# Patient Record
Sex: MALE | Race: WHITE | NOT HISPANIC OR LATINO | Employment: OTHER | URBAN - METROPOLITAN AREA
[De-identification: names, ages, dates, MRNs, and addresses within clinical notes are randomized per-mention and may not be internally consistent; named-entity substitution may affect disease eponyms.]

---

## 2019-07-22 ENCOUNTER — OFFICE VISIT (OUTPATIENT)
Dept: NEUROSURGERY | Facility: CLINIC | Age: 84
End: 2019-07-22

## 2019-07-22 ENCOUNTER — TELEPHONE (OUTPATIENT)
Dept: NEUROSURGERY | Facility: CLINIC | Age: 84
End: 2019-07-22

## 2019-07-22 VITALS — TEMPERATURE: 97.4 F | BODY MASS INDEX: 28.31 KG/M2 | HEIGHT: 72 IN | WEIGHT: 209 LBS

## 2019-07-22 DIAGNOSIS — M48.061 DEGENERATIVE LUMBAR SPINAL STENOSIS: ICD-10-CM

## 2019-07-22 DIAGNOSIS — Z98.890 HISTORY OF LUMBAR LAMINECTOMY: ICD-10-CM

## 2019-07-22 DIAGNOSIS — M79.604 RIGHT LEG PAIN: Primary | ICD-10-CM

## 2019-07-22 DIAGNOSIS — G60.9 HEREDITARY AND IDIOPATHIC PERIPHERAL NEUROPATHY: ICD-10-CM

## 2019-07-22 PROCEDURE — 99203 OFFICE O/P NEW LOW 30 MIN: CPT | Performed by: NEUROLOGICAL SURGERY

## 2019-07-22 RX ORDER — ATORVASTATIN CALCIUM 40 MG/1
TABLET, FILM COATED ORAL
COMMUNITY
Start: 2019-06-02 | End: 2022-03-25 | Stop reason: SDUPTHER

## 2019-07-22 RX ORDER — GLIMEPIRIDE 1 MG/1
TABLET ORAL
COMMUNITY
Start: 2019-07-15 | End: 2022-06-06

## 2019-07-22 RX ORDER — DICLOFENAC SODIUM AND MISOPROSTOL 50; 200 MG/1; UG/1
1 TABLET, DELAYED RELEASE ORAL 2 TIMES DAILY
COMMUNITY
End: 2019-07-22 | Stop reason: ALTCHOICE

## 2019-07-22 RX ORDER — AMLODIPINE BESYLATE 2.5 MG/1
2.5 TABLET ORAL DAILY
COMMUNITY
End: 2022-05-20

## 2019-07-22 RX ORDER — PYRAZINAMIDE 500 MG/1
1-2 TABLET ORAL EVERY 4 HOURS PRN
Qty: 60 TABLET | Refills: 0 | Status: SHIPPED | OUTPATIENT
Start: 2019-07-22 | End: 2022-05-20

## 2019-07-22 RX ORDER — OMEPRAZOLE 40 MG/1
40 CAPSULE, DELAYED RELEASE ORAL DAILY
COMMUNITY
End: 2022-06-27

## 2019-07-22 RX ORDER — CLOPIDOGREL BISULFATE 75 MG/1
TABLET ORAL
Refills: 0 | COMMUNITY
Start: 2019-05-13 | End: 2022-08-30 | Stop reason: SDUPTHER

## 2019-07-22 RX ORDER — ASPIRIN 81 MG/1
TABLET ORAL
COMMUNITY
Start: 2019-05-11 | End: 2022-05-20

## 2019-07-22 RX ORDER — METHOCARBAMOL 750 MG/1
750 TABLET, FILM COATED ORAL NIGHTLY
Qty: 30 TABLET | Refills: 0 | Status: SHIPPED | OUTPATIENT
Start: 2019-07-22 | End: 2022-05-20

## 2019-07-22 RX ORDER — TRAMADOL HYDROCHLORIDE 50 MG/1
50 TABLET ORAL EVERY 6 HOURS PRN
COMMUNITY
End: 2022-05-20

## 2019-07-22 RX ORDER — NABUMETONE 750 MG/1
750 TABLET, FILM COATED ORAL 2 TIMES DAILY
Qty: 60 TABLET | Refills: 0 | Status: SHIPPED | OUTPATIENT
Start: 2019-07-22 | End: 2019-08-20 | Stop reason: SDUPTHER

## 2019-07-22 RX ORDER — LOSARTAN POTASSIUM 50 MG/1
TABLET ORAL
COMMUNITY
Start: 2019-06-17 | End: 2022-06-06

## 2019-07-22 RX ORDER — LEVOTHYROXINE SODIUM 137 MCG
TABLET ORAL
COMMUNITY
Start: 2019-05-31 | End: 2022-09-29

## 2019-07-22 NOTE — PROGRESS NOTES
Onel ARIAS [facility]  3/25/1933  [id number]      Chief Complaint   Patient presents with   • Back Pain       HISTORY OF PRESENT ILLNESS: This is an 86-year-old male seen with a chief complaint of pain in his back rating to both lower extremities far greater on the right than the left.  This occurred after sustaining a fall approximately 1 month ago.  His past medical history is relevant in that he has a diagnosis of degenerative osteoarthritis and spinal stenosis for which a lumbar laminectomy was performed actually 15 years ago.  He is done well in the context of that until he had this fall.  He is scheduled to start physical therapy today.  Pain is quite severe clues him from walking without support.  It is in the anterior and lateral aspect of his thigh and groin.    Past Medical History:   Diagnosis Date   • Arthritis    • Diabetes mellitus (CMS/HCC)    • Hypertension    • TIA (transient ischemic attack)        Past Surgical History:   Procedure Laterality Date   • BACK SURGERY     • REPLACEMENT TOTAL KNEE Left    • TOTAL HIP ARTHROPLASTY Bilateral        Family History   Problem Relation Age of Onset   • Stroke Father        Social History     Socioeconomic History   • Marital status:      Spouse name: Not on file   • Number of children: Not on file   • Years of education: Not on file   • Highest education level: Not on file   Tobacco Use   • Smoking status: Former Smoker   • Smokeless tobacco: Never Used   Substance and Sexual Activity   • Alcohol use: Yes   • Drug use: No       No Known Allergies      Current Outpatient Medications:   •  amLODIPine (NORVASC) 2.5 MG tablet, Take 2.5 mg by mouth Daily., Disp: , Rfl:   •  aspirin 81 MG EC tablet, , Disp: , Rfl:   •  atorvastatin (LIPITOR) 40 MG tablet, , Disp: , Rfl:   •  clopidogrel (PLAVIX) 75 MG tablet, , Disp: , Rfl: 0  •  glimepiride (AMARYL) 1 MG tablet, , Disp: , Rfl:   •  losartan (COZAAR) 50 MG tablet, , Disp: , Rfl:   •  metFORMIN (GLUCOPHAGE) 1000  MG tablet, , Disp: , Rfl:   •  omeprazole (priLOSEC) 40 MG capsule, Take 40 mg by mouth Daily., Disp: , Rfl:   •  SYNTHROID 137 MCG tablet, , Disp: , Rfl:     Review of Systems   Constitutional: Positive for activity change and fatigue. Negative for appetite change, chills, diaphoresis, fever and unexpected weight change.   HENT: Positive for hearing loss and sneezing. Negative for congestion, dental problem, drooling, ear discharge, ear pain, facial swelling, mouth sores, nosebleeds, postnasal drip, rhinorrhea, sinus pressure, sinus pain, sore throat, tinnitus, trouble swallowing and voice change.    Eyes: Negative for photophobia, pain, discharge, redness, itching and visual disturbance.   Respiratory: Positive for cough. Negative for apnea, choking, chest tightness, shortness of breath, wheezing and stridor.    Cardiovascular: Negative for chest pain, palpitations and leg swelling.   Gastrointestinal: Negative for abdominal distention, abdominal pain, anal bleeding, blood in stool, constipation, diarrhea, nausea, rectal pain and vomiting.   Endocrine: Negative for cold intolerance, heat intolerance, polydipsia, polyphagia and polyuria.   Genitourinary: Negative for decreased urine volume, difficulty urinating, discharge, dysuria, enuresis, flank pain, frequency, genital sores, hematuria, penile pain, penile swelling, scrotal swelling, testicular pain and urgency.   Musculoskeletal: Positive for arthralgias and back pain. Negative for gait problem, joint swelling, myalgias, neck pain and neck stiffness.   Skin: Negative for color change, pallor, rash and wound.   Allergic/Immunologic: Positive for environmental allergies. Negative for food allergies and immunocompromised state.   Neurological: Positive for numbness. Negative for dizziness, tremors, seizures, syncope, facial asymmetry, speech difficulty, weakness, light-headedness and headaches.   Hematological: Negative for adenopathy. Does not bruise/bleed  "easily.   Psychiatric/Behavioral: Negative for agitation, behavioral problems, confusion, decreased concentration, dysphoric mood, hallucinations, self-injury, sleep disturbance and suicidal ideas. The patient is not nervous/anxious and is not hyperactive.        Vitals:    07/22/19 0901   Weight: 94.8 kg (209 lb)   Height: 182.9 cm (72\")       Neurological Examination:    Mental status/speech: The patient is alert and oriented.  Speech is clear without aphysia or dysarthria.  No overt cognitive deficits.    Cranial nerve examination:    Olfaction: Smell is intact.  Vision: Vision is intact without visual field abnormalities.  Funduscopic examination is normal.  No pupillary irregularity.  Ocular motor examination: The extraocular muscles are intact.  There is no diplopia.  The pupil is round and reactive to both light and accommodation.  There is no nystagmus.  Facial movement/sensation: There is no facial weakness.  Sensation is intact in the first, second, and third divisions of the trigeminal nerve.  The corneal reflex is intact.  Auditory: Hearing is intact to finger rub bilaterally.  Cranial nerves IX, X, XI, XII: Phonation is normal.  No dysphagia.  Tongue is protruded in the midline without atrophy.  The gag reflex is intact.  Shoulder shrug is normal.    Musculoligamentous ligamentous examination: Straight leg raising, Lasègue and flip test are negative.  There is no evidence of weakness.  He is areflexic in his upper and lower extremities.  There is no Babinski or Saleem.  He uses a walker for ambulation.    Medical Decision Making:     Diagnostic Data Set: The lumbar MRI data set show the presence of laminectomies at L2, L3 and L4.  He has significant lateral recess closure each of these levels including L1-L2 and L5-S1.      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  He will engage physical " therapy as prescribed.  He has an appointment to see Dr. Juan for an epidural steroid injection.  I have given him a prescription of Relafen 750 mg twice daily, Robaxin-750 milligrams at night and Tylenol 3 for pain.  He will call me in 2 weeks.  He has a significant anatomical abnormality that I am hopeful we can manage nonoperative.  Surgical intervention would entail facetectomies and possible PLIF at multiple levels.  At his age it may well not be in his best interest.  She will keep you informed.        I greatly appreciate the opportunity to see and evaluate this individual.  If you have questions or concerns regarding issues that I may have overlooked please call me at any time: [phone].  Stevenson Andrews M.D.  [facility]  [address] [place] [address]    Scribed for Alfonso Andrews MD by Suzy Winter CMA. 7/22/2019  9:19 AM    I have read and concur with the information provided by the scribe.  Alfonso Andrews MD

## 2019-07-22 NOTE — TELEPHONE ENCOUNTER
Provider:  Darryl  Caller: pharmacy  Time of call: 1241    Phone #: [phone]   Last visit:  today   Next visit:  tbd    Reason for call:         Pharmacy called to say that per their policy they cannot fill the Tylenol #3 as prescribed, they are requesting approval to add a maximum daily dose addendum to the Rx. The reason is that [facility] has a strict policy regarding morphine equivalency amounts for short-term narcotic pain medications. Ok to alter or have pt change pharmacies?

## 2019-07-22 NOTE — PATIENT INSTRUCTIONS
In 2 weeks after attending physical therapy and trying medications:     Call Dr. Andrwes on a Monday or Tuesday with an update.      Ask for Sharon () and leave a message for  Dr. Andrews.   He will call you back at the end of the day as soon as he can.     [phone]

## 2019-07-22 NOTE — TELEPHONE ENCOUNTER
If the patient is agreeable to what [facility] policy is then let [facility] make their changes.  He can try that first and see if that helps if not then we can go to a different pharmacy.

## 2019-08-20 ENCOUNTER — TELEPHONE (OUTPATIENT)
Dept: NEUROSURGERY | Facility: CLINIC | Age: 84
End: 2019-08-20

## 2019-08-20 RX ORDER — NABUMETONE 750 MG/1
750 TABLET, FILM COATED ORAL 2 TIMES DAILY
Qty: 60 TABLET | Refills: 1 | Status: SHIPPED | OUTPATIENT
Start: 2019-08-20 | End: 2019-10-28 | Stop reason: SDUPTHER

## 2019-08-20 NOTE — TELEPHONE ENCOUNTER
----- Message from Sharon Ricci sent at 8/20/2019  2:41 PM EDT -----  Contact: [phone]  PATIENT CALLING TO SAY HE IS DOING BETTER WITH PT AND RELAFEN.  HE NEEDS A REFILL OF HIS RELAFEN 750 B.I.D.

## 2019-10-28 DIAGNOSIS — M79.604 RIGHT LEG PAIN: Primary | ICD-10-CM

## 2019-10-28 DIAGNOSIS — M48.061 DEGENERATIVE LUMBAR SPINAL STENOSIS: ICD-10-CM

## 2019-10-28 RX ORDER — NABUMETONE 750 MG/1
750 TABLET, FILM COATED ORAL 2 TIMES DAILY
Qty: 60 TABLET | Refills: 1 | Status: SHIPPED | OUTPATIENT
Start: 2019-10-28 | End: 2019-12-20

## 2019-10-28 NOTE — TELEPHONE ENCOUNTER
Provider:  Darryl   Caller: pt   Time of call:   9:31  Phone #:  [phone]  Surgery:  na  Surgery Date:  na  Last visit:   7/22/2019  Next visit: carlos BARROSO:         Reason for call:           ----- Message from Sharon Ricci sent at 10/28/2019  9:31 AM EDT -----  Contact: [phone]  PATIENT REQUESTING A REFILL OF RELAFEN 750 B. I. D.      THANKS.

## 2019-10-29 ENCOUNTER — TELEPHONE (OUTPATIENT)
Dept: NEUROSURGERY | Facility: CLINIC | Age: 84
End: 2019-10-29

## 2019-10-29 NOTE — TELEPHONE ENCOUNTER
----- Message from Sharon Ricci sent at 10/29/2019  2:21 PM EDT -----  Contact: [phone]  PATIENT WOULD LIKE TO KNOW IF IT IS OK TO STOP PLAVIX FOR A COUPLE OF DAYS PRIOR TO HAVING SURGERY ON HIS EAR.

## 2019-10-29 NOTE — TELEPHONE ENCOUNTER
----- Message from Sharon Ricci sent at 10/29/2019  2:21 PM EDT -----  Contact: [phone]  PATIENT WOULD LIKE TO KNOW IF IT IS OK TO STOP PLAVIX FOR A COUPLE OF DAYS PRIOR TO HAVING SURGERY ON HIS EAR.

## 2019-12-20 DIAGNOSIS — M48.061 DEGENERATIVE LUMBAR SPINAL STENOSIS: ICD-10-CM

## 2019-12-20 DIAGNOSIS — M79.604 RIGHT LEG PAIN: ICD-10-CM

## 2019-12-20 RX ORDER — NABUMETONE 750 MG/1
TABLET, FILM COATED ORAL
Qty: 60 TABLET | Refills: 0 | Status: SHIPPED | OUTPATIENT
Start: 2019-12-20 | End: 2020-01-30

## 2020-01-30 DIAGNOSIS — M79.604 RIGHT LEG PAIN: ICD-10-CM

## 2020-01-30 DIAGNOSIS — M48.061 DEGENERATIVE LUMBAR SPINAL STENOSIS: ICD-10-CM

## 2020-01-30 RX ORDER — NABUMETONE 750 MG/1
TABLET, FILM COATED ORAL
Qty: 60 TABLET | Refills: 2 | Status: SHIPPED | OUTPATIENT
Start: 2020-01-30 | End: 2020-05-04 | Stop reason: SDUPTHER

## 2020-01-30 NOTE — TELEPHONE ENCOUNTER
Provider:  Darryl  Caller: Pharmacy  Time of call:   11:52a  Phone #:  [phone]  Surgery:  n/a  Surgery Date: n/a    Last visit:  19   Next visit: PRN       Reason for call:       Requested Prescriptions     Pending Prescriptions Disp Refills   • nabumetone (RELAFEN) 750 MG tablet [Pharmacy Med Name: NABUMETONE 750MG] 60 tablet 0     SiT PO BID

## 2020-05-04 DIAGNOSIS — M79.604 RIGHT LEG PAIN: ICD-10-CM

## 2020-05-04 DIAGNOSIS — M48.061 DEGENERATIVE LUMBAR SPINAL STENOSIS: ICD-10-CM

## 2020-05-04 RX ORDER — NABUMETONE 750 MG/1
750 TABLET, FILM COATED ORAL DAILY
Qty: 180 TABLET | Refills: 2 | Status: SHIPPED | OUTPATIENT
Start: 2020-05-04 | End: 2020-06-23 | Stop reason: SDUPTHER

## 2020-05-04 NOTE — TELEPHONE ENCOUNTER
Provider:  Darryl  Caller:  Automated refill request - Fax  Surgery:  NA  Surgery Date:    Last visit:  07/22/19  Next visit: NA    Reason for call:         Requested Prescriptions     Pending Prescriptions Disp Refills   • nabumetone (RELAFEN) 750 MG tablet 180 tablet 2     Sig: Take 1 tablet by mouth Daily.     *Changed QTY to 90 day

## 2022-03-26 RX ORDER — ATORVASTATIN CALCIUM 40 MG/1
40 TABLET, FILM COATED ORAL NIGHTLY
Qty: 90 TABLET | Refills: 1 | Status: SHIPPED | OUTPATIENT
Start: 2022-03-26 | End: 2022-05-20 | Stop reason: SDUPTHER

## 2022-04-13 ENCOUNTER — TELEPHONE (OUTPATIENT)
Dept: FAMILY MEDICINE CLINIC | Facility: CLINIC | Age: 87
End: 2022-04-13

## 2022-04-13 DIAGNOSIS — E11.9 TYPE 2 DIABETES MELLITUS WITHOUT COMPLICATION, WITHOUT LONG-TERM CURRENT USE OF INSULIN: Primary | ICD-10-CM

## 2022-04-13 PROBLEM — I10 PRIMARY HYPERTENSION: Status: ACTIVE | Noted: 2022-04-13

## 2022-04-16 DIAGNOSIS — I10 ESSENTIAL (PRIMARY) HYPERTENSION: ICD-10-CM

## 2022-04-18 RX ORDER — AMLODIPINE BESYLATE 5 MG/1
TABLET ORAL
Qty: 90 TABLET | Refills: 1 | Status: SHIPPED | OUTPATIENT
Start: 2022-04-18 | End: 2022-08-03 | Stop reason: SDUPTHER

## 2022-05-13 ENCOUNTER — LAB (OUTPATIENT)
Dept: FAMILY MEDICINE CLINIC | Facility: CLINIC | Age: 87
End: 2022-05-13

## 2022-05-13 DIAGNOSIS — E11.9 TYPE 2 DIABETES MELLITUS WITHOUT COMPLICATION, WITHOUT LONG-TERM CURRENT USE OF INSULIN: ICD-10-CM

## 2022-05-13 PROCEDURE — 36415 COLL VENOUS BLD VENIPUNCTURE: CPT | Performed by: FAMILY MEDICINE

## 2022-05-14 LAB
ALBUMIN SERPL-MCNC: 4.2 G/DL (ref 3.6–4.6)
ALBUMIN/GLOB SERPL: 1.7 {RATIO} (ref 1.2–2.2)
ALP SERPL-CCNC: 166 IU/L (ref 44–121)
ALT SERPL-CCNC: 15 IU/L (ref 0–44)
AST SERPL-CCNC: 25 IU/L (ref 0–40)
BASOPHILS # BLD AUTO: 0.1 X10E3/UL (ref 0–0.2)
BASOPHILS NFR BLD AUTO: 1 %
BILIRUB SERPL-MCNC: 0.7 MG/DL (ref 0–1.2)
BUN SERPL-MCNC: 16 MG/DL (ref 8–27)
BUN/CREAT SERPL: 12 (ref 10–24)
CALCIUM SERPL-MCNC: 8.8 MG/DL (ref 8.6–10.2)
CHLORIDE SERPL-SCNC: 94 MMOL/L (ref 96–106)
CHOLEST SERPL-MCNC: 142 MG/DL (ref 100–199)
CO2 SERPL-SCNC: 20 MMOL/L (ref 20–29)
CREAT SERPL-MCNC: 1.31 MG/DL (ref 0.76–1.27)
EGFRCR SERPLBLD CKD-EPI 2021: 52 ML/MIN/1.73
EOSINOPHIL # BLD AUTO: 0.1 X10E3/UL (ref 0–0.4)
EOSINOPHIL NFR BLD AUTO: 1 %
ERYTHROCYTE [DISTWIDTH] IN BLOOD BY AUTOMATED COUNT: 12.8 % (ref 11.6–15.4)
GLOBULIN SER CALC-MCNC: 2.5 G/DL (ref 1.5–4.5)
GLUCOSE SERPL-MCNC: 96 MG/DL (ref 65–99)
HBA1C MFR BLD: 6 % (ref 4.8–5.6)
HCT VFR BLD AUTO: 35.9 % (ref 37.5–51)
HDLC SERPL-MCNC: 47 MG/DL
HGB BLD-MCNC: 12.5 G/DL (ref 13–17.7)
IMM GRANULOCYTES # BLD AUTO: 0 X10E3/UL (ref 0–0.1)
IMM GRANULOCYTES NFR BLD AUTO: 0 %
LDLC SERPL CALC-MCNC: 76 MG/DL (ref 0–99)
LYMPHOCYTES # BLD AUTO: 1.9 X10E3/UL (ref 0.7–3.1)
LYMPHOCYTES NFR BLD AUTO: 22 %
MCH RBC QN AUTO: 31.6 PG (ref 26.6–33)
MCHC RBC AUTO-ENTMCNC: 34.8 G/DL (ref 31.5–35.7)
MCV RBC AUTO: 91 FL (ref 79–97)
MONOCYTES # BLD AUTO: 0.8 X10E3/UL (ref 0.1–0.9)
MONOCYTES NFR BLD AUTO: 10 %
NEUTROPHILS # BLD AUTO: 5.5 X10E3/UL (ref 1.4–7)
NEUTROPHILS NFR BLD AUTO: 66 %
PLATELET # BLD AUTO: 422 X10E3/UL (ref 150–450)
POTASSIUM SERPL-SCNC: 4.8 MMOL/L (ref 3.5–5.2)
PROT SERPL-MCNC: 6.7 G/DL (ref 6–8.5)
RBC # BLD AUTO: 3.95 X10E6/UL (ref 4.14–5.8)
SODIUM SERPL-SCNC: 134 MMOL/L (ref 134–144)
TRIGL SERPL-MCNC: 106 MG/DL (ref 0–149)
TSH SERPL DL<=0.005 MIU/L-ACNC: 1.28 UIU/ML (ref 0.45–4.5)
VLDLC SERPL CALC-MCNC: 19 MG/DL (ref 5–40)
WBC # BLD AUTO: 8.5 X10E3/UL (ref 3.4–10.8)

## 2022-05-20 ENCOUNTER — OFFICE VISIT (OUTPATIENT)
Dept: FAMILY MEDICINE CLINIC | Facility: CLINIC | Age: 87
End: 2022-05-20

## 2022-05-20 VITALS
BODY MASS INDEX: 25.06 KG/M2 | SYSTOLIC BLOOD PRESSURE: 152 MMHG | HEART RATE: 71 BPM | HEIGHT: 72 IN | DIASTOLIC BLOOD PRESSURE: 76 MMHG | OXYGEN SATURATION: 96 % | WEIGHT: 185 LBS

## 2022-05-20 DIAGNOSIS — E11.9 TYPE 2 DIABETES MELLITUS WITHOUT COMPLICATION, WITHOUT LONG-TERM CURRENT USE OF INSULIN: ICD-10-CM

## 2022-05-20 DIAGNOSIS — E78.2 MIXED HYPERLIPIDEMIA: ICD-10-CM

## 2022-05-20 DIAGNOSIS — I10 ESSENTIAL (PRIMARY) HYPERTENSION: ICD-10-CM

## 2022-05-20 DIAGNOSIS — G45.9 TIA (TRANSIENT ISCHEMIC ATTACK): Primary | ICD-10-CM

## 2022-05-20 PROCEDURE — 99214 OFFICE O/P EST MOD 30 MIN: CPT | Performed by: FAMILY MEDICINE

## 2022-05-20 RX ORDER — ATORVASTATIN CALCIUM 40 MG/1
80 TABLET, FILM COATED ORAL NIGHTLY
Qty: 90 TABLET | Refills: 1 | Status: SHIPPED | OUTPATIENT
Start: 2022-05-20 | End: 2022-05-21 | Stop reason: SDUPTHER

## 2022-05-20 RX ORDER — DONEPEZIL HYDROCHLORIDE 5 MG/1
5 TABLET, FILM COATED ORAL NIGHTLY
Qty: 30 TABLET | Refills: 2 | Status: SHIPPED | OUTPATIENT
Start: 2022-05-20 | End: 2022-08-18

## 2022-05-21 RX ORDER — ATORVASTATIN CALCIUM 80 MG/1
80 TABLET, FILM COATED ORAL NIGHTLY
Qty: 90 TABLET | Refills: 1 | Status: SHIPPED | OUTPATIENT
Start: 2022-05-21 | End: 2022-08-30 | Stop reason: SDUPTHER

## 2022-05-21 NOTE — PROGRESS NOTES
Follow Up Office Visit      Date of Visit:  2022   Patient Name: Onel Clark  : 3/25/1933   MRN: [id number]     Chief Complaint:    Chief Complaint   Patient presents with   • episode of poss TIA       History of Present Illness: Onel Clark is a 89 y.o. male who is here today for follow up.  With recent ER visit.  Episode of confusion and amnesia of the whole event of about 12 hours.  Everything was normal at the emergency room.  CT scan of his head showed chronic small vessel disease.  No acute stroke.  Questionable TIA versus dementia.        Subjective      Review of Systems:   Review of Systems   Constitutional: Positive for fatigue. Negative for unexpected weight loss.   Eyes: Negative for blurred vision.   Cardiovascular: Negative for chest pain.   Endocrine: Positive for polyuria. Negative for polydipsia and polyphagia.   Skin: Negative for pallor.   Neurological: Positive for confusion. Negative for dizziness, tremors, seizures, speech difficulty and weakness.   Psychiatric/Behavioral: The patient is nervous/anxious.        Past Medical History:   Past Medical History:   Diagnosis Date   • Acquired hypothyroidism    • Allergies    • Arthritis    • Benign prostatic hyperplasia with nocturia    • Bilateral carotid artery disease (HCC)    • Cancer of the skin, basal cell    • Chronic non-seasonal allergic rhinitis     DUE TO POLLEN   • Chronic renal impairment    • COPD (chronic obstructive pulmonary disease) (HCC)    • Decreased hearing of both ears    • Degenerative lumbar spinal stenosis    • Diabetes mellitus (HCC)    • Elevated PSA    • Frequent falls    • GERD without esophagitis    • High risk medication use    • Hx of bilateral hip replacements    • Hx of decompressive lumbar laminectomy    • Hx of total knee replacement, bilateral    • Hx of transient ischemic attack (TIA)    • Hypertension    • Mixed hyperlipidemia due to type 2 diabetes mellitus (HCC)    • Nicotine dependence     • No natural teeth     FALSE TEETH   • Osteoarthritis    • Primary hypertension    • Primary osteoarthritis involving multiple joints    • 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)    • Thyroid disease    • TIA (transient ischemic attack)    • Type 2 diabetes mellitus with diabetic polyneuropathy (HCC)    • Type 2 diabetes mellitus with stage 2 chronic kidney disease, without long-term current use of insulin (HCC)        Past Surgical History:   Past Surgical History:   Procedure Laterality Date   • BACK SURGERY  2007    Lumbar Laminectomy   • CARPAL TUNNEL RELEASE  2012   • REPLACEMENT TOTAL KNEE Left 2013   • TOTAL HIP ARTHROPLASTY Right 2016       Family History:   Family History   Problem Relation Age of Onset   • Alzheimer's disease Mother    • Stroke Father    • Coronary artery disease Father    • Diabetes Sister    • Hypertension Sister    • Hyperlipidemia Sister        Social History:   Social History     Socioeconomic History   • Marital status:    Tobacco Use   • Smoking status: Former Smoker   • Smokeless tobacco: Never Used   Substance and Sexual Activity   • Alcohol use: Yes   • Drug use: No       Medications:     Current Outpatient Medications:   •  amLODIPine (NORVASC) 5 MG tablet, TAKE 1 TABLET BY MOUTH EVERY DAY, Disp: 90 tablet, Rfl: 1  •  atorvastatin (LIPITOR) 40 MG tablet, Take 2 tablets by mouth Every Night., Disp: 90 tablet, Rfl: 1  •  clopidogrel (PLAVIX) 75 MG tablet, , Disp: , Rfl: 0  •  glimepiride (AMARYL) 1 MG tablet, , Disp: , Rfl:   •  losartan (COZAAR) 50 MG tablet, , Disp: , Rfl:   •  metFORMIN (GLUCOPHAGE) 1000 MG tablet, , Disp: , Rfl:   •  nabumetone (RELAFEN) 750 MG tablet, Take 1 tablet by mouth Daily., Disp: 180 tablet, Rfl: 2  •  omeprazole (priLOSEC) 40 MG capsule, Take 40 mg by mouth Daily., Disp: , Rfl:   •  SYNTHROID 137 MCG tablet, , Disp: , Rfl:   •  donepezil (Aricept) 5 MG tablet, Take 1 tablet by mouth Every Night., Disp: 30 tablet, Rfl:  "2    Allergies:   No Known Allergies    Objective     Physical Exam:  Vital Signs:   Vitals:    05/20/22 1031   BP: 152/76   Pulse: 71   SpO2: 96%   Weight: 83.9 kg (185 lb)   Height: 182.9 cm (72\")     Body mass index is 25.09 kg/m².     Physical Exam  Vitals and nursing note reviewed.   Constitutional:       General: He is not in acute distress.     Appearance: Normal appearance. He is not ill-appearing.   HENT:      Head: Normocephalic and atraumatic.      Right Ear: Tympanic membrane and ear canal normal.      Left Ear: Tympanic membrane and ear canal normal.      Nose: Nose normal.   Cardiovascular:      Rate and Rhythm: Normal rate and regular rhythm.      Heart sounds: Normal heart sounds.   Pulmonary:      Effort: Pulmonary effort is normal.      Breath sounds: Normal breath sounds.   Neurological:      Mental Status: He is alert and oriented to person, place, and time. Mental status is at baseline.   Psychiatric:         Mood and Affect: Mood normal.         Procedures    BMI is >= 25 and < 30. (Overweight) The following options were offered after discussion: none (medical contraindication)       Assessment / Plan      Assessment/Plan:   Diagnoses and all orders for this visit:    1. TIA (transient ischemic attack) (Primary)    2. Type 2 diabetes mellitus without complication, without long-term current use of insulin (HCC)    3. Essential (primary) hypertension    4. Mixed hyperlipidemia    Other orders  -     donepezil (Aricept) 5 MG tablet; Take 1 tablet by mouth Every Night.  Dispense: 30 tablet; Refill: 2  -     atorvastatin (LIPITOR) 40 MG tablet; Take 2 tablets by mouth Every Night.  Dispense: 90 tablet; Refill: 1         Maximize doses of current medication.  Increase Lipitor to 80 mg.  Take baby aspirin along with his current Plavix.  With his elderly age and frail nature, patient really declines any further major medical work-up.    Follow Up:   Return in about 3 months (around 8/20/2022) for " Sacha.    Ilir [facility]   Answers for HPI/ROS submitted by the patient on 5/14/2022  What is the primary reason for your visit?: Diabetes  Diabetes type: type 2  MedicAlert ID: No  Disease duration: 3 years  foot paresthesias: Yes  foot ulcerations: No  visual change: No  Symptom course: worsening  headaches: No  hunger: No  mood changes: Yes  sleepiness: Yes  sweats: No  blackouts: No  hospitalization: No  nocturnal hypoglycemia: No  required assistance: Yes  required glucagon: No  CVA: No  heart disease: No  impotence: No  nephropathy: No  peripheral neuropathy: Yes  PVD: Yes  retinopathy: No  CAD risks: dyslipidemia, hypertension  Current treatments: diet  Treatment compliance: most of the time  Monitoring compliance: fair  Weight trend: stable  Current diet: generally healthy  Meal planning: carbohydrate counting  Exercise: daily  Dietitian visit: No  Eye exam current: Yes  Sees podiatrist: Yes

## 2022-06-06 RX ORDER — LOSARTAN POTASSIUM 50 MG/1
TABLET ORAL
Qty: 90 TABLET | Refills: 1 | Status: SHIPPED | OUTPATIENT
Start: 2022-06-06 | End: 2022-06-22 | Stop reason: SDUPTHER

## 2022-06-06 RX ORDER — GLIMEPIRIDE 1 MG/1
TABLET ORAL
Qty: 90 TABLET | Refills: 1 | Status: SHIPPED | OUTPATIENT
Start: 2022-06-06 | End: 2022-06-22 | Stop reason: SDUPTHER

## 2022-06-08 DIAGNOSIS — M48.061 DEGENERATIVE LUMBAR SPINAL STENOSIS: ICD-10-CM

## 2022-06-08 DIAGNOSIS — M79.604 RIGHT LEG PAIN: ICD-10-CM

## 2022-06-08 RX ORDER — NABUMETONE 750 MG/1
750 TABLET, FILM COATED ORAL DAILY
Qty: 180 TABLET | Refills: 1 | Status: SHIPPED | OUTPATIENT
Start: 2022-06-08 | End: 2022-06-22 | Stop reason: SDUPTHER

## 2022-06-22 ENCOUNTER — PATIENT MESSAGE (OUTPATIENT)
Dept: FAMILY MEDICINE CLINIC | Facility: CLINIC | Age: 87
End: 2022-06-22

## 2022-06-22 DIAGNOSIS — M79.604 RIGHT LEG PAIN: ICD-10-CM

## 2022-06-22 DIAGNOSIS — M48.061 DEGENERATIVE LUMBAR SPINAL STENOSIS: ICD-10-CM

## 2022-06-22 RX ORDER — GLIMEPIRIDE 1 MG/1
1 TABLET ORAL DAILY
Qty: 90 TABLET | Refills: 1 | Status: SHIPPED | OUTPATIENT
Start: 2022-06-22 | End: 2022-09-29

## 2022-06-22 RX ORDER — POTASSIUM CHLORIDE 20 MEQ/1
20 TABLET, EXTENDED RELEASE ORAL 2 TIMES DAILY
COMMUNITY
End: 2022-06-22 | Stop reason: SDUPTHER

## 2022-06-22 RX ORDER — LOSARTAN POTASSIUM 50 MG/1
50 TABLET ORAL DAILY
Qty: 90 TABLET | Refills: 1 | Status: SHIPPED | OUTPATIENT
Start: 2022-06-22 | End: 2023-02-14

## 2022-06-22 RX ORDER — FUROSEMIDE 40 MG/1
40 TABLET ORAL DAILY
Qty: 30 TABLET | Refills: 2 | Status: SHIPPED | OUTPATIENT
Start: 2022-06-22

## 2022-06-22 RX ORDER — FUROSEMIDE 40 MG/1
TABLET ORAL
COMMUNITY
Start: 2022-06-05 | End: 2022-06-22 | Stop reason: SDUPTHER

## 2022-06-22 RX ORDER — NABUMETONE 750 MG/1
750 TABLET, FILM COATED ORAL DAILY
Qty: 180 TABLET | Refills: 1 | Status: SHIPPED | OUTPATIENT
Start: 2022-06-22 | End: 2023-02-14

## 2022-06-22 RX ORDER — POTASSIUM CHLORIDE 20 MEQ/1
20 TABLET, EXTENDED RELEASE ORAL 2 TIMES DAILY
Qty: 60 TABLET | Refills: 2 | Status: SHIPPED | OUTPATIENT
Start: 2022-06-22 | End: 2022-06-24 | Stop reason: SDUPTHER

## 2022-06-22 NOTE — TELEPHONE ENCOUNTER
From: Onel Clark  To: Ilir Fair MD  Sent: 6/22/2022 2:17 PM EDT  Subject: Prescriptions needed and sent to [facility] & MAILED to us.    Please fill the Nabutone, Glimepiride and Losartan prescriptions for Onel and have [facility] mail them to us. They know they should, but sometimes forget.    Also, he needs the potassium pills that he takes with the water pill and we can pick those up at [facility] because we need them now. Thank you again for your help. NB/SFC

## 2022-06-24 RX ORDER — POTASSIUM CHLORIDE 20 MEQ/1
20 TABLET, EXTENDED RELEASE ORAL 2 TIMES DAILY
Qty: 60 TABLET | Refills: 2 | Status: SHIPPED | OUTPATIENT
Start: 2022-06-24 | End: 2022-07-21 | Stop reason: SDUPTHER

## 2022-06-27 RX ORDER — OMEPRAZOLE 40 MG/1
CAPSULE, DELAYED RELEASE ORAL
Qty: 90 CAPSULE | Refills: 1 | Status: SHIPPED | OUTPATIENT
Start: 2022-06-27 | End: 2022-10-10 | Stop reason: SDUPTHER

## 2022-06-28 ENCOUNTER — PATIENT MESSAGE (OUTPATIENT)
Dept: FAMILY MEDICINE CLINIC | Facility: CLINIC | Age: 87
End: 2022-06-28

## 2022-06-28 RX ORDER — MEMANTINE HYDROCHLORIDE 5 MG/1
5 TABLET ORAL 2 TIMES DAILY
Qty: 60 TABLET | Refills: 2 | Status: SHIPPED | OUTPATIENT
Start: 2022-06-28 | End: 2022-09-29

## 2022-06-29 NOTE — TELEPHONE ENCOUNTER
From: Onel Clark  To: Ilir Fair MD  Sent: 6/28/2022 12:09 PM EDT  Subject: Aricepts' Donepezil 5mg.    He has been having diarreaha with this pill and thought with the Atorvastatin 80mg. Tried to make sure which one, and it seems to be the Donepezil. Is there any other med he could try that would help him without the diarreaha? If so, please send it to [facility] and tell them to mail it to us. Thanks again and hope you had a great vacation. We certainly miss the travelling !! NB/SFC

## 2022-07-08 ENCOUNTER — TELEPHONE (OUTPATIENT)
Dept: FAMILY MEDICINE CLINIC | Facility: CLINIC | Age: 87
End: 2022-07-08

## 2022-07-21 RX ORDER — POTASSIUM CHLORIDE 20 MEQ/1
20 TABLET, EXTENDED RELEASE ORAL 2 TIMES DAILY
Qty: 180 TABLET | Refills: 0 | Status: SHIPPED | OUTPATIENT
Start: 2022-07-21

## 2022-08-03 DIAGNOSIS — I10 ESSENTIAL (PRIMARY) HYPERTENSION: ICD-10-CM

## 2022-08-03 RX ORDER — AMLODIPINE BESYLATE 5 MG/1
5 TABLET ORAL DAILY
Qty: 90 TABLET | Refills: 1 | Status: SHIPPED | OUTPATIENT
Start: 2022-08-03 | End: 2022-08-30 | Stop reason: SDUPTHER

## 2022-08-18 RX ORDER — DONEPEZIL HYDROCHLORIDE 5 MG/1
TABLET, FILM COATED ORAL
Qty: 90 TABLET | Refills: 0 | Status: SHIPPED | OUTPATIENT
Start: 2022-08-18 | End: 2022-09-29

## 2022-08-30 DIAGNOSIS — I10 ESSENTIAL (PRIMARY) HYPERTENSION: ICD-10-CM

## 2022-08-30 RX ORDER — AMLODIPINE BESYLATE 5 MG/1
5 TABLET ORAL DAILY
Qty: 90 TABLET | Refills: 1 | Status: SHIPPED | OUTPATIENT
Start: 2022-08-30 | End: 2023-02-14

## 2022-08-30 RX ORDER — CLOPIDOGREL BISULFATE 75 MG/1
75 TABLET ORAL DAILY
Qty: 90 TABLET | Refills: 1 | Status: SHIPPED | OUTPATIENT
Start: 2022-08-30

## 2022-08-30 RX ORDER — ATORVASTATIN CALCIUM 80 MG/1
80 TABLET, FILM COATED ORAL NIGHTLY
Qty: 90 TABLET | Refills: 1 | Status: SHIPPED | OUTPATIENT
Start: 2022-08-30 | End: 2023-03-13

## 2022-08-31 ENCOUNTER — PATIENT MESSAGE (OUTPATIENT)
Dept: FAMILY MEDICINE CLINIC | Facility: CLINIC | Age: 87
End: 2022-08-31

## 2022-09-28 RX ORDER — LEVOTHYROXINE SODIUM 137 UG/1
137 TABLET ORAL DAILY
Qty: 90 TABLET | Refills: 1 | Status: SHIPPED | OUTPATIENT
Start: 2022-09-28 | End: 2023-03-13

## 2022-09-28 RX ORDER — LEVOTHYROXINE SODIUM 137 UG/1
1 TABLET ORAL DAILY
COMMUNITY
Start: 2022-03-18 | End: 2022-09-28 | Stop reason: SDUPTHER

## 2022-09-29 ENCOUNTER — OFFICE VISIT (OUTPATIENT)
Dept: FAMILY MEDICINE CLINIC | Facility: CLINIC | Age: 87
End: 2022-09-29

## 2022-09-29 VITALS
WEIGHT: 180 LBS | DIASTOLIC BLOOD PRESSURE: 64 MMHG | HEIGHT: 72 IN | OXYGEN SATURATION: 100 % | SYSTOLIC BLOOD PRESSURE: 140 MMHG | BODY MASS INDEX: 24.38 KG/M2 | HEART RATE: 76 BPM

## 2022-09-29 DIAGNOSIS — R41.3 MEMORY LOSS: ICD-10-CM

## 2022-09-29 DIAGNOSIS — E78.2 MIXED HYPERLIPIDEMIA: ICD-10-CM

## 2022-09-29 DIAGNOSIS — R19.7 DIARRHEA, UNSPECIFIED TYPE: ICD-10-CM

## 2022-09-29 DIAGNOSIS — G45.9 TIA (TRANSIENT ISCHEMIC ATTACK): Primary | ICD-10-CM

## 2022-09-29 PROCEDURE — 99214 OFFICE O/P EST MOD 30 MIN: CPT | Performed by: FAMILY MEDICINE

## 2022-09-30 NOTE — PROGRESS NOTES
Follow Up Office Visit      Date of Visit:  2022   Patient Name: Onel Clark  : 3/25/1933   MRN: [id number]     Chief Complaint:  No chief complaint on file.      History of Present Illness: Onel Clark is a 89 y.o. male who is here today for follow up.  Patient here for discussion on multiple issues.  There was some questions on his lipid medication.  They did not remember why we had increased his atorvastatin from 40 to 80 mg.  We discussed the prior TIA while he was on 40 mg.  This is what prompted the change to 80 mg.  They do understand now.  Patient also more recently with some diarrhea.  We have more recently been trying to work with medication to help a little bit with memory loss.  He has been on donepezil initially and now memantine.  It is possible that his diarrhea could be due to these medications or changes in these medications.        Subjective      Review of Systems:   Review of Systems   Constitutional: Negative for fatigue and fever.   HENT: Negative for congestion and ear pain.    Respiratory: Negative for apnea, cough, chest tightness and shortness of breath.    Cardiovascular: Negative for chest pain.   Gastrointestinal: Negative for abdominal pain, constipation, diarrhea and nausea.   Musculoskeletal: Positive for arthralgias.   Neurological: Positive for tremors and weakness.   Psychiatric/Behavioral: Negative for depressed mood and stress.       Past Medical History:   Past Medical History:   Diagnosis Date   • Acquired hypothyroidism    • Allergies    • Arthritis    • Benign prostatic hyperplasia with nocturia    • Bilateral carotid artery disease (HCC)    • Cancer of the skin, basal cell    • Chronic non-seasonal allergic rhinitis     DUE TO POLLEN   • Chronic renal impairment    • COPD (chronic obstructive pulmonary disease) ([facility])    • Decreased hearing of both ears    • Degenerative lumbar spinal stenosis    • Diabetes mellitus ([facility])    • Elevated PSA    •  Frequent falls    • GERD without esophagitis    • High risk medication use    • Hx of bilateral hip replacements    • Hx of decompressive lumbar laminectomy    • Hx of total knee replacement, bilateral    • Hx of transient ischemic attack (TIA)    • Hypertension    • Mixed hyperlipidemia due to type 2 diabetes mellitus (HCC)    • Nicotine dependence    • No natural teeth     FALSE TEETH   • Osteoarthritis    • Primary hypertension    • Primary osteoarthritis involving multiple joints    • 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)    • Thyroid disease    • TIA (transient ischemic attack)    • Type 2 diabetes mellitus with diabetic polyneuropathy (HCC)    • Type 2 diabetes mellitus with stage 2 chronic kidney disease, without long-term current use of insulin (HCC)        Past Surgical History:   Past Surgical History:   Procedure Laterality Date   • BACK SURGERY  2007    Lumbar Laminectomy   • CARPAL TUNNEL RELEASE  2012   • REPLACEMENT TOTAL KNEE Left 2013   • TOTAL HIP ARTHROPLASTY Right 2016       Family History:   Family History   Problem Relation Age of Onset   • Alzheimer's disease Mother    • Stroke Father    • Coronary artery disease Father    • Diabetes Sister    • Hypertension Sister    • Hyperlipidemia Sister        Social History:   Social History     Socioeconomic History   • Marital status:    Tobacco Use   • Smoking status: Former Smoker   • Smokeless tobacco: Never Used   Substance and Sexual Activity   • Alcohol use: Yes   • Drug use: No       Medications:     Current Outpatient Medications:   •  amLODIPine (NORVASC) 5 MG tablet, Take 1 tablet by mouth Daily., Disp: 90 tablet, Rfl: 1  •  atorvastatin (LIPITOR) 80 MG tablet, Take 1 tablet by mouth Every Night., Disp: 90 tablet, Rfl: 1  •  clopidogrel (PLAVIX) 75 MG tablet, Take 1 tablet by mouth Daily., Disp: 90 tablet, Rfl: 1  •  furosemide (LASIX) 40 MG tablet, Take 1 tablet by mouth Daily., Disp: 30 tablet, Rfl: 2  •  levothyroxine  "(SYNTHROID, LEVOTHROID) 137 MCG tablet, Take 1 tablet by mouth Daily., Disp: 90 tablet, Rfl: 1  •  losartan (COZAAR) 50 MG tablet, Take 1 tablet by mouth Daily., Disp: 90 tablet, Rfl: 1  •  metFORMIN (GLUCOPHAGE) 1000 MG tablet, , Disp: , Rfl:   •  nabumetone (RELAFEN) 750 MG tablet, Take 1 tablet by mouth Daily., Disp: 180 tablet, Rfl: 1  •  omeprazole (priLOSEC) 40 MG capsule, TAKE 1 CAPSULE ONCE DAILY BEFORE A MEAL, Disp: 90 capsule, Rfl: 1  •  potassium chloride (K-DUR,KLOR-CON) 20 MEQ CR tablet, Take 1 tablet by mouth 2 (Two) Times a Day., Disp: 180 tablet, Rfl: 0    Allergies:   No Known Allergies    Objective     Physical Exam:  Vital Signs:   Vitals:    09/29/22 1548   BP: 140/64   Pulse: 76   SpO2: 100%   Weight: 81.6 kg (180 lb)   Height: 182.9 cm (72\")     Body mass index is 24.41 kg/m².     Physical Exam  Vitals and nursing note reviewed.   Constitutional:       General: He is not in acute distress.     Appearance: Normal appearance. He is not ill-appearing.   HENT:      Head: Normocephalic and atraumatic.      Right Ear: Tympanic membrane and ear canal normal.      Left Ear: Tympanic membrane and ear canal normal.      Nose: Nose normal.   Cardiovascular:      Rate and Rhythm: Normal rate and regular rhythm.      Heart sounds: Normal heart sounds.   Pulmonary:      Effort: Pulmonary effort is normal.      Breath sounds: Normal breath sounds.   Neurological:      Mental Status: He is alert and oriented to person, place, and time. Mental status is at baseline.   Psychiatric:         Mood and Affect: Mood normal.         Procedures      Assessment / Plan      Assessment/Plan:   Diagnoses and all orders for this visit:    1. TIA (transient ischemic attack) (Primary)    2. Mixed hyperlipidemia    3. Diarrhea, unspecified type    4. Memory loss         He is going to continue the higher dose of atorvastatin because of the prior TIA.  Continue his other medications for hypertension and stroke risk.  We are " going to stop the memantine for now.  He is already off of the donepezil.  We will see if this helps with his diarrhea first before doing anything else.  We will then discuss also his memory loss depending on what happens with the diarrhea.    Follow Up:   No follow-ups on file.    Ilir Fair  [facility] [place]

## 2022-10-10 ENCOUNTER — TELEPHONE (OUTPATIENT)
Dept: FAMILY MEDICINE CLINIC | Facility: CLINIC | Age: 87
End: 2022-10-10

## 2022-10-10 RX ORDER — OMEPRAZOLE 40 MG/1
40 CAPSULE, DELAYED RELEASE ORAL DAILY
Qty: 90 CAPSULE | Refills: 1 | Status: SHIPPED | OUTPATIENT
Start: 2022-10-10

## 2022-11-09 RX ORDER — MEMANTINE HYDROCHLORIDE 5 MG/1
TABLET ORAL
Qty: 60 TABLET | Refills: 0 | Status: SHIPPED | OUTPATIENT
Start: 2022-11-09 | End: 2022-12-09 | Stop reason: SDUPTHER

## 2022-12-09 NOTE — TELEPHONE ENCOUNTER
Incoming Refill Request      Medication requested (name and dose): memantine (NAMENDA) 5 MG tablet    Pharmacy where request should be sent: [facility], [place]    Additional details provided by patient: PATIENT HAS TWO DAYS LEFT    Best call back number: [phone]    Does the patient have less than a 3 day supply:  [x] Yes  [] No    Emiliano Cuevas Rep  12/09/22, 08:23 EST

## 2022-12-12 RX ORDER — MEMANTINE HYDROCHLORIDE 5 MG/1
TABLET ORAL
Qty: 60 TABLET | Refills: 0 | OUTPATIENT
Start: 2022-12-12

## 2022-12-12 RX ORDER — MEMANTINE HYDROCHLORIDE 5 MG/1
5 TABLET ORAL 2 TIMES DAILY
Qty: 60 TABLET | Refills: 2 | Status: SHIPPED | OUTPATIENT
Start: 2022-12-12 | End: 2023-02-22

## 2023-01-16 ENCOUNTER — TELEPHONE (OUTPATIENT)
Dept: FAMILY MEDICINE CLINIC | Facility: CLINIC | Age: 88
End: 2023-01-16
Payer: MEDICARE

## 2023-01-16 DIAGNOSIS — E78.2 MIXED HYPERLIPIDEMIA: ICD-10-CM

## 2023-01-16 DIAGNOSIS — E11.9 TYPE 2 DIABETES MELLITUS WITHOUT COMPLICATION, WITHOUT LONG-TERM CURRENT USE OF INSULIN: ICD-10-CM

## 2023-01-16 DIAGNOSIS — I10 ESSENTIAL (PRIMARY) HYPERTENSION: Primary | ICD-10-CM

## 2023-01-16 RX ORDER — MEMANTINE HYDROCHLORIDE 5 MG/1
5 TABLET ORAL 2 TIMES DAILY
Qty: 60 TABLET | Refills: 2 | OUTPATIENT
Start: 2023-01-16

## 2023-01-16 NOTE — TELEPHONE ENCOUNTER
Caller: J LUIS DOMINGO    Relationship: Emergency Contact    Best call back number: [phone]    What orders are you requesting (i.e. lab or imaging): ACTIVE LAB ORDERS    In what timeframe would the patient need to come in: NA    Where will you receive your lab/imaging services: NA    Additional notes: PATIENTS WIFE CALLED TO SCHEDULE APPOINTMENT TO HAVE LABS DONE, BUT WOULD ALSO LIKE TO GO OVER THE LAB RESULTS AT APPOINTMENT.     PATIENT AS LAST SEEN 9/29/22      PLEASE ADVISE

## 2023-01-26 ENCOUNTER — LAB (OUTPATIENT)
Dept: FAMILY MEDICINE CLINIC | Facility: CLINIC | Age: 88
End: 2023-01-26
Payer: MEDICARE

## 2023-01-26 DIAGNOSIS — I10 ESSENTIAL (PRIMARY) HYPERTENSION: ICD-10-CM

## 2023-01-26 DIAGNOSIS — E11.9 TYPE 2 DIABETES MELLITUS WITHOUT COMPLICATION, WITHOUT LONG-TERM CURRENT USE OF INSULIN: ICD-10-CM

## 2023-01-26 DIAGNOSIS — E78.2 MIXED HYPERLIPIDEMIA: ICD-10-CM

## 2023-01-26 PROCEDURE — 36415 COLL VENOUS BLD VENIPUNCTURE: CPT | Performed by: FAMILY MEDICINE

## 2023-01-27 LAB
ALBUMIN SERPL-MCNC: 4 G/DL (ref 3.6–4.6)
ALBUMIN/GLOB SERPL: 1.5 {RATIO} (ref 1.2–2.2)
ALP SERPL-CCNC: 203 IU/L (ref 44–121)
ALT SERPL-CCNC: 13 IU/L (ref 0–44)
AST SERPL-CCNC: 25 IU/L (ref 0–40)
BASOPHILS # BLD AUTO: 0.1 X10E3/UL (ref 0–0.2)
BASOPHILS NFR BLD AUTO: 1 %
BILIRUB SERPL-MCNC: 0.5 MG/DL (ref 0–1.2)
BUN SERPL-MCNC: 17 MG/DL (ref 8–27)
BUN/CREAT SERPL: 13 (ref 10–24)
CALCIUM SERPL-MCNC: 9.6 MG/DL (ref 8.6–10.2)
CHLORIDE SERPL-SCNC: 97 MMOL/L (ref 96–106)
CHOLEST SERPL-MCNC: 150 MG/DL (ref 100–199)
CO2 SERPL-SCNC: 24 MMOL/L (ref 20–29)
CREAT SERPL-MCNC: 1.33 MG/DL (ref 0.76–1.27)
EGFRCR SERPLBLD CKD-EPI 2021: 51 ML/MIN/1.73
EOSINOPHIL # BLD AUTO: 0.2 X10E3/UL (ref 0–0.4)
EOSINOPHIL NFR BLD AUTO: 2 %
ERYTHROCYTE [DISTWIDTH] IN BLOOD BY AUTOMATED COUNT: 13.2 % (ref 11.6–15.4)
GLOBULIN SER CALC-MCNC: 2.7 G/DL (ref 1.5–4.5)
GLUCOSE SERPL-MCNC: 170 MG/DL (ref 70–99)
HBA1C MFR BLD: 6.8 % (ref 4.8–5.6)
HCT VFR BLD AUTO: 35.6 % (ref 37.5–51)
HDLC SERPL-MCNC: 65 MG/DL
HGB BLD-MCNC: 12.1 G/DL (ref 13–17.7)
IMM GRANULOCYTES # BLD AUTO: 0 X10E3/UL (ref 0–0.1)
IMM GRANULOCYTES NFR BLD AUTO: 0 %
LDLC SERPL CALC-MCNC: 68 MG/DL (ref 0–99)
LYMPHOCYTES # BLD AUTO: 2.3 X10E3/UL (ref 0.7–3.1)
LYMPHOCYTES NFR BLD AUTO: 23 %
MCH RBC QN AUTO: 31.7 PG (ref 26.6–33)
MCHC RBC AUTO-ENTMCNC: 34 G/DL (ref 31.5–35.7)
MCV RBC AUTO: 93 FL (ref 79–97)
MONOCYTES # BLD AUTO: 1 X10E3/UL (ref 0.1–0.9)
MONOCYTES NFR BLD AUTO: 10 %
NEUTROPHILS # BLD AUTO: 6.4 X10E3/UL (ref 1.4–7)
NEUTROPHILS NFR BLD AUTO: 64 %
PLATELET # BLD AUTO: 322 X10E3/UL (ref 150–450)
POTASSIUM SERPL-SCNC: 5 MMOL/L (ref 3.5–5.2)
PROT SERPL-MCNC: 6.7 G/DL (ref 6–8.5)
RBC # BLD AUTO: 3.82 X10E6/UL (ref 4.14–5.8)
SODIUM SERPL-SCNC: 137 MMOL/L (ref 134–144)
TRIGL SERPL-MCNC: 89 MG/DL (ref 0–149)
TSH SERPL DL<=0.005 MIU/L-ACNC: 2.09 UIU/ML (ref 0.45–4.5)
VLDLC SERPL CALC-MCNC: 17 MG/DL (ref 5–40)
WBC # BLD AUTO: 10 X10E3/UL (ref 3.4–10.8)

## 2023-02-14 DIAGNOSIS — M48.061 DEGENERATIVE LUMBAR SPINAL STENOSIS: ICD-10-CM

## 2023-02-14 DIAGNOSIS — M79.604 RIGHT LEG PAIN: ICD-10-CM

## 2023-02-14 DIAGNOSIS — I10 ESSENTIAL (PRIMARY) HYPERTENSION: ICD-10-CM

## 2023-02-14 RX ORDER — AMLODIPINE BESYLATE 5 MG/1
TABLET ORAL
Qty: 90 TABLET | Refills: 1 | Status: SHIPPED | OUTPATIENT
Start: 2023-02-14

## 2023-02-14 RX ORDER — NABUMETONE 750 MG/1
TABLET, FILM COATED ORAL
Qty: 180 TABLET | Refills: 0 | Status: SHIPPED | OUTPATIENT
Start: 2023-02-14

## 2023-02-14 RX ORDER — LOSARTAN POTASSIUM 50 MG/1
TABLET ORAL
Qty: 90 TABLET | Refills: 0 | Status: SHIPPED | OUTPATIENT
Start: 2023-02-14

## 2023-02-22 ENCOUNTER — OFFICE VISIT (OUTPATIENT)
Dept: FAMILY MEDICINE CLINIC | Facility: CLINIC | Age: 88
End: 2023-02-22
Payer: MEDICARE

## 2023-02-22 VITALS
HEIGHT: 72 IN | HEART RATE: 89 BPM | OXYGEN SATURATION: 96 % | BODY MASS INDEX: 25.19 KG/M2 | WEIGHT: 186 LBS | DIASTOLIC BLOOD PRESSURE: 70 MMHG | SYSTOLIC BLOOD PRESSURE: 130 MMHG

## 2023-02-22 DIAGNOSIS — I10 ESSENTIAL (PRIMARY) HYPERTENSION: Primary | ICD-10-CM

## 2023-02-22 DIAGNOSIS — Z00.00 ROUTINE GENERAL MEDICAL EXAMINATION AT A HEALTH CARE FACILITY: ICD-10-CM

## 2023-02-22 DIAGNOSIS — E11.9 TYPE 2 DIABETES MELLITUS WITHOUT COMPLICATION, WITHOUT LONG-TERM CURRENT USE OF INSULIN: ICD-10-CM

## 2023-02-22 DIAGNOSIS — M48.061 DEGENERATIVE LUMBAR SPINAL STENOSIS: ICD-10-CM

## 2023-02-22 DIAGNOSIS — E78.2 MIXED HYPERLIPIDEMIA: ICD-10-CM

## 2023-02-22 DIAGNOSIS — R41.3 MEMORY LOSS: ICD-10-CM

## 2023-02-22 PROCEDURE — 99213 OFFICE O/P EST LOW 20 MIN: CPT | Performed by: FAMILY MEDICINE

## 2023-02-22 NOTE — PROGRESS NOTES
Follow Up Office Visit      Date of Visit:  2023   Patient Name: Onel Clark  : 3/25/1933   MRN: [id number]     Chief Complaint:    Chief Complaint   Patient presents with   • go over labs       History of Present Illness: Onel Clark is a 89 y.o. male who is here today for follow up.  Patient following up on his recent blood work for his chronic medical conditions.  Blood work overall does remain stable.  He continues to have a great deal of difficulty with getting around because of his chronic osteoarthritis.  Walking with a walker.  He has hypertension hyperlipidemia and diabetes are currently controlled.  He does request a referral to dermatology for further checkups.  His dermatologist has retired.  Reviewed his current medications for memory and discussed with him and his wife.  Things are overall stable.        Subjective      Review of Systems:   Review of Systems   Constitutional: Positive for unexpected weight loss. Negative for chills and fever.   HENT: Positive for postnasal drip and rhinorrhea. Negative for ear pain and sore throat.    Respiratory: Positive for cough and wheezing. Negative for shortness of breath.    Cardiovascular: Negative for chest pain.   Musculoskeletal: Negative for myalgias.   Skin: Negative for rash.       Past Medical History:   Past Medical History:   Diagnosis Date   • Acquired hypothyroidism    • Allergies    • Arthritis    • Benign prostatic hyperplasia with nocturia    • Bilateral carotid artery disease (HCC)    • Cancer of the skin, basal cell    • Chronic non-seasonal allergic rhinitis     DUE TO POLLEN   • Chronic renal impairment    • COPD (chronic obstructive pulmonary disease) (HCC)    • Decreased hearing of both ears    • Degenerative lumbar spinal stenosis    • Diabetes mellitus (HCC)    • Elevated PSA    • Frequent falls    • GERD without esophagitis    • High risk medication use    • Hx of bilateral hip replacements    • Hx of  decompressive lumbar laminectomy    • Hx of total knee replacement, bilateral    • Hx of transient ischemic attack (TIA)    • Hypertension    • Mixed hyperlipidemia due to type 2 diabetes mellitus (HCC)    • Nicotine dependence    • No natural teeth     FALSE TEETH   • Osteoarthritis    • Primary hypertension    • Primary osteoarthritis involving multiple joints    • 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)    • Thyroid disease    • TIA (transient ischemic attack)    • Type 2 diabetes mellitus with diabetic polyneuropathy (HCC)    • Type 2 diabetes mellitus with stage 2 chronic kidney disease, without long-term current use of insulin (HCC)        Past Surgical History:   Past Surgical History:   Procedure Laterality Date   • BACK SURGERY  2007    Lumbar Laminectomy   • CARPAL TUNNEL RELEASE  2012   • REPLACEMENT TOTAL KNEE Left 2013   • TOTAL HIP ARTHROPLASTY Right 2016       Family History:   Family History   Problem Relation Age of Onset   • Alzheimer's disease Mother    • Stroke Father    • Coronary artery disease Father    • Diabetes Sister    • Hypertension Sister    • Hyperlipidemia Sister        Social History:   Social History     Socioeconomic History   • Marital status:    Tobacco Use   • Smoking status: Former   • Smokeless tobacco: Never   Substance and Sexual Activity   • Alcohol use: Yes   • Drug use: No       Medications:     Current Outpatient Medications:   •  amLODIPine (NORVASC) 5 MG tablet, TAKE 1 TABLET BY MOUTH EVERY DAY, Disp: 90 tablet, Rfl: 1  •  atorvastatin (LIPITOR) 80 MG tablet, Take 1 tablet by mouth Every Night., Disp: 90 tablet, Rfl: 1  •  clopidogrel (PLAVIX) 75 MG tablet, Take 1 tablet by mouth Daily., Disp: 90 tablet, Rfl: 1  •  furosemide (LASIX) 40 MG tablet, Take 1 tablet by mouth Daily., Disp: 30 tablet, Rfl: 2  •  levothyroxine (SYNTHROID, LEVOTHROID) 137 MCG tablet, Take 1 tablet by mouth Daily., Disp: 90 tablet, Rfl: 1  •  losartan (COZAAR) 50 MG tablet, TAKE  "1 TABLET BY MOUTH EVERY DAY, Disp: 90 tablet, Rfl: 0  •  memantine (NAMENDA) 5 MG tablet, Take 1 tablet by mouth 2 (Two) Times a Day., Disp: , Rfl:   •  metFORMIN (GLUCOPHAGE) 1000 MG tablet, , Disp: , Rfl:   •  nabumetone (RELAFEN) 750 MG tablet, TAKE 1 TABLET BY MOUTH EVERY DAY, Disp: 180 tablet, Rfl: 0  •  omeprazole (priLOSEC) 40 MG capsule, Take 1 capsule by mouth Daily., Disp: 90 capsule, Rfl: 1  •  potassium chloride (K-DUR,KLOR-CON) 20 MEQ CR tablet, Take 1 tablet by mouth 2 (Two) Times a Day., Disp: 180 tablet, Rfl: 0    Allergies:   No Known Allergies    Objective     Physical Exam:  Vital Signs:   Vitals:    02/22/23 1016   BP: 130/70   Pulse: 89   SpO2: 96%   Weight: 84.4 kg (186 lb)   Height: 182.9 cm (72\")     Body mass index is 25.23 kg/m².     Physical Exam  Vitals and nursing note reviewed.   Constitutional:       General: He is not in acute distress.     Appearance: Normal appearance. He is not ill-appearing.   HENT:      Head: Normocephalic and atraumatic.      Right Ear: Tympanic membrane and ear canal normal.      Left Ear: Tympanic membrane and ear canal normal.      Nose: Nose normal.   Cardiovascular:      Rate and Rhythm: Normal rate and regular rhythm.      Heart sounds: Normal heart sounds.   Pulmonary:      Effort: Pulmonary effort is normal.      Breath sounds: Normal breath sounds.   Neurological:      Mental Status: He is alert and oriented to person, place, and time. Mental status is at baseline.   Psychiatric:         Mood and Affect: Mood normal.         Procedures      Assessment / Plan      Assessment/Plan:   Diagnoses and all orders for this visit:    1. Essential (primary) hypertension (Primary)    2. Routine general medical examination at a health care facility  -     Ambulatory Referral to Dermatology    3. Degenerative lumbar spinal stenosis    4. Type 2 diabetes mellitus without complication, without long-term current use of insulin (HCC)    5. Mixed hyperlipidemia    6. " Memory loss         Current blood pressure readings are stable.  Patient does request referral to dermatology for an overall skin checkup.  He is current dermatologist has retired.  We reviewed his current blood work for his diabetes and hyperlipidemia.  Conditions are overall stable and he will need to continue his current medications.  Memory loss is overall stable.  Does currently take Namenda.  He will need refills at some point soon and we will do this when needed.  He continues to walk with a walker.  Very unsteady on his feet.    Follow Up:   No follow-ups on file.    Ilir Fair  [facility] [place]

## 2023-03-05 ENCOUNTER — PATIENT MESSAGE (OUTPATIENT)
Dept: FAMILY MEDICINE CLINIC | Facility: CLINIC | Age: 88
End: 2023-03-05
Payer: MEDICARE

## 2023-03-05 DIAGNOSIS — Z00.00 ROUTINE GENERAL MEDICAL EXAMINATION AT A HEALTH CARE FACILITY: Primary | ICD-10-CM

## 2023-03-13 RX ORDER — LEVOTHYROXINE SODIUM 137 UG/1
TABLET ORAL
Qty: 90 TABLET | Refills: 0 | Status: SHIPPED | OUTPATIENT
Start: 2023-03-13

## 2023-03-13 RX ORDER — ATORVASTATIN CALCIUM 80 MG/1
TABLET, FILM COATED ORAL
Qty: 90 TABLET | Refills: 0 | Status: SHIPPED | OUTPATIENT
Start: 2023-03-13

## 2023-03-13 NOTE — TELEPHONE ENCOUNTER
From: Onel Clark  To: Ilir Fair  Sent: 3/5/2023 2:08 PM EST  Subject: Referral to [facility] in [place].    Dr. Fair: We asked for you to send a referral to the above assoc. for Onel and I am including myself. This would be to one of these drsEliz that will be coming to [place] to see patients soon. We would have to go to [place] first, my understanding, and then be able to see that drEliz when they come to [place] . Just wanted to be sure you made a referral for us so they can call us to set up an appt. Thank You as always. [facility] [place]

## 2023-03-19 ENCOUNTER — PATIENT MESSAGE (OUTPATIENT)
Dept: FAMILY MEDICINE CLINIC | Facility: CLINIC | Age: 88
End: 2023-03-19
Payer: MEDICARE

## 2023-03-20 RX ORDER — MEMANTINE HYDROCHLORIDE 5 MG/1
5 TABLET ORAL 2 TIMES DAILY
Qty: 180 TABLET | Refills: 0 | Status: SHIPPED | OUTPATIENT
Start: 2023-03-20

## 2023-05-03 DIAGNOSIS — M48.061 DEGENERATIVE LUMBAR SPINAL STENOSIS: ICD-10-CM

## 2023-05-03 DIAGNOSIS — M79.604 RIGHT LEG PAIN: ICD-10-CM

## 2023-05-05 RX ORDER — LOSARTAN POTASSIUM 50 MG/1
TABLET ORAL
Qty: 90 TABLET | Refills: 0 | Status: SHIPPED | OUTPATIENT
Start: 2023-05-05

## 2023-05-05 RX ORDER — NABUMETONE 750 MG/1
TABLET, FILM COATED ORAL
Qty: 180 TABLET | Refills: 0 | Status: SHIPPED | OUTPATIENT
Start: 2023-05-05

## 2023-05-05 RX ORDER — CLOPIDOGREL BISULFATE 75 MG/1
TABLET ORAL
Qty: 90 TABLET | Refills: 0 | Status: SHIPPED | OUTPATIENT
Start: 2023-05-05

## 2023-05-08 ENCOUNTER — OFFICE VISIT (OUTPATIENT)
Dept: FAMILY MEDICINE CLINIC | Facility: CLINIC | Age: 88
End: 2023-05-08
Payer: MEDICARE

## 2023-05-08 VITALS
HEART RATE: 66 BPM | DIASTOLIC BLOOD PRESSURE: 80 MMHG | WEIGHT: 192 LBS | HEIGHT: 72 IN | BODY MASS INDEX: 26.01 KG/M2 | SYSTOLIC BLOOD PRESSURE: 112 MMHG | OXYGEN SATURATION: 100 %

## 2023-05-08 DIAGNOSIS — T14.8XXA NONHEALING NONSURGICAL WOUND LIMITED TO BREAKDOWN OF SKIN: Primary | ICD-10-CM

## 2023-05-08 NOTE — PROGRESS NOTES
"Chief Complaint  Wound Check    Subjective          Onel Clark presents to [facility]  History of Present Illness    Patient states that he has been struggling with a wound on the ankle.  Patient states that he was walking into Dr. Sparks's office several months ago and scraped the lateral aspect of the left ankle.  Patient states that it has been even better worse better again over the past several months.  Wife states that she has been changing the dressing and is concerned about the drainage coming from the wound.  He is following up with wound care at [facility] currently, but is going to be switching over to [facility] for second opinion.    Objective   Vital Signs:   /80 (BP Location: Left arm, Patient Position: Sitting, Cuff Size: Adult)   Pulse 66   Ht 182.9 cm (72\")   Wt 87.1 kg (192 lb)   SpO2 100%   BMI 26.04 kg/m²     Body mass index is 26.04 kg/m².    Review of Systems    Past History:  Medical History: has a past medical history of Acquired hypothyroidism, Allergies, Arthritis, Benign prostatic hyperplasia with nocturia, Bilateral carotid artery disease, Cancer of the skin, basal cell, Chronic non-seasonal allergic rhinitis, Chronic renal impairment, COPD (chronic obstructive pulmonary disease), Decreased hearing of both ears, Degenerative lumbar spinal stenosis, Diabetes mellitus, Elevated PSA, Frequent falls, GERD without esophagitis, High risk medication use, bilateral hip replacements, decompressive lumbar laminectomy, total knee replacement, bilateral, transient ischemic attack (TIA), Hypertension, Mixed hyperlipidemia due to type 2 diabetes mellitus, Nicotine dependence, No natural teeth, Osteoarthritis, Primary hypertension, Primary osteoarthritis involving multiple joints, 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   Surgical History: has a past surgical history that includes Total hip arthroplasty (Right, 2016); Replacement total knee (Left, 2013); Back surgery (2007); and Carpal tunnel release (2012).   Family History: family history includes Alzheimer's disease in his mother; Coronary artery disease in his father; Diabetes in his sister; Hyperlipidemia in his sister; Hypertension in his sister; Stroke in his father.   Social History: reports that he has quit smoking. He has never used smokeless tobacco. He reports current alcohol use. He reports that he does not use drugs.      Current Outpatient Medications:   •  amLODIPine (NORVASC) 5 MG tablet, TAKE 1 TABLET BY MOUTH EVERY DAY, Disp: 90 tablet, Rfl: 1  •  atorvastatin (LIPITOR) 80 MG tablet, TAKE 1 TABLET BY MOUTH EVERY NIGHT AT BEDTIME, Disp: 90 tablet, Rfl: 0  •  clopidogrel (PLAVIX) 75 MG tablet, TAKE 1 TABLET BY MOUTH EVERY DAY, Disp: 90 tablet, Rfl: 0  •  furosemide (LASIX) 40 MG tablet, Take 1 tablet by mouth Daily., Disp: 30 tablet, Rfl: 2  •  levothyroxine (SYNTHROID, LEVOTHROID) 137 MCG tablet, TAKE 1 TABLET BY MOUTH EVERY DAY, Disp: 90 tablet, Rfl: 0  •  losartan (COZAAR) 50 MG tablet, TAKE 1 TABLET BY MOUTH EVERY DAY, Disp: 90 tablet, Rfl: 0  •  memantine (NAMENDA) 5 MG tablet, Take 1 tablet by mouth 2 (Two) Times a Day., Disp: 180 tablet, Rfl: 0  •  metFORMIN (GLUCOPHAGE) 1000 MG tablet, TAKE 1 TABLET BY MOUTH 2 TIMES A DAY WITH BREAKFAST AND DINNER, Disp: 180 tablet, Rfl: 0  •  nabumetone (RELAFEN) 750 MG tablet, TAKE 1 TABLET BY MOUTH 2 TIMES A DAY, Disp: 180 tablet, Rfl: 0  •  omeprazole (priLOSEC) 40 MG capsule, Take 1 capsule by mouth Daily., Disp: 90 capsule, Rfl: 1  •  potassium chloride (K-DUR,KLOR-CON) 20 MEQ CR tablet, Take 1 tablet by mouth 2 (Two) Times a Day., Disp: 180 tablet, Rfl: 0    Allergies: Patient has no known allergies.    Physical  Exam  Constitutional:       General: He is not in acute distress.     Appearance: He is not ill-appearing or toxic-appearing.   HENT:      Head: Normocephalic and atraumatic.   Cardiovascular:      Rate and Rhythm: Normal rate and regular rhythm.      Heart sounds: No murmur heard.  Pulmonary:      Effort: Pulmonary effort is normal. No respiratory distress.   Skin:     Comments: Non healing wound on the lateral aspect of the ankle with yellow eschar.    Neurological:      General: No focal deficit present.      Mental Status: He is alert and oriented to person, place, and time.   Psychiatric:         Mood and Affect: Mood normal.         Thought Content: Thought content normal.          Result Review :                   Assessment and Plan    Diagnoses and all orders for this visit:    1. Nonhealing nonsurgical wound limited to breakdown of skin (Primary)  -     Ambulatory Referral to Home Health    Refer to home health for evaluation and maintenance of wound on the lateral aspect of ankle.  Recommend the patient follow-up with primary care physician in about 6 weeks for reevaluation.    Follow Up   No follow-ups on file.  Patient was given instructions and counseling regarding his condition or for health maintenance advice. Please see specific information pulled into the AVS if appropriate.     Chiqui Mcbride, DO

## 2023-05-09 ENCOUNTER — HOME HEALTH ADMISSION (OUTPATIENT)
Dept: HOME HEALTH SERVICES | Facility: HOME HEALTHCARE | Age: 88
End: 2023-05-09
Payer: MEDICARE

## 2023-05-10 ENCOUNTER — HOME CARE VISIT (OUTPATIENT)
Dept: HOME HEALTH SERVICES | Facility: HOME HEALTHCARE | Age: 88
End: 2023-05-10
Payer: MEDICARE

## 2023-05-10 PROCEDURE — G0299 HHS/HOSPICE OF RN EA 15 MIN: HCPCS

## 2023-05-10 NOTE — Clinical Note
SOC Note:    Home Health ordered for: SN, PT     Reason for Hosp/Primary Dx/Co-morbidities: physician referral from Dr. Syed due to wound to ankle     Focus of Care: wound assessment and care to left ankle WOUND     Skilled Need: WOUND CARE TO LEFT ANKLE AND ASSESSMENT WEEKLY, INSTRUCTION ON WOUND    Current Functional status/mobility/DME: uses rollator walker     HB status/Living Arrangements: lives with wife IN OWN HOME     Skin Integrity/wound status: wound to ankle LEFT, RAISED CON TUSION TO RIGHT FRONT OF SHIN    Code Status: full code    Fall Risk: high fall risk, reports multiple falls OVER THE LAST FEW MONTHS     POC confirmed with DR SYED 5/10/23    lasix and potassium weekly not daily dr syed knOws        knot to right lower leg from previous fall and bruise, wound center every monday in [place], 6 weeks dr syed    Plan for next visit:SN next week for wound care and assessment

## 2023-05-11 VITALS
SYSTOLIC BLOOD PRESSURE: 165 MMHG | DIASTOLIC BLOOD PRESSURE: 77 MMHG | RESPIRATION RATE: 18 BRPM | OXYGEN SATURATION: 98 % | TEMPERATURE: 97.8 F

## 2023-05-11 RX ORDER — FUROSEMIDE 40 MG/1
40 TABLET ORAL WEEKLY
Qty: 30 TABLET | Refills: 2 | Status: SHIPPED | OUTPATIENT
Start: 2023-05-11

## 2023-05-11 NOTE — HOME HEALTH
SOC Note:    Home Health ordered for: SN, PT     Reason for Hosp/Primary Dx/Co-morbidities: physician referral from Dr. Syed due to wound to ankle     Focus of Care: wound assessment and care to left ankle WOUND     Skilled Need: WOUND CARE TO LEFT ANKLE AND ASSESSMENT WEEKLY, INSTRUCTION ON WOUND    Current Functional status/mobility/DME: uses rollator walker     HB status/Living Arrangements: lives with wife IN OWN HOME     Skin Integrity/wound status: wound to ankle LEFT, RAISED CON TUSION TO RIGHT FRONT OF SHIN    Code Status: full code    Fall Risk: high fall risk, reports multiple falls OVER THE LAST FEW MONTHS     POC confirmed with DR SYED 5/10/23    lasix and potassium weekly not daily dr syed knOws        knot to right lower leg from previous fall and bruise, wound center every monday in [place], 6 weeks dr syed    Plan for next visit:SN next week for wound care and assessment

## 2023-05-15 ENCOUNTER — HOME CARE VISIT (OUTPATIENT)
Dept: HOME HEALTH SERVICES | Facility: HOME HEALTHCARE | Age: 88
End: 2023-05-15
Payer: MEDICARE

## 2023-05-15 VITALS
OXYGEN SATURATION: 99 % | HEART RATE: 70 BPM | DIASTOLIC BLOOD PRESSURE: 74 MMHG | SYSTOLIC BLOOD PRESSURE: 122 MMHG | RESPIRATION RATE: 16 BRPM

## 2023-05-15 PROCEDURE — G0151 HHCP-SERV OF PT,EA 15 MIN: HCPCS

## 2023-05-16 NOTE — HOME HEALTH
PT Eval Note:   Home Health ordered for: SN, PT - adding OT  Reason for Hosp/Primary Dx/Co-morbidities: physician referral from Dr. Fair due to wound to ankle   Focus of Care: HHPT 1wk3 for gait training, balance training, pt education, HEP instruction and therapeutic exercise related to LE wounds and increased fall risk  Current Functional status/mobility/DME: uses rollator walker in home with assistance; has a FWW, shower chair, toilet rails  HB status/Living Arrangements: lives with wife, son assists as needed; pt requires the use of an AD and the physical assistance of another person to safely leave home.   Skin Integrity/wound status: wound to ankle LLE wounds, wound care per SN  Code Status: full code   Fall Risk: high fall risk per [place], reports multiple falls

## 2023-05-17 ENCOUNTER — HOME CARE VISIT (OUTPATIENT)
Dept: HOME HEALTH SERVICES | Facility: HOME HEALTHCARE | Age: 88
End: 2023-05-17
Payer: MEDICARE

## 2023-05-17 VITALS
OXYGEN SATURATION: 98 % | HEART RATE: 78 BPM | SYSTOLIC BLOOD PRESSURE: 132 MMHG | DIASTOLIC BLOOD PRESSURE: 66 MMHG | TEMPERATURE: 97.3 F | RESPIRATION RATE: 16 BRPM

## 2023-05-17 PROCEDURE — G0495 RN CARE TRAIN/EDU IN HH: HCPCS

## 2023-05-17 NOTE — HOME HEALTH
Routine Visit Note:    Skill/education provided: Wound care assessment, measurement and picture  encouraged more water, protein in diet and elevation of lower leg    Patient/caregiver response:  Family and pt voiced undesrtanding.     Plan for next visit: wound assessment and meausrement.  Is pt eating protien, drinking water and elevating leg?    Other pertinent info: Wound clinic appt at [facility] on 5. 25. 23

## 2023-05-18 ENCOUNTER — HOME CARE VISIT (OUTPATIENT)
Dept: HOME HEALTH SERVICES | Facility: HOME HEALTHCARE | Age: 88
End: 2023-05-18
Payer: MEDICARE

## 2023-05-18 PROCEDURE — G0152 HHCP-SERV OF OT,EA 15 MIN: HCPCS

## 2023-05-18 NOTE — HOME HEALTH
OT Evaluation Note:  Home Health ordered for: SN, PT. OT added  Reason for Hosp/Primary Dx/Co-morbidities: physician referral from Dr. Fair due to wound to ankle.   Focus of Care: HHOT 1w2, 2m1 for pt/cg'er ed for EC/WS principles w/ ADL skills, recommendations/training with home safety/fall prevention  Current Functional status/mobility/DME: uses rollator walker   HB status/Living Arrangements: Pt lives with wife w/ all living completed on first floor. Son and DIL provide assist during the day and evenings.  Code Status: full code   Fall Risk: high fall risk, reports multiple falls   Plan for Next Visit: trial TTB, reinforce home safety/fall preventions recommendations

## 2023-05-21 VITALS
RESPIRATION RATE: 16 BRPM | HEART RATE: 65 BPM | DIASTOLIC BLOOD PRESSURE: 70 MMHG | OXYGEN SATURATION: 98 % | SYSTOLIC BLOOD PRESSURE: 130 MMHG

## 2023-05-22 ENCOUNTER — PATIENT MESSAGE (OUTPATIENT)
Dept: FAMILY MEDICINE CLINIC | Facility: CLINIC | Age: 88
End: 2023-05-22
Payer: MEDICARE

## 2023-05-22 ENCOUNTER — TELEPHONE (OUTPATIENT)
Dept: FAMILY MEDICINE CLINIC | Facility: CLINIC | Age: 88
End: 2023-05-22

## 2023-05-22 DIAGNOSIS — E11.9 TYPE 2 DIABETES MELLITUS WITHOUT COMPLICATION, WITHOUT LONG-TERM CURRENT USE OF INSULIN: Primary | ICD-10-CM

## 2023-05-22 DIAGNOSIS — E78.2 MIXED HYPERLIPIDEMIA: ICD-10-CM

## 2023-05-22 NOTE — TELEPHONE ENCOUNTER
Caller: J LUIS DOMINGO    Relationship: Emergency Contact    Best call back number: [phone]    What orders are you requesting (i.e. lab or imaging): LAB     In what timeframe would the patient need to come in: BEFORE THE APPOINTMENT ON 6.19.23 FOR IT TO BE DISCUSSED AT THE VISIT     Where will you receive your lab/imaging services:      Additional notes: PLEASE CALL BACK FOR SCHEDULING, IF NO ANSWER LEAVE A DETAILED MESSAGE.

## 2023-05-23 ENCOUNTER — HOME CARE VISIT (OUTPATIENT)
Dept: HOME HEALTH SERVICES | Facility: HOME HEALTHCARE | Age: 88
End: 2023-05-23
Payer: MEDICARE

## 2023-05-23 VITALS
SYSTOLIC BLOOD PRESSURE: 142 MMHG | OXYGEN SATURATION: 99 % | RESPIRATION RATE: 16 BRPM | DIASTOLIC BLOOD PRESSURE: 82 MMHG | TEMPERATURE: 96.3 F | HEART RATE: 66 BPM

## 2023-05-23 PROCEDURE — G0151 HHCP-SERV OF PT,EA 15 MIN: HCPCS

## 2023-05-24 NOTE — HOME HEALTH
Routine Visit Note:   Skill/education provided: issed and educated re: HEP; tranfser training sit to/from stand ; pt/spouse education re: fall prevention  Patient/caregiver response: pt pleasant and cooperative with partivipation, no c/o pain with ther ex  Plan for next visit: initiate standing exercises if tolerated  Other pertinent info: none

## 2023-05-25 ENCOUNTER — HOME CARE VISIT (OUTPATIENT)
Dept: HOME HEALTH SERVICES | Facility: HOME HEALTHCARE | Age: 88
End: 2023-05-25
Payer: MEDICARE

## 2023-05-25 VITALS
HEART RATE: 67 BPM | SYSTOLIC BLOOD PRESSURE: 124 MMHG | DIASTOLIC BLOOD PRESSURE: 80 MMHG | OXYGEN SATURATION: 96 % | RESPIRATION RATE: 16 BRPM

## 2023-05-25 PROCEDURE — G0152 HHCP-SERV OF OT,EA 15 MIN: HCPCS

## 2023-05-26 ENCOUNTER — HOME CARE VISIT (OUTPATIENT)
Dept: HOME HEALTH SERVICES | Facility: HOME HEALTHCARE | Age: 88
End: 2023-05-26
Payer: MEDICARE

## 2023-05-26 VITALS
HEART RATE: 78 BPM | RESPIRATION RATE: 16 BRPM | DIASTOLIC BLOOD PRESSURE: 66 MMHG | SYSTOLIC BLOOD PRESSURE: 102 MMHG | OXYGEN SATURATION: 97 % | TEMPERATURE: 97.3 F

## 2023-05-26 PROCEDURE — G0495 RN CARE TRAIN/EDU IN HH: HCPCS

## 2023-05-26 NOTE — HOME HEALTH
Routine Visit Note:    Skill/education provided: Wound care and assessment to L lower ankle wouind.  Pt saw WOund clinic at [facility] yesturday. They want them to change dressing every other day and prefer to use hydrafera blue CLASSIC rather that the Hydrafera blue READY.  The clinic gave them some of the CLassic and asked that they use it first. The REady was ordered by homecare as there was no specification of which clinic wanted.  When the Classic is gone the pt may use the  READY.   Wife was instructed and voiced understanding.       Plan for next visit Wound assesmsent and measureme

## 2023-05-29 NOTE — HOME HEALTH
Routine Visit Note:    Skill/education provided: Trial TTB training for walk in shower. Set up w/ son/pt and wife education/training for side stepping into shower and sitting on TTB. Reviewed/reinforced HEP for UB ther ex.     Patient/caregiver response: Pt agrees TTB is better than shower chair    Plan for next visit: Son will purchase shower bench and s/u in shower. Pt bathing from TTB in walk in shower    Other pertinent info:

## 2023-05-31 ENCOUNTER — HOME CARE VISIT (OUTPATIENT)
Dept: HOME HEALTH SERVICES | Facility: HOME HEALTHCARE | Age: 88
End: 2023-05-31
Payer: MEDICARE

## 2023-05-31 VITALS
OXYGEN SATURATION: 100 % | SYSTOLIC BLOOD PRESSURE: 124 MMHG | HEART RATE: 78 BPM | RESPIRATION RATE: 16 BRPM | TEMPERATURE: 97.4 F | DIASTOLIC BLOOD PRESSURE: 78 MMHG

## 2023-05-31 PROCEDURE — G0299 HHS/HOSPICE OF RN EA 15 MIN: HCPCS

## 2023-05-31 RX ORDER — LEVOTHYROXINE SODIUM 137 UG/1
TABLET ORAL
Qty: 90 TABLET | Refills: 0 | Status: SHIPPED | OUTPATIENT
Start: 2023-05-31

## 2023-05-31 RX ORDER — ATORVASTATIN CALCIUM 80 MG/1
TABLET, FILM COATED ORAL
Qty: 90 TABLET | Refills: 0 | Status: SHIPPED | OUTPATIENT
Start: 2023-05-31

## 2023-05-31 NOTE — HOME HEALTH
Routine Visit Note:    Skill/education provided: Wound care.   Packed with hydrafera blue Classic.   Wife indep with wound care on days homecare not present  Pt goes to WOund clinic next week.   Plan for next visit:  WOund care.  Assess for any changes to wound care by the Clinic

## 2023-06-01 ENCOUNTER — HOME CARE VISIT (OUTPATIENT)
Dept: HOME HEALTH SERVICES | Facility: HOME HEALTHCARE | Age: 88
End: 2023-06-01
Payer: MEDICARE

## 2023-06-01 ENCOUNTER — TELEPHONE (OUTPATIENT)
Dept: FAMILY MEDICINE CLINIC | Facility: CLINIC | Age: 88
End: 2023-06-01

## 2023-06-01 VITALS
RESPIRATION RATE: 16 BRPM | TEMPERATURE: 97.7 F | SYSTOLIC BLOOD PRESSURE: 126 MMHG | OXYGEN SATURATION: 99 % | HEART RATE: 68 BPM | DIASTOLIC BLOOD PRESSURE: 78 MMHG

## 2023-06-01 PROCEDURE — G0152 HHCP-SERV OF OT,EA 15 MIN: HCPCS

## 2023-06-01 PROCEDURE — G0151 HHCP-SERV OF PT,EA 15 MIN: HCPCS

## 2023-06-01 NOTE — TELEPHONE ENCOUNTER
Petra from Home Health is calling and is asking to speak to Angelika, about a verbal order. She said she needs a yes or no, in order to place an order for a home health aid for pt, to help him with personal care and bathing.     Petra Seo [phone]

## 2023-06-02 VITALS
HEART RATE: 66 BPM | DIASTOLIC BLOOD PRESSURE: 66 MMHG | SYSTOLIC BLOOD PRESSURE: 122 MMHG | RESPIRATION RATE: 18 BRPM | OXYGEN SATURATION: 99 %

## 2023-06-02 NOTE — HOME HEALTH
Routine Visit Note:    Skill/education provided: Son s/u TTB to correct ht. Wife ed for placement of TTB/lifting bench w/ suction cup on legs. Pt agreed to shower, cover R ankle wound w/ plastic wrap and secured w/ tape. Min assist to undress/dress, transfer in w<>out of walk in shower and washing difficult to reach areas. Pt needs vc/tactile cues to push up and reach back w/ sit to stand on all surfaces. Reviewed HEP- Pt completing ther ex marked off written HEP.      Patient/caregiver response: Pt tolerated session well, bathroom s/u for safe transfer w/ assist.     Plan for next visit: Transfer training, HEP reinforcement, walker safety    Other pertinent info: HHA orders requested w/ KEIKO Lambert completed HHA POC.

## 2023-06-07 ENCOUNTER — HOME CARE VISIT (OUTPATIENT)
Dept: HOME HEALTH SERVICES | Facility: HOME HEALTHCARE | Age: 88
End: 2023-06-07
Payer: MEDICARE

## 2023-06-07 VITALS
OXYGEN SATURATION: 99 % | DIASTOLIC BLOOD PRESSURE: 87 MMHG | TEMPERATURE: 97.9 F | HEART RATE: 81 BPM | SYSTOLIC BLOOD PRESSURE: 140 MMHG

## 2023-06-07 VITALS
RESPIRATION RATE: 16 BRPM | TEMPERATURE: 98.2 F | OXYGEN SATURATION: 99 % | DIASTOLIC BLOOD PRESSURE: 62 MMHG | HEART RATE: 55 BPM | SYSTOLIC BLOOD PRESSURE: 110 MMHG

## 2023-06-07 PROCEDURE — G0299 HHS/HOSPICE OF RN EA 15 MIN: HCPCS

## 2023-06-07 PROCEDURE — G0156 HHCP-SVS OF AIDE,EA 15 MIN: HCPCS

## 2023-06-07 NOTE — HOME HEALTH
Routine Visit Note:    Skill/education provided: Dressing changed to left ankle. Wife will continue to perform dressing changes when SN not present. Has appt w/ wound clinic 6/8/23. pt plans on drinking 1 ensure today.     Patient/caregiver response: pt well tolerated.     Plan for next visit: wound dressing change.

## 2023-06-09 ENCOUNTER — HOME CARE VISIT (OUTPATIENT)
Dept: HOME HEALTH SERVICES | Facility: HOME HEALTHCARE | Age: 88
End: 2023-06-09
Payer: MEDICARE

## 2023-06-09 PROCEDURE — G0152 HHCP-SERV OF OT,EA 15 MIN: HCPCS

## 2023-06-11 VITALS
OXYGEN SATURATION: 100 % | HEART RATE: 89 BPM | RESPIRATION RATE: 16 BRPM | DIASTOLIC BLOOD PRESSURE: 60 MMHG | SYSTOLIC BLOOD PRESSURE: 112 MMHG

## 2023-06-11 NOTE — HOME HEALTH
Routine Visit Note:    Skill/education provided: VC/tactile cue for UB ther ex, RW safety for sit <> stand and transfer for training walk in shower. Pt instr no showering as R ankle infection.     Patient/caregiver response: Pt tolerated session well, Reviewed s/u of thera band for elbow flex/ext-pt completed 3 trials w/ final attempt for accurate s/u.Wife observed.     Plan for next visit: d/c wk of 6/19.    Other pertinent info: Wife states that pt has an infection in L ankle wound and MD recommend no shower's until next MD visit. Pt/wife decline assist w/ sponge bathing and contacted HHA to clx next week visit. FEVER

## 2023-06-15 ENCOUNTER — HOME CARE VISIT (OUTPATIENT)
Dept: HOME HEALTH SERVICES | Facility: HOME HEALTHCARE | Age: 88
End: 2023-06-15
Payer: MEDICARE

## 2023-06-15 NOTE — CASE COMMUNICATION
Patient missed a AIDE visit from [facility] on 6-15-23.     Reason: Patient's spouse called and cancelled today's visit.       For your records only.   As per home health protocol, MD must be notified of missed/cancelled visits; therefore the prescribed frequency was not met.

## 2023-06-19 ENCOUNTER — OFFICE VISIT (OUTPATIENT)
Dept: FAMILY MEDICINE CLINIC | Facility: CLINIC | Age: 88
End: 2023-06-19
Payer: MEDICARE

## 2023-06-19 VITALS
SYSTOLIC BLOOD PRESSURE: 136 MMHG | DIASTOLIC BLOOD PRESSURE: 80 MMHG | HEART RATE: 94 BPM | OXYGEN SATURATION: 98 % | BODY MASS INDEX: 26.95 KG/M2 | HEIGHT: 72 IN | WEIGHT: 199 LBS

## 2023-06-19 DIAGNOSIS — K21.9 GASTROESOPHAGEAL REFLUX DISEASE WITHOUT ESOPHAGITIS: ICD-10-CM

## 2023-06-19 DIAGNOSIS — I10 ESSENTIAL (PRIMARY) HYPERTENSION: ICD-10-CM

## 2023-06-19 DIAGNOSIS — E11.9 TYPE 2 DIABETES MELLITUS WITHOUT COMPLICATION, WITHOUT LONG-TERM CURRENT USE OF INSULIN: ICD-10-CM

## 2023-06-19 DIAGNOSIS — E03.9 ACQUIRED HYPOTHYROIDISM: ICD-10-CM

## 2023-06-19 DIAGNOSIS — M48.061 DEGENERATIVE LUMBAR SPINAL STENOSIS: Primary | ICD-10-CM

## 2023-06-19 DIAGNOSIS — R41.3 MEMORY LOSS: ICD-10-CM

## 2023-06-19 DIAGNOSIS — G45.9 TIA (TRANSIENT ISCHEMIC ATTACK): ICD-10-CM

## 2023-06-19 PROCEDURE — 99214 OFFICE O/P EST MOD 30 MIN: CPT | Performed by: FAMILY MEDICINE

## 2023-06-19 RX ORDER — ATORVASTATIN CALCIUM 80 MG/1
80 TABLET, FILM COATED ORAL
Qty: 90 TABLET | Refills: 1 | Status: SHIPPED | OUTPATIENT
Start: 2023-06-19

## 2023-06-19 RX ORDER — LOSARTAN POTASSIUM 50 MG/1
50 TABLET ORAL DAILY
Qty: 90 TABLET | Refills: 1 | Status: SHIPPED | OUTPATIENT
Start: 2023-06-19

## 2023-06-19 RX ORDER — MEMANTINE HYDROCHLORIDE 5 MG/1
5 TABLET ORAL 2 TIMES DAILY
Qty: 180 TABLET | Refills: 1 | Status: SHIPPED | OUTPATIENT
Start: 2023-06-19

## 2023-06-19 RX ORDER — CLOPIDOGREL BISULFATE 75 MG/1
75 TABLET ORAL DAILY
Qty: 90 TABLET | Refills: 1 | Status: SHIPPED | OUTPATIENT
Start: 2023-06-19

## 2023-06-19 RX ORDER — AMLODIPINE BESYLATE 5 MG/1
5 TABLET ORAL DAILY
Qty: 90 TABLET | Refills: 1 | Status: SHIPPED | OUTPATIENT
Start: 2023-06-19

## 2023-06-19 RX ORDER — OMEPRAZOLE 40 MG/1
40 CAPSULE, DELAYED RELEASE ORAL DAILY
Qty: 90 CAPSULE | Refills: 1 | Status: SHIPPED | OUTPATIENT
Start: 2023-06-19

## 2023-06-19 RX ORDER — MELOXICAM 7.5 MG/1
7.5 TABLET ORAL DAILY
Qty: 30 TABLET | Refills: 5 | Status: SHIPPED | OUTPATIENT
Start: 2023-06-19

## 2023-06-19 RX ORDER — LEVOTHYROXINE SODIUM 137 UG/1
137 TABLET ORAL DAILY
Qty: 90 TABLET | Refills: 1 | Status: SHIPPED | OUTPATIENT
Start: 2023-06-19

## 2023-07-12 ENCOUNTER — TELEPHONE (OUTPATIENT)
Dept: FAMILY MEDICINE CLINIC | Facility: CLINIC | Age: 88
End: 2023-07-12

## 2023-07-12 NOTE — TELEPHONE ENCOUNTER
Caller: J LUIS DOMINGO    Relationship: Emergency Contact    Best call back number: [phone]    Equipment requested: WHEEL CHAIR    Reason for the request: PATIENT IS NEEDING TO HAVE ONE TO USE AT DIFFERENT TIMES. SHE FEELS LIKE MEDICARE WILL PAY FOR IT.    Prescribing Provider: DR. SYED    Additional information or concerns: ALSO SENT MESSAGE THRU MY CHART         SEND ORDER/PRESCRIPTION TO [facility]

## 2023-07-20 ENCOUNTER — TELEPHONE (OUTPATIENT)
Dept: FAMILY MEDICINE CLINIC | Facility: CLINIC | Age: 88
End: 2023-07-20
Payer: MEDICARE

## 2023-07-20 NOTE — TELEPHONE ENCOUNTER
Caller: J LUIS DOMINGO    Relationship: Emergency Contact    Best call back number:  [phone]    What is the best time to reach you: ANY    Who are you requesting to speak with (clinical staff, provider,  specific staff member):     CLINICAL    What was the call regarding:     UNSURE.  PATIENT'S WIFE SAID SOMEONE FROM OUR OFFICE CALLED AND SHE THINKS IT IS ABOUT  MARYLIN    Is it okay if the provider responds through MyChart: NO

## 2023-07-24 ENCOUNTER — TELEMEDICINE (OUTPATIENT)
Dept: FAMILY MEDICINE CLINIC | Facility: CLINIC | Age: 88
End: 2023-07-24
Payer: MEDICARE

## 2023-07-24 DIAGNOSIS — G60.9 HEREDITARY AND IDIOPATHIC PERIPHERAL NEUROPATHY: ICD-10-CM

## 2023-07-24 DIAGNOSIS — M48.061 DEGENERATIVE LUMBAR SPINAL STENOSIS: Primary | ICD-10-CM

## 2023-07-24 PROCEDURE — 99213 OFFICE O/P EST LOW 20 MIN: CPT | Performed by: FAMILY MEDICINE

## 2023-07-24 NOTE — PROGRESS NOTES
Mode of Visit: Video  Location of patient: home  You have chosen to receive care through a telehealth visit.  The patient has signed the video visit consent form.  The visit included audio and video interaction. No technical issues occurred during this visit.     Chief Complaint  WHEELCHAIR PAIGE Clark presents to [facility]    This pt needs a face to face for a manual wheelchair for medicare.  They have a dx of degenerative lumbar spinal stenosis as well as hereditary idiopathic peripheral neuropathy.  The patient has a mobility limitation that impairs their ability to participate in one or more mobility related activities of daily living such as toileting, feeding, dressing, grooming, and bathing in a customary location in the home.  The patient's mobility limitation cannot be sufficiently resolved by the use of an appropriately fitted cane or walker.  The patient's home provides adequate access between rooms, maneuvering space, and surfaces for the use of the manual wheelchair that is provided.  Use of the manual wheelchair will significantly improve the patient's ability to participate in the MRADL's and the patient will use it on a regular basis in the home.  The patient has not expressed an unwillingness to use the manual wheelchair that is provided in the home.  The patient has sufficient upper extremity function and other physical and mental capabilities needed to safely self-propel the manual wheelchair.       Review of Systems   Constitutional:  Negative for fatigue and fever.   HENT:  Negative for congestion and ear pain.    Respiratory:  Negative for apnea, cough, chest tightness and shortness of breath.    Cardiovascular:  Negative for chest pain.   Gastrointestinal:  Negative for abdominal pain, constipation, diarrhea and nausea.   Musculoskeletal:  Negative for arthralgias.   Psychiatric/Behavioral:  Negative for depressed mood and stress.       Objective   Vital Signs:   There were no vitals taken for this visit.    Physical Exam   Constitutional: He appears well-developed.     BMI is >= 25 and <30. (Overweight) The following options were offered after discussion;: exercise counseling/recommendations and nutrition counseling/recommendations            Assessment and Plan    Diagnoses and all orders for this visit:    1. Degenerative lumbar spinal stenosis (Primary)    2. Hereditary and idiopathic peripheral neuropathy        Orders written for appropriate manual wheelchair.  Give extrawide wheelchair because of patient's size.    Follow Up   No follow-ups on file.  Patient was given instructions and counseling regarding his condition or for health maintenance advice. Please see specific information pulled into the AVS if appropriate.

## 2023-07-28 ENCOUNTER — HOME CARE VISIT (OUTPATIENT)
Dept: HOME HEALTH SERVICES | Facility: HOME HEALTHCARE | Age: 88
End: 2023-07-28
Payer: MEDICARE

## 2023-07-28 PROCEDURE — G0299 HHS/HOSPICE OF RN EA 15 MIN: HCPCS

## 2023-07-30 VITALS
HEART RATE: 87 BPM | SYSTOLIC BLOOD PRESSURE: 128 MMHG | DIASTOLIC BLOOD PRESSURE: 78 MMHG | TEMPERATURE: 97.4 F | OXYGEN SATURATION: 100 % | RESPIRATION RATE: 16 BRPM

## 2023-07-30 NOTE — HOME HEALTH
Wound care performed. Pt tolerated well. CP assessment completed. Pt to see wound care clinic on 8.1.23. Pt did have angiogram done on 7.26.23 w/ one stent placed in left leg. Pt to see Dr. Rodriguez on 8.14. Next Visit: Wound care. F/U w/ wound care appt.

## 2023-08-02 ENCOUNTER — HOME CARE VISIT (OUTPATIENT)
Dept: HOME HEALTH SERVICES | Facility: HOME HEALTHCARE | Age: 88
End: 2023-08-02
Payer: MEDICARE

## 2023-08-02 VITALS
HEART RATE: 74 BPM | RESPIRATION RATE: 16 BRPM | DIASTOLIC BLOOD PRESSURE: 60 MMHG | TEMPERATURE: 97.7 F | OXYGEN SATURATION: 98 % | SYSTOLIC BLOOD PRESSURE: 118 MMHG

## 2023-08-02 PROCEDURE — G0299 HHS/HOSPICE OF RN EA 15 MIN: HCPCS

## 2023-08-09 ENCOUNTER — HOME CARE VISIT (OUTPATIENT)
Dept: HOME HEALTH SERVICES | Facility: HOME HEALTHCARE | Age: 88
End: 2023-08-09
Payer: MEDICARE

## 2023-08-09 NOTE — CASE COMMUNICATION
Received orders and notes from wound care today and are filed in Chart Review under Media tab.  Orders apppeared to remain unchanged and Patient to follow up with WCC in 2 weeks.

## 2023-08-10 ENCOUNTER — HOME CARE VISIT (OUTPATIENT)
Dept: HOME HEALTH SERVICES | Facility: HOME HEALTHCARE | Age: 88
End: 2023-08-10
Payer: MEDICARE

## 2023-08-10 VITALS
SYSTOLIC BLOOD PRESSURE: 124 MMHG | OXYGEN SATURATION: 98 % | HEART RATE: 68 BPM | TEMPERATURE: 97.7 F | DIASTOLIC BLOOD PRESSURE: 74 MMHG | RESPIRATION RATE: 18 BRPM

## 2023-08-10 PROCEDURE — G0299 HHS/HOSPICE OF RN EA 15 MIN: HCPCS

## 2023-08-10 NOTE — HOME HEALTH
Wound care done to L ankle. pt sees wound clinic again in 2 weeks. Has jonot w/ Dr. Rodriguez 8.14. Next visit: f/u w/ maddie. Wound care.

## 2023-08-13 DIAGNOSIS — R41.3 MEMORY LOSS: ICD-10-CM

## 2023-08-14 RX ORDER — MEMANTINE HYDROCHLORIDE 5 MG/1
TABLET ORAL
Qty: 180 TABLET | Refills: 1 | Status: SHIPPED | OUTPATIENT
Start: 2023-08-14

## 2023-08-15 ENCOUNTER — HOME CARE VISIT (OUTPATIENT)
Dept: HOME HEALTH SERVICES | Facility: HOME HEALTHCARE | Age: 88
End: 2023-08-15
Payer: MEDICARE

## 2023-08-15 NOTE — CASE COMMUNICATION
Patient missed a HHAIDE visit from [facility] on 8-15-23.     Reason: N/A.       For your records only.   As per home health protocol, MD must be notified of missed/cancelled visits; therefore the prescribed frequency was not met.

## 2023-08-16 ENCOUNTER — HOME CARE VISIT (OUTPATIENT)
Dept: HOME HEALTH SERVICES | Facility: HOME HEALTHCARE | Age: 88
End: 2023-08-16
Payer: MEDICARE

## 2023-08-16 VITALS
HEART RATE: 78 BPM | RESPIRATION RATE: 16 BRPM | DIASTOLIC BLOOD PRESSURE: 70 MMHG | SYSTOLIC BLOOD PRESSURE: 124 MMHG | OXYGEN SATURATION: 98 % | TEMPERATURE: 97.8 F

## 2023-08-16 PROCEDURE — G0299 HHS/HOSPICE OF RN EA 15 MIN: HCPCS

## 2023-08-16 NOTE — HOME HEALTH
Wound care done to Left ankle. Pt tolerated well. Educated importance of protein to promote wound healing. Pt saw Dr. Rodriguez on 8.14.23. Wife states they aren't to change dressing unless soiled. SN has left message w/ Dr. Rodrigeuz to clarify wound care. Lasix has been started daily as well as antibiotics as culture from wound came back w/ infection. Will order supplies. Next visit: wound care and medication education.

## 2023-08-18 ENCOUNTER — HOME CARE VISIT (OUTPATIENT)
Dept: HOME HEALTH SERVICES | Facility: HOME HEALTHCARE | Age: 88
End: 2023-08-18
Payer: MEDICARE

## 2023-08-18 NOTE — CASE COMMUNICATION
Patient missed a HHAIDE visit from [facility] on 8-18-23.     Reason: N/A.       For your records only.   As per home health protocol, MD must be notified of missed/cancelled visits; therefore the prescribed frequency was not met.

## 2023-08-22 ENCOUNTER — HOME CARE VISIT (OUTPATIENT)
Dept: HOME HEALTH SERVICES | Facility: HOME HEALTHCARE | Age: 88
End: 2023-08-22
Payer: MEDICARE

## 2023-08-22 PROCEDURE — G0156 HHCP-SVS OF AIDE,EA 15 MIN: HCPCS

## 2023-08-24 ENCOUNTER — HOME CARE VISIT (OUTPATIENT)
Dept: HOME HEALTH SERVICES | Facility: HOME HEALTHCARE | Age: 88
End: 2023-08-24
Payer: MEDICARE

## 2023-08-24 NOTE — CASE COMMUNICATION
Patient missed a AIDE  visit from [facility] on 8-24-23.     Reason: Patient called an canceled.       For your records only.   As per home health protocol, MD must be notified of missed/cancelled visits; therefore the prescribed frequency was not met.

## 2023-08-25 ENCOUNTER — HOME CARE VISIT (OUTPATIENT)
Dept: HOME HEALTH SERVICES | Facility: HOME HEALTHCARE | Age: 88
End: 2023-08-25
Payer: MEDICARE

## 2023-08-25 VITALS
HEART RATE: 72 BPM | DIASTOLIC BLOOD PRESSURE: 78 MMHG | TEMPERATURE: 97.7 F | SYSTOLIC BLOOD PRESSURE: 124 MMHG | RESPIRATION RATE: 16 BRPM | OXYGEN SATURATION: 99 %

## 2023-08-25 PROCEDURE — G0299 HHS/HOSPICE OF RN EA 15 MIN: HCPCS

## 2023-08-25 NOTE — HOME HEALTH
Routine Visit Note:    Skill/education provided: Wound care completed. Received phone call from wound clinic to do wound care twice a week on Mondays and Fridays. Pt will go to wound care clinic on Wednesdays.     Patient/caregiver response:  pt tolerated well.     Plan for next visit: Wound care to Left ankle

## 2023-08-28 ENCOUNTER — HOME CARE VISIT (OUTPATIENT)
Dept: HOME HEALTH SERVICES | Facility: HOME HEALTHCARE | Age: 88
End: 2023-08-28
Payer: MEDICARE

## 2023-08-28 VITALS
HEART RATE: 64 BPM | DIASTOLIC BLOOD PRESSURE: 62 MMHG | RESPIRATION RATE: 16 BRPM | SYSTOLIC BLOOD PRESSURE: 110 MMHG | OXYGEN SATURATION: 98 % | TEMPERATURE: 97.7 F

## 2023-08-28 PROCEDURE — G0299 HHS/HOSPICE OF RN EA 15 MIN: HCPCS

## 2023-08-29 NOTE — HOME HEALTH
Wound care done to left ankle. pt tolerated well. pt sees wound clinic on Wednesday. Educated on importance of protein to promote wound healing. Next visit: wound care. f/u w// wound clinic visit.

## 2023-08-31 ENCOUNTER — HOME CARE VISIT (OUTPATIENT)
Dept: HOME HEALTH SERVICES | Facility: HOME HEALTHCARE | Age: 88
End: 2023-08-31
Payer: MEDICARE

## 2023-08-31 VITALS
TEMPERATURE: 97.9 F | SYSTOLIC BLOOD PRESSURE: 136 MMHG | RESPIRATION RATE: 16 BRPM | HEART RATE: 74 BPM | OXYGEN SATURATION: 98 % | DIASTOLIC BLOOD PRESSURE: 86 MMHG

## 2023-08-31 PROCEDURE — G0156 HHCP-SVS OF AIDE,EA 15 MIN: HCPCS

## 2023-09-01 ENCOUNTER — HOME CARE VISIT (OUTPATIENT)
Dept: HOME HEALTH SERVICES | Facility: HOME HEALTHCARE | Age: 88
End: 2023-09-01
Payer: MEDICARE

## 2023-09-01 PROCEDURE — G0299 HHS/HOSPICE OF RN EA 15 MIN: HCPCS

## 2023-09-03 VITALS
RESPIRATION RATE: 16 BRPM | DIASTOLIC BLOOD PRESSURE: 68 MMHG | HEART RATE: 74 BPM | TEMPERATURE: 97.8 F | OXYGEN SATURATION: 98 % | SYSTOLIC BLOOD PRESSURE: 110 MMHG

## 2023-09-03 NOTE — HOME HEALTH
Pt educated on importance of protein to promote wound healing.New wound care completed. Will order supplies for new wound care. pt tolerated well. CP assessment negative. No new medications. Next visit: wound care/CP assessment.

## 2023-09-04 ENCOUNTER — HOME CARE VISIT (OUTPATIENT)
Dept: HOME HEALTH SERVICES | Facility: HOME HEALTHCARE | Age: 88
End: 2023-09-04
Payer: MEDICARE

## 2023-09-04 VITALS
SYSTOLIC BLOOD PRESSURE: 124 MMHG | HEART RATE: 70 BPM | DIASTOLIC BLOOD PRESSURE: 68 MMHG | RESPIRATION RATE: 18 BRPM | TEMPERATURE: 97 F | OXYGEN SATURATION: 96 %

## 2023-09-04 PROCEDURE — G0300 HHS/HOSPICE OF LPN EA 15 MIN: HCPCS

## 2023-09-06 ENCOUNTER — HOME CARE VISIT (OUTPATIENT)
Dept: HOME HEALTH SERVICES | Facility: HOME HEALTHCARE | Age: 88
End: 2023-09-06
Payer: MEDICARE

## 2023-09-06 PROCEDURE — G0495 RN CARE TRAIN/EDU IN HH: HCPCS

## 2023-09-07 ENCOUNTER — HOME CARE VISIT (OUTPATIENT)
Dept: HOME HEALTH SERVICES | Facility: HOME HEALTHCARE | Age: 88
End: 2023-09-07
Payer: MEDICARE

## 2023-09-07 VITALS
TEMPERATURE: 97.7 F | RESPIRATION RATE: 16 BRPM | DIASTOLIC BLOOD PRESSURE: 68 MMHG | OXYGEN SATURATION: 100 % | HEART RATE: 67 BPM | SYSTOLIC BLOOD PRESSURE: 110 MMHG

## 2023-09-07 VITALS
TEMPERATURE: 97.7 F | RESPIRATION RATE: 16 BRPM | OXYGEN SATURATION: 95 % | SYSTOLIC BLOOD PRESSURE: 132 MMHG | DIASTOLIC BLOOD PRESSURE: 76 MMHG | HEART RATE: 62 BPM

## 2023-09-07 PROCEDURE — G0156 HHCP-SVS OF AIDE,EA 15 MIN: HCPCS

## 2023-09-07 NOTE — CASE COMMUNICATION
60 Day Summary  Home Health need continues for: Skilled Nursing  Primary diagnoses/co-morbidities/recent procedures in past 60 days that impact current episode: Open wound to left ankle  Current level of functional ability: Ambulates w/ rollator w/ assist x 1 occasionally  Homebound status and living arrangements: pt lives at home w/ wife. Son and daughter in law frequently check in and take to medical appts.  Goals accomplished and/or  measurable progress toward unmet goals in past 60 days: SN to perform wound care on MF and pt sees [facility] on Wednesdays.  Focus of care for next 60 days for each discipline ordered: assessment of wound  Skin integrity/wound status: Pt saw Wound clinic today who did wound care. Will assess wound Friday.  Estimated date when home care services will end 9 weeks  SDOH changes/barriers (i.e. Caregiver availability, social isolat ion, environment, income, transportation access, food insecurity etc.)   Need for MSW referral? N  Plan for next visit wound care/assessment/importance of protein in diet education

## 2023-09-07 NOTE — HOME HEALTH
60 Day Summary  Home Health need continues for: Skilled Nursing  Primary diagnoses/co-morbidities/recent procedures in past 60 days that impact current episode: Open wound to left ankle  Current level of functional ability: Ambulates w/ rollator w/ assist x 1 occasionally  Homebound status and living arrangements: pt lives at home w/ wife. Son and daughter in law frequently check in and take to medical appts.  Goals accomplished and/or measurable progress toward unmet goals in past 60 days: SN to perform wound care on MF and pt sees [facility] on Wednesdays.  Focus of care for next 60 days for each discipline ordered: assessment of wound  Skin integrity/wound status: Pt saw Wound clinic today who did wound care. Will assess wound Friday.  Estimated date when home care services will end 9 weeks  SDOH changes/barriers (i.e. Caregiver availability, social isolation, environment, income, transportation access, food insecurity etc.)   Need for MSW referral? N  Plan for next visit wound care/assessment/importance of protein in diet education

## 2023-09-08 ENCOUNTER — HOME CARE VISIT (OUTPATIENT)
Dept: HOME HEALTH SERVICES | Facility: HOME HEALTHCARE | Age: 88
End: 2023-09-08
Payer: MEDICARE

## 2023-09-08 PROCEDURE — G0299 HHS/HOSPICE OF RN EA 15 MIN: HCPCS

## 2023-09-09 VITALS
TEMPERATURE: 97.7 F | SYSTOLIC BLOOD PRESSURE: 122 MMHG | HEART RATE: 66 BPM | OXYGEN SATURATION: 100 % | RESPIRATION RATE: 16 BRPM | DIASTOLIC BLOOD PRESSURE: 70 MMHG

## 2023-09-09 NOTE — HOME HEALTH
Wound care completed. Pt tolerated well. Will order supplies. Educated on importance of taking shoes off when resting to prevent skin breakdown due to them rubbing the side of the foot. Next Visit: wound care

## 2023-09-11 ENCOUNTER — HOME CARE VISIT (OUTPATIENT)
Dept: HOME HEALTH SERVICES | Facility: HOME HEALTHCARE | Age: 88
End: 2023-09-11
Payer: MEDICARE

## 2023-09-11 VITALS
DIASTOLIC BLOOD PRESSURE: 68 MMHG | OXYGEN SATURATION: 98 % | HEART RATE: 57 BPM | RESPIRATION RATE: 16 BRPM | SYSTOLIC BLOOD PRESSURE: 110 MMHG | TEMPERATURE: 97.8 F

## 2023-09-11 PROCEDURE — G0299 HHS/HOSPICE OF RN EA 15 MIN: HCPCS

## 2023-09-11 NOTE — CASE COMMUNICATION
Pt has been taking Lasix daily per Dr. Rodriguez for about 2 weeks to decrease edema in left extremity, but did not write for a new script. Wife spoke w/ Jennifer office today to clarify if they needed to keep doing this because edema is gone and concerned of pt getting dehydrated and [facility] because he doesn't drink enough water, but office stated Jennifer isn't the prescriber so would need to contact you. How do you want him to take his lasix ?

## 2023-09-12 NOTE — HOME HEALTH
Wound care completed. pt tolerated well. Pt states his leg feels numb. pt foot warm w/ pulses felt. Wrapped coban slightly looser. pt has wound care clinic on Wednesday and wife will let me know if wound care changes for supply ordering. Wife had questions on how to take the Lasix-inbasket sent to PCP. Next visit: Wound care/assessment/medication education

## 2023-09-13 ENCOUNTER — HOME CARE VISIT (OUTPATIENT)
Dept: HOME HEALTH SERVICES | Facility: HOME HEALTHCARE | Age: 88
End: 2023-09-13
Payer: MEDICARE

## 2023-09-13 VITALS
TEMPERATURE: 97.9 F | DIASTOLIC BLOOD PRESSURE: 76 MMHG | SYSTOLIC BLOOD PRESSURE: 131 MMHG | HEART RATE: 62 BPM | RESPIRATION RATE: 16 BRPM | OXYGEN SATURATION: 99 %

## 2023-09-13 PROCEDURE — G0156 HHCP-SVS OF AIDE,EA 15 MIN: HCPCS

## 2023-09-15 ENCOUNTER — HOME CARE VISIT (OUTPATIENT)
Dept: HOME HEALTH SERVICES | Facility: HOME HEALTHCARE | Age: 88
End: 2023-09-15
Payer: MEDICARE

## 2023-09-15 VITALS
SYSTOLIC BLOOD PRESSURE: 112 MMHG | RESPIRATION RATE: 18 BRPM | DIASTOLIC BLOOD PRESSURE: 72 MMHG | HEART RATE: 58 BPM | OXYGEN SATURATION: 99 % | TEMPERATURE: 97.7 F

## 2023-09-15 PROCEDURE — G0299 HHS/HOSPICE OF RN EA 15 MIN: HCPCS

## 2023-09-15 NOTE — HOME HEALTH
Wound care done and assessed.Pt tolerated well. Pt has been elevating legs to prevent edema. Wound appears smaller. pt does not have wound clinic next week and will need SN to come MWF. Next visit: Wound care/assess

## 2023-09-16 ENCOUNTER — PATIENT MESSAGE (OUTPATIENT)
Dept: FAMILY MEDICINE CLINIC | Facility: CLINIC | Age: 88
End: 2023-09-16
Payer: MEDICARE

## 2023-09-18 ENCOUNTER — HOME CARE VISIT (OUTPATIENT)
Dept: HOME HEALTH SERVICES | Facility: HOME HEALTHCARE | Age: 88
End: 2023-09-18
Payer: MEDICARE

## 2023-09-18 VITALS
DIASTOLIC BLOOD PRESSURE: 78 MMHG | RESPIRATION RATE: 18 BRPM | HEART RATE: 59 BPM | OXYGEN SATURATION: 98 % | TEMPERATURE: 97.8 F | SYSTOLIC BLOOD PRESSURE: 130 MMHG

## 2023-09-18 PROCEDURE — G0299 HHS/HOSPICE OF RN EA 15 MIN: HCPCS

## 2023-09-18 NOTE — HOME HEALTH
Wound care completed. Pt wound improving. Pt educated on drinking water and protein shakes to promote wound healing. VSS. No new medications. Will go back to wound care clinic on 9/27.  Next Visit: wound care/assessment.

## 2023-09-19 ENCOUNTER — HOME CARE VISIT (OUTPATIENT)
Dept: HOME HEALTH SERVICES | Facility: HOME HEALTHCARE | Age: 88
End: 2023-09-19
Payer: MEDICARE

## 2023-09-19 VITALS
HEART RATE: 64 BPM | DIASTOLIC BLOOD PRESSURE: 85 MMHG | RESPIRATION RATE: 16 BRPM | SYSTOLIC BLOOD PRESSURE: 137 MMHG | TEMPERATURE: 97.8 F | OXYGEN SATURATION: 99 %

## 2023-09-19 PROCEDURE — G0156 HHCP-SVS OF AIDE,EA 15 MIN: HCPCS

## 2023-09-20 ENCOUNTER — HOME CARE VISIT (OUTPATIENT)
Dept: HOME HEALTH SERVICES | Facility: HOME HEALTHCARE | Age: 88
End: 2023-09-20
Payer: MEDICARE

## 2023-09-20 VITALS
OXYGEN SATURATION: 100 % | HEART RATE: 66 BPM | RESPIRATION RATE: 16 BRPM | TEMPERATURE: 97.9 F | DIASTOLIC BLOOD PRESSURE: 68 MMHG | SYSTOLIC BLOOD PRESSURE: 128 MMHG

## 2023-09-20 PROCEDURE — G0299 HHS/HOSPICE OF RN EA 15 MIN: HCPCS

## 2023-09-20 NOTE — HOME HEALTH
Wound care completed. Pt tolerated well. Educated on elevation of legs. VSS. No new medications. Pt has wound care clinic next week. Will need to order supplies if continuing current wound care. Next visit: Wound care/assessment/nutrition education

## 2023-09-21 NOTE — TELEPHONE ENCOUNTER
From: Onel Clark  To: Ilir Fair  Sent: 9/16/2023 2:01 PM EDT  Subject: Water Pill used by Onel.    Dr. Fair: Dr. Barrera told Onel to use the water every day until he dries up - I called him and they told me he said to come back to you for an answer. So, he has been taking them almost every day and seems to be losing weight and leg is very small and does not look like there is any weight on it. Do not know where to go with this - should I continue a couple of times a week or what? do not want him to get dehydrated. Need some help please ! Thank you ! [facility]

## 2023-09-22 ENCOUNTER — HOME CARE VISIT (OUTPATIENT)
Dept: HOME HEALTH SERVICES | Facility: HOME HEALTHCARE | Age: 88
End: 2023-09-22
Payer: MEDICARE

## 2023-09-22 VITALS
TEMPERATURE: 98.2 F | OXYGEN SATURATION: 97 % | DIASTOLIC BLOOD PRESSURE: 64 MMHG | RESPIRATION RATE: 16 BRPM | HEART RATE: 57 BPM | SYSTOLIC BLOOD PRESSURE: 128 MMHG

## 2023-09-22 PROCEDURE — G0299 HHS/HOSPICE OF RN EA 15 MIN: HCPCS

## 2023-09-22 NOTE — HOME HEALTH
Routine Visit Note:    Skill/education provided: Wound care completed. pt tolerated well. Next visit: Wound care and assessment. pt has wound care clinic next Wednesday.

## 2023-09-25 ENCOUNTER — HOME CARE VISIT (OUTPATIENT)
Dept: HOME HEALTH SERVICES | Facility: HOME HEALTHCARE | Age: 88
End: 2023-09-25

## 2023-09-25 VITALS
DIASTOLIC BLOOD PRESSURE: 87 MMHG | RESPIRATION RATE: 16 BRPM | SYSTOLIC BLOOD PRESSURE: 142 MMHG | TEMPERATURE: 97.9 F | OXYGEN SATURATION: 96 % | HEART RATE: 72 BPM

## 2023-09-25 VITALS
OXYGEN SATURATION: 99 % | SYSTOLIC BLOOD PRESSURE: 122 MMHG | HEART RATE: 76 BPM | RESPIRATION RATE: 16 BRPM | TEMPERATURE: 97.7 F | DIASTOLIC BLOOD PRESSURE: 68 MMHG

## 2023-09-25 DIAGNOSIS — I10 ESSENTIAL (PRIMARY) HYPERTENSION: ICD-10-CM

## 2023-09-25 PROCEDURE — G0299 HHS/HOSPICE OF RN EA 15 MIN: HCPCS

## 2023-09-25 PROCEDURE — G0156 HHCP-SVS OF AIDE,EA 15 MIN: HCPCS

## 2023-09-25 NOTE — HOME HEALTH
Wound care/assessment completed and documented in Olivia. Wound improving. pt tolerated well. Has wound care clinic on Wednesday. Next visit: wound care/assessment.

## 2023-09-26 RX ORDER — AMLODIPINE BESYLATE 5 MG/1
TABLET ORAL
Qty: 90 TABLET | Refills: 1 | Status: SHIPPED | OUTPATIENT
Start: 2023-09-26

## 2023-09-28 ENCOUNTER — TELEPHONE (OUTPATIENT)
Dept: FAMILY MEDICINE CLINIC | Facility: CLINIC | Age: 88
End: 2023-09-28
Payer: MEDICARE

## 2023-09-28 NOTE — TELEPHONE ENCOUNTER
Caller: JENNIFER- PARAMIDRIS    Relationship to patient: Other    Best call back number: [phone]     Patient is needing: JENNIFER STATES HE SENT A FAX TODAY 9.28.23 WHICH NEED TO BE FILLED OUT AND SIGNED. HE IS REQUESTING TO KNOW IF THE OFFICE RECEIVED IT.

## 2023-09-29 ENCOUNTER — HOME CARE VISIT (OUTPATIENT)
Dept: HOME HEALTH SERVICES | Facility: HOME HEALTHCARE | Age: 88
End: 2023-09-29
Payer: MEDICARE

## 2023-09-29 VITALS
OXYGEN SATURATION: 99 % | HEART RATE: 57 BPM | TEMPERATURE: 97.9 F | SYSTOLIC BLOOD PRESSURE: 130 MMHG | RESPIRATION RATE: 16 BRPM | DIASTOLIC BLOOD PRESSURE: 72 MMHG

## 2023-09-29 PROCEDURE — G0299 HHS/HOSPICE OF RN EA 15 MIN: HCPCS

## 2023-09-29 NOTE — TELEPHONE ENCOUNTER
Caller: UDAY    Relationship: Other    Best call back number: [phone] EXT. [phone]    What form or medical record are you requesting: [facility] FUNCTIONAL AND COGNITIVE SCREENING FORM     Who is requesting this form or medical record from you: UDAY     How would you like to receive the form or medical records (pick-up, mail, fax): FAX  If fax, what is the fax number: [phone]  If mail, what is the address:   If pick-up, provide patient with address and location details    Timeframe paperwork needed: ASAP    Additional notes: JENNIFER STATES THIS FORM WAS FAXED 9/28/23 TO PCP TO COMPLETE AND FAX BACK.

## 2023-10-01 NOTE — HOME HEALTH
Wound care completed and improving. SN educated to elevate legs. Will see wound clinic on Oct 11th.  Next visit: Wound care/assessment

## 2023-10-02 ENCOUNTER — HOME CARE VISIT (OUTPATIENT)
Dept: HOME HEALTH SERVICES | Facility: HOME HEALTHCARE | Age: 88
End: 2023-10-02
Payer: MEDICARE

## 2023-10-02 VITALS
HEART RATE: 60 BPM | DIASTOLIC BLOOD PRESSURE: 72 MMHG | SYSTOLIC BLOOD PRESSURE: 118 MMHG | OXYGEN SATURATION: 99 % | RESPIRATION RATE: 18 BRPM | TEMPERATURE: 97.7 F

## 2023-10-02 PROCEDURE — G0299 HHS/HOSPICE OF RN EA 15 MIN: HCPCS

## 2023-10-02 NOTE — HOME HEALTH
Wound care and assessment complete. Pt tolerated well. Will see wound clinic 10/11. Pt left knee slightly edematous. Pt educated on importance of walking, repositioning, and elevation. Next visit: Wound care

## 2023-10-03 ENCOUNTER — HOME CARE VISIT (OUTPATIENT)
Dept: HOME HEALTH SERVICES | Facility: HOME HEALTHCARE | Age: 88
End: 2023-10-03
Payer: MEDICARE

## 2023-10-03 VITALS — OXYGEN SATURATION: 96 % | HEART RATE: 62 BPM | RESPIRATION RATE: 16 BRPM | TEMPERATURE: 97.7 F

## 2023-10-03 PROCEDURE — G0156 HHCP-SVS OF AIDE,EA 15 MIN: HCPCS

## 2023-10-04 ENCOUNTER — HOME CARE VISIT (OUTPATIENT)
Dept: HOME HEALTH SERVICES | Facility: HOME HEALTHCARE | Age: 88
End: 2023-10-04
Payer: MEDICARE

## 2023-10-04 VITALS
OXYGEN SATURATION: 100 % | RESPIRATION RATE: 16 BRPM | TEMPERATURE: 97.5 F | DIASTOLIC BLOOD PRESSURE: 74 MMHG | SYSTOLIC BLOOD PRESSURE: 132 MMHG | HEART RATE: 72 BPM

## 2023-10-04 PROCEDURE — G0299 HHS/HOSPICE OF RN EA 15 MIN: HCPCS

## 2023-10-05 NOTE — HOME HEALTH
Wound care completed. Pt tolerated well. Educated on importance of elevation of legs. Wound care clinic on 10/11. Next visit: wound care

## 2023-10-06 ENCOUNTER — HOME CARE VISIT (OUTPATIENT)
Dept: HOME HEALTH SERVICES | Facility: HOME HEALTHCARE | Age: 88
End: 2023-10-06
Payer: MEDICARE

## 2023-10-06 VITALS
HEART RATE: 58 BPM | OXYGEN SATURATION: 100 % | TEMPERATURE: 97.2 F | SYSTOLIC BLOOD PRESSURE: 130 MMHG | DIASTOLIC BLOOD PRESSURE: 68 MMHG | RESPIRATION RATE: 16 BRPM

## 2023-10-06 PROCEDURE — G0299 HHS/HOSPICE OF RN EA 15 MIN: HCPCS

## 2023-10-09 ENCOUNTER — HOME CARE VISIT (OUTPATIENT)
Dept: HOME HEALTH SERVICES | Facility: HOME HEALTHCARE | Age: 88
End: 2023-10-09
Payer: MEDICARE

## 2023-10-09 VITALS
HEART RATE: 72 BPM | TEMPERATURE: 97.2 F | DIASTOLIC BLOOD PRESSURE: 68 MMHG | RESPIRATION RATE: 18 BRPM | SYSTOLIC BLOOD PRESSURE: 132 MMHG | OXYGEN SATURATION: 96 %

## 2023-10-09 PROCEDURE — G0299 HHS/HOSPICE OF RN EA 15 MIN: HCPCS

## 2023-10-09 NOTE — HOME HEALTH
Routine Visit Note:    Skill/education provided: Wound care and assessment completed. VSS. Negative CP assessment. No new medications.     Patient/caregiver response: Pt tolerated well.    Plan for next visit: Wound care/assessment    Other pertinent info: pt sees wound clinic 10/11

## 2023-10-10 ENCOUNTER — HOME CARE VISIT (OUTPATIENT)
Dept: HOME HEALTH SERVICES | Facility: HOME HEALTHCARE | Age: 88
End: 2023-10-10
Payer: MEDICARE

## 2023-10-10 VITALS
OXYGEN SATURATION: 99 % | TEMPERATURE: 97.9 F | SYSTOLIC BLOOD PRESSURE: 155 MMHG | RESPIRATION RATE: 16 BRPM | HEART RATE: 83 BPM | DIASTOLIC BLOOD PRESSURE: 76 MMHG

## 2023-10-10 PROCEDURE — G0156 HHCP-SVS OF AIDE,EA 15 MIN: HCPCS

## 2023-10-13 ENCOUNTER — HOME CARE VISIT (OUTPATIENT)
Dept: HOME HEALTH SERVICES | Facility: HOME HEALTHCARE | Age: 88
End: 2023-10-13
Payer: MEDICARE

## 2023-10-13 VITALS
HEART RATE: 69 BPM | SYSTOLIC BLOOD PRESSURE: 149 MMHG | RESPIRATION RATE: 16 BRPM | TEMPERATURE: 97.8 F | OXYGEN SATURATION: 99 % | DIASTOLIC BLOOD PRESSURE: 80 MMHG

## 2023-10-13 PROCEDURE — G0299 HHS/HOSPICE OF RN EA 15 MIN: HCPCS

## 2023-10-13 NOTE — HOME HEALTH
wound care completed. Pt sees wound clinic in 2 weeks. Wound improving. Next visit: Wound care/assessment

## 2023-10-16 ENCOUNTER — HOME CARE VISIT (OUTPATIENT)
Dept: HOME HEALTH SERVICES | Facility: HOME HEALTHCARE | Age: 88
End: 2023-10-16
Payer: MEDICARE

## 2023-10-16 VITALS
HEART RATE: 44 BPM | OXYGEN SATURATION: 99 % | SYSTOLIC BLOOD PRESSURE: 122 MMHG | RESPIRATION RATE: 16 BRPM | DIASTOLIC BLOOD PRESSURE: 68 MMHG | TEMPERATURE: 98.5 F

## 2023-10-16 PROCEDURE — G0299 HHS/HOSPICE OF RN EA 15 MIN: HCPCS

## 2023-10-16 NOTE — HOME HEALTH
Wound care and assessment completed. Pt tolerated well. Will see wound clinic next wednesday. Next visit: wound care.

## 2023-10-17 ENCOUNTER — HOME CARE VISIT (OUTPATIENT)
Dept: HOME HEALTH SERVICES | Facility: HOME HEALTHCARE | Age: 88
End: 2023-10-17
Payer: MEDICARE

## 2023-10-17 VITALS
DIASTOLIC BLOOD PRESSURE: 75 MMHG | SYSTOLIC BLOOD PRESSURE: 141 MMHG | OXYGEN SATURATION: 98 % | TEMPERATURE: 97.9 F | HEART RATE: 77 BPM | RESPIRATION RATE: 16 BRPM

## 2023-10-17 PROCEDURE — G0156 HHCP-SVS OF AIDE,EA 15 MIN: HCPCS

## 2023-10-20 ENCOUNTER — HOME CARE VISIT (OUTPATIENT)
Dept: HOME HEALTH SERVICES | Facility: HOME HEALTHCARE | Age: 88
End: 2023-10-20
Payer: MEDICARE

## 2023-10-20 VITALS
RESPIRATION RATE: 16 BRPM | TEMPERATURE: 98.1 F | OXYGEN SATURATION: 99 % | SYSTOLIC BLOOD PRESSURE: 130 MMHG | HEART RATE: 43 BPM | DIASTOLIC BLOOD PRESSURE: 66 MMHG

## 2023-10-20 PROCEDURE — G0299 HHS/HOSPICE OF RN EA 15 MIN: HCPCS

## 2023-10-21 ENCOUNTER — HOME CARE VISIT (OUTPATIENT)
Dept: HOME HEALTH SERVICES | Facility: HOME HEALTHCARE | Age: 88
End: 2023-10-21
Payer: MEDICARE

## 2023-10-21 NOTE — CASE COMMUNICATION
SN Missed Visit Note: saw pt Friday 10/20 for dressing change. Will see pt again on Monday for dressing change

## 2023-10-23 ENCOUNTER — HOME CARE VISIT (OUTPATIENT)
Dept: HOME HEALTH SERVICES | Facility: HOME HEALTHCARE | Age: 88
End: 2023-10-23
Payer: MEDICARE

## 2023-10-23 VITALS
OXYGEN SATURATION: 100 % | TEMPERATURE: 97.2 F | RESPIRATION RATE: 18 BRPM | SYSTOLIC BLOOD PRESSURE: 132 MMHG | HEART RATE: 79 BPM | DIASTOLIC BLOOD PRESSURE: 70 MMHG

## 2023-10-23 PROCEDURE — G0299 HHS/HOSPICE OF RN EA 15 MIN: HCPCS

## 2023-10-23 NOTE — HOME HEALTH
Wound care completed. Pt tolerated well. VSS. Negative CP assessment. Pt has wound care clinic on Wednesday. Next visit:wound care/assessment

## 2023-10-24 ENCOUNTER — HOME CARE VISIT (OUTPATIENT)
Dept: HOME HEALTH SERVICES | Facility: HOME HEALTHCARE | Age: 88
End: 2023-10-24
Payer: MEDICARE

## 2023-10-24 VITALS
OXYGEN SATURATION: 100 % | DIASTOLIC BLOOD PRESSURE: 80 MMHG | TEMPERATURE: 98.2 F | HEART RATE: 68 BPM | SYSTOLIC BLOOD PRESSURE: 159 MMHG | RESPIRATION RATE: 16 BRPM

## 2023-10-24 PROCEDURE — G0156 HHCP-SVS OF AIDE,EA 15 MIN: HCPCS

## 2023-10-26 NOTE — CASE COMMUNICATION
Patient missed a HHAIDE visit from [facility] on 9-4-23.     Reason: N/A.       For your records only.   As per home health protocol, MD must be notified of missed/cancelled visits; therefore the prescribed frequency was not met.

## 2023-10-27 ENCOUNTER — HOME CARE VISIT (OUTPATIENT)
Dept: HOME HEALTH SERVICES | Facility: HOME HEALTHCARE | Age: 88
End: 2023-10-27
Payer: MEDICARE

## 2023-10-31 ENCOUNTER — HOME CARE VISIT (OUTPATIENT)
Dept: HOME HEALTH SERVICES | Facility: HOME HEALTHCARE | Age: 88
End: 2023-10-31
Payer: MEDICARE

## 2023-10-31 VITALS
OXYGEN SATURATION: 98 % | RESPIRATION RATE: 16 BRPM | HEART RATE: 75 BPM | TEMPERATURE: 98.2 F | DIASTOLIC BLOOD PRESSURE: 87 MMHG | SYSTOLIC BLOOD PRESSURE: 144 MMHG

## 2023-10-31 VITALS
TEMPERATURE: 97.8 F | OXYGEN SATURATION: 99 % | HEART RATE: 57 BPM | SYSTOLIC BLOOD PRESSURE: 122 MMHG | DIASTOLIC BLOOD PRESSURE: 80 MMHG

## 2023-10-31 PROCEDURE — G0299 HHS/HOSPICE OF RN EA 15 MIN: HCPCS

## 2023-10-31 PROCEDURE — G0156 HHCP-SVS OF AIDE,EA 15 MIN: HCPCS

## 2023-10-31 NOTE — CASE COMMUNICATION
Pt/Spouse reported pt's foot had some drainage few days ago leaking from bandages! They are concerned and wanted to see if you could come on out today to take a look instead of tomorrow if possible.

## 2023-11-01 ENCOUNTER — HOME CARE VISIT (OUTPATIENT)
Dept: HOME HEALTH SERVICES | Facility: HOME HEALTHCARE | Age: 88
End: 2023-11-01
Payer: MEDICARE

## 2023-11-01 VITALS
TEMPERATURE: 97.1 F | OXYGEN SATURATION: 98 % | RESPIRATION RATE: 16 BRPM | DIASTOLIC BLOOD PRESSURE: 78 MMHG | SYSTOLIC BLOOD PRESSURE: 124 MMHG | HEART RATE: 64 BPM

## 2023-11-01 PROCEDURE — G0495 RN CARE TRAIN/EDU IN HH: HCPCS

## 2023-11-01 NOTE — HOME HEALTH
60 Day Summary    Home Health need continues for: Skilled Nursing    Primary diagnoses/co-morbidities/recent procedures in past 60 days that impact current episode: Open wound to left ankle    Current level of functional ability: Ambulates w/ rollator w/ moderate assistance. PT to evaluate for retraining of ADLs.    Homebound status and living arrangements: pt lives at home w/ wife. Son and daughter in law frequently check in and take to medical appts.    Goals accomplished and/or measurable progress toward unmet goals in past 60 days: SN to perform wound care weekly unless pt has wound care clinic.    Focus of care for next 60 days for each discipline ordered: assessment of wound    Skin integrity/wound status: Saw pt yesterday for wound care. Will see patient next week to assess wound    Estimated date when home care services will end 9 weeks    SDOH changes/barriers (i.e. Caregiver availability, social isolation, environment, income, transportation access, food insecurity etc.)     Need for MSW referral? N    Plan for next visit wound care/assessment/importance of protein in diet education

## 2023-11-01 NOTE — CASE COMMUNICATION
60 Day Summary    Home Health need continues for: Skilled Nursing    Primary diagnoses/co-morbidities/recent procedures in past 60 days that impact current episode: Open wound to left ankle    Current level of functional ability: Ambulates w/ rollator w/ moderate assistance. PT to evaluate for retraining of ADLs.    Homebound status and living arrangements: pt lives at home w/ wife. Son and daughter in law frequently check in and take t o medical appts.    Goals accomplished and/or measurable progress toward unmet goals in past 60 days: SN to perform wound care weekly unless pt has wound care clinic.    Focus of care for next 60 days for each discipline ordered: assessment of wound    Skin integrity/wound status: Saw pt yesterday for wound care. Will see patient next week to assess wound    Estimated date when home care services will end 9 weeks    SDOH changes/barrier s (i.e. Caregiver availability, social isolation, environment, income, transportation access, food insecurity etc.)     Need for MSW referral? N    Plan for next visit wound care/assessment/importance of protein in diet education

## 2023-11-01 NOTE — HOME HEALTH
Wound care and assessment completed. Educated on elevation to prevent edema. Wound care clinic on Wednesday. next visit:wound care/measurements.

## 2023-11-07 ENCOUNTER — HOME CARE VISIT (OUTPATIENT)
Dept: HOME HEALTH SERVICES | Facility: HOME HEALTHCARE | Age: 88
End: 2023-11-07
Payer: MEDICARE

## 2023-11-07 VITALS
HEART RATE: 61 BPM | SYSTOLIC BLOOD PRESSURE: 147 MMHG | DIASTOLIC BLOOD PRESSURE: 78 MMHG | RESPIRATION RATE: 16 BRPM | TEMPERATURE: 98 F | OXYGEN SATURATION: 99 %

## 2023-11-07 PROCEDURE — G0156 HHCP-SVS OF AIDE,EA 15 MIN: HCPCS

## 2023-11-08 ENCOUNTER — HOME CARE VISIT (OUTPATIENT)
Dept: HOME HEALTH SERVICES | Facility: HOME HEALTHCARE | Age: 88
End: 2023-11-08
Payer: MEDICARE

## 2023-11-08 NOTE — CASE COMMUNICATION
SN Missed Visit due to pt having wound care clinic today. Will see pt next week unless wound care clinic sends new orders.

## 2023-11-10 ENCOUNTER — HOME CARE VISIT (OUTPATIENT)
Dept: HOME HEALTH SERVICES | Facility: HOME HEALTHCARE | Age: 88
End: 2023-11-10
Payer: MEDICARE

## 2023-11-10 VITALS
HEART RATE: 91 BPM | TEMPERATURE: 99.6 F | OXYGEN SATURATION: 97 % | SYSTOLIC BLOOD PRESSURE: 126 MMHG | DIASTOLIC BLOOD PRESSURE: 71 MMHG

## 2023-11-10 PROCEDURE — G0299 HHS/HOSPICE OF RN EA 15 MIN: HCPCS

## 2023-11-10 NOTE — HOME HEALTH
Pt fell today while sitting on stool to get dressed. States that his legs just gave out. Pt is extremely weak. Had recent MD appt for respiratory infection. Abx and cough medication added. Educated patient on importance of eating to keep up strength and drinking water. Pt did sustain skin tear to right fifth toe. abx ointment and bandaid applied. Son concerned that wrap on left leg was too tight and that was possible cause for the fall. Wrap assessed and SN is able to stick two fingers easily underneath. Educated that his weakness is likely due to respiratory infection. VSS. Right and Left upper lungs wheezing noted. Next visit: Wound care/assessment/CP assessment.

## 2023-11-10 NOTE — CASE COMMUNICATION
Patient missed a HHPT Eval visit from [facility] on November 10, 2023.     Reason: pt's son cancelled scheduled visit due to pt not feeling like participating.       For your records only.   As per home health protocol, MD must be notified of missed/cancelled visits; therefore the prescribed frequency was not met.

## 2023-11-10 NOTE — CASE COMMUNICATION
Pt fell today while sitting on stool to get dressed. States that his legs just gave out. Pt is extremely weak. Had recent MD appt for respiratory infection. Abx and cough medication added. Educated patient on importance of eating to keep up strength and drinking water. Pt did sustain skin tear to right fifth toe. abx ointment and bandaid applied. Son concerned that wrap on left leg was too tight and that was possible cause for the fall.  Wrap assessed and SN is able to stick two fingers easily underneath. Educated that his weakness is likely due to respiratory infection. VSS. Right and Left upper lungs wheezing noted.

## 2023-11-14 ENCOUNTER — HOME CARE VISIT (OUTPATIENT)
Dept: HOME HEALTH SERVICES | Facility: HOME HEALTHCARE | Age: 88
End: 2023-11-14
Payer: MEDICARE

## 2023-11-14 VITALS
SYSTOLIC BLOOD PRESSURE: 131 MMHG | TEMPERATURE: 97.9 F | RESPIRATION RATE: 16 BRPM | DIASTOLIC BLOOD PRESSURE: 72 MMHG | HEART RATE: 89 BPM | OXYGEN SATURATION: 96 %

## 2023-11-14 PROCEDURE — G0299 HHS/HOSPICE OF RN EA 15 MIN: HCPCS

## 2023-11-15 ENCOUNTER — OFFICE VISIT (OUTPATIENT)
Dept: FAMILY MEDICINE CLINIC | Facility: CLINIC | Age: 88
End: 2023-11-15
Payer: MEDICARE

## 2023-11-15 VITALS
HEART RATE: 85 BPM | HEIGHT: 72 IN | SYSTOLIC BLOOD PRESSURE: 132 MMHG | DIASTOLIC BLOOD PRESSURE: 80 MMHG | OXYGEN SATURATION: 99 % | WEIGHT: 188 LBS | BODY MASS INDEX: 25.47 KG/M2

## 2023-11-15 DIAGNOSIS — J90 PLEURAL EFFUSION ON LEFT: ICD-10-CM

## 2023-11-15 DIAGNOSIS — R05.1 ACUTE COUGH: Primary | ICD-10-CM

## 2023-11-15 PROCEDURE — 99214 OFFICE O/P EST MOD 30 MIN: CPT | Performed by: FAMILY MEDICINE

## 2023-11-15 PROCEDURE — 1160F RVW MEDS BY RX/DR IN RCRD: CPT | Performed by: FAMILY MEDICINE

## 2023-11-15 PROCEDURE — 1159F MED LIST DOCD IN RCRD: CPT | Performed by: FAMILY MEDICINE

## 2023-11-15 RX ORDER — LEVOFLOXACIN 500 MG/1
500 TABLET, FILM COATED ORAL DAILY
Qty: 10 TABLET | Refills: 0 | Status: SHIPPED | OUTPATIENT
Start: 2023-11-15

## 2023-11-15 NOTE — HOME HEALTH
Wound care/assessment completed. Pt started on abx on five days ago. Son doesn't feel as if he's improving and pt only getting weaker. Pt does have productive cough w/ clear thick mucus. Right lung sounds clear. Left lung sounds diminished. Spoke w/ son about getting x ray and son agreed. Made appt w/ Dr. Gannon 11/15 at 2pm. Instructed pt on drinking fluids. Pt verbalized understanding. Next visit: Wound care/assessment/f/u w/ MD appt/pulmonary assessment

## 2023-11-15 NOTE — PROGRESS NOTES
Office Note     Name: Onel Clark    : 3/25/1933     MRN: [id number]     Chief Complaint  Cough (x7days)    Subjective     History of Present Illness:  Onel Clark is a 89 y.o. male who presents today for who went and saw the [facility] in the box.  She put him on cefdinir the 10th had 5 days of cefdinir and feeling pretty good but has a loose sounding cough.  But his son brings him in [place] heard a decreased breath sounds on the left and sure enough is confirmed, blunting of the left pleural space 1+ effusion    Review of Systems:   Review of Systems    Past Medical History:   Past Medical History:   Diagnosis Date    Acquired hypothyroidism     Allergic     Allergies     Arthritis     Benign prostatic hyperplasia with nocturia     Bilateral carotid artery disease     Cancer of the skin, basal cell     Cataract     Chronic non-seasonal allergic rhinitis     DUE TO POLLEN    Chronic renal impairment     COPD (chronic obstructive pulmonary disease)     Decreased hearing of both ears     Degenerative lumbar spinal stenosis     Diabetes mellitus     Elevated PSA     Frequent falls     GERD without esophagitis     High risk medication use     HL (hearing loss)     Hx of bilateral hip replacements     Hx of decompressive lumbar laminectomy     Hx of total knee replacement, bilateral     Hx of transient ischemic attack (TIA)     Hypertension     Mixed hyperlipidemia due to type 2 diabetes mellitus     Nicotine dependence     No natural teeth     FALSE TEETH    Osteoarthritis     Primary hypertension     Primary osteoarthritis involving multiple joints     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 2 diabetes mellitus     Stroke TIA  2 years ago    Thyroid disease     TIA (transient ischemic attack)     Type 2 diabetes mellitus with diabetic polyneuropathy     Type 2 diabetes mellitus with stage 2 chronic kidney disease, without long-term current use of insulin        Past Surgical History:   Past Surgical  History:   Procedure Laterality Date    BACK SURGERY  2007    Lumbar Laminectomy    CARPAL TUNNEL RELEASE  2012    COLONOSCOPY      EYE SURGERY  cataracts    JOINT REPLACEMENT  both hips & knee & back    REPLACEMENT TOTAL KNEE Left 2013    SPINE SURGERY  about 15 yrs ago    TOTAL HIP ARTHROPLASTY Right 2016       Family History:   Family History   Problem Relation Age of Onset    Alzheimer's disease Mother     Diabetes Mother     Hyperlipidemia Mother     Stroke Father     Coronary artery disease Father     Hearing loss Father     Hyperlipidemia Father     Diabetes Sister     Hypertension Sister     Hyperlipidemia Sister     Arthritis Sister     Arthritis Brother     Diabetes Brother     Hearing loss Brother        Social History:   Social History     Socioeconomic History    Marital status:    Tobacco Use    Smoking status: Former     Packs/day: 1.00     Years: 10.00     Additional pack years: 0.00     Total pack years: 10.00     Types: Cigarettes     Passive exposure: Past    Smokeless tobacco: Never    Tobacco comments:     None   Substance and Sexual Activity    Alcohol use: Yes     Alcohol/week: 3.0 standard drinks of alcohol     Types: 3 Glasses of wine per week     Comment: None    Drug use: No    Sexual activity: Not Currently     Partners: Male     Comment: With  (male) until lately - old age problems       Immunizations:   Immunization History   Administered Date(s) Administered    31-influenza Vac Quardvalent Preservativ 10/12/2018    COVID-19 (MODERNA) 1st,2nd,3rd Dose Monovalent 02/25/2021, 03/25/2021    DTaP 5 12/07/2020    Fluzone (or Fluarix & Flulaval for VFC) >6mos 10/11/2016    Influenza, Unspecified 10/08/2020    Pneumococcal, Unspecified 09/12/2012, 11/05/2015    Shingrix 05/20/2023    Tdap 12/07/2020    Zoster, Unspecified 01/14/2021, 06/08/2021        Medications:     Current Outpatient Medications:     amLODIPine (NORVASC) 5 MG tablet, TAKE 1 TABLET BY MOUTH EVERY DAY, Disp: 90  "tablet, Rfl: 1    ASPIRIN 81 PO, Take 81 mg by mouth Daily., Disp: , Rfl:     atorvastatin (LIPITOR) 80 MG tablet, Take 1 tablet by mouth every night at bedtime., Disp: 90 tablet, Rfl: 1    Brompheniramine-Phenylephrine 1-2.5 MG/5ML syrup, Take 5 mL by mouth 4 (Four) Times a Day As Needed., Disp: , Rfl:     cefdinir (OMNICEF) 300 MG capsule, Take 1 capsule by mouth 2 (Two) Times a Day. Indications: Upper Respiratory Tract Infection, Disp: , Rfl:     Cetirizine HCl 10 MG capsule, Take 10 mg by mouth Daily With Breakfast. Indications: Hayfever, Disp: , Rfl:     clopidogrel (PLAVIX) 75 MG tablet, Take 1 tablet by mouth Daily., Disp: 90 tablet, Rfl: 1    furosemide (LASIX) 40 MG tablet, Take 1 tablet by mouth 1 (One) Time Per Week. (Patient taking differently: Take 1 tablet by mouth Daily.), Disp: 30 tablet, Rfl: 2    levothyroxine (SYNTHROID, LEVOTHROID) 137 MCG tablet, Take 1 tablet by mouth Daily., Disp: 90 tablet, Rfl: 1    losartan (COZAAR) 50 MG tablet, Take 1 tablet by mouth Daily., Disp: 90 tablet, Rfl: 1    meloxicam (Mobic) 7.5 MG tablet, Take 1 tablet by mouth Daily., Disp: 30 tablet, Rfl: 5    memantine (NAMENDA) 5 MG tablet, Take 1 tablet by mouth twice daily, Disp: 180 tablet, Rfl: 1    metFORMIN (GLUCOPHAGE) 1000 MG tablet, Take 1 tablet by mouth 2 (Two) Times a Day With Meals., Disp: 180 tablet, Rfl: 1    omeprazole (priLOSEC) 40 MG capsule, Take 1 capsule by mouth Daily., Disp: 90 capsule, Rfl: 1    potassium chloride (K-DUR,KLOR-CON) 20 MEQ CR tablet, Take 1 tablet by mouth 2 (Two) Times a Day. (Patient taking differently: Take 1 tablet by mouth 1 (One) Time Per Week.), Disp: 180 tablet, Rfl: 0    levoFLOXacin (Levaquin) 500 MG tablet, Take 1 tablet by mouth Daily., Disp: 10 tablet, Rfl: 0    Allergies:   No Known Allergies    Objective     Vital Signs  /80   Pulse 85   Ht 182.9 cm (72\")   Wt 85.3 kg (188 lb) Comment: per son  SpO2 99%   BMI 25.50 kg/m²   Estimated body mass index is 25.5 " "kg/m² as calculated from the following:    Height as of this encounter: 182.9 cm (72\").    Weight as of this encounter: 85.3 kg (188 lb).            Physical Exam  Vitals and nursing note reviewed.   Constitutional:       Appearance: Normal appearance. He is obese.   HENT:      Head: Normocephalic.      Right Ear: External ear normal.      Left Ear: External ear normal.      Nose: Nose normal.   Eyes:      Pupils: Pupils are equal, round, and reactive to light.   Pulmonary:          Comments: That's the decreased b.s.  on the left  Cxr confirms this , a 1+ plural effusion. On the left.  Musculoskeletal:      Cervical back: Normal range of motion and neck supple.   Skin:     General: Skin is warm and dry.   Neurological:      Mental Status: He is alert.   Psychiatric:         Mood and Affect: Mood normal.         Behavior: Behavior normal.          Procedures     Assessment and Plan     1. Acute cough  He will likely go to the cefdinir 6 days of helping him.  Got 500 of Levaquin x10 days.  The chest x-ray repeated in a week  - XR Chest PA & Lateral       Follow Up  Return if symptoms worsen or fail to improve, for f/u cxr in one week. see if effusion rising or falling.    [facility]  [address] [address] [phone]  "

## 2023-11-16 ENCOUNTER — HOME CARE VISIT (OUTPATIENT)
Dept: HOME HEALTH SERVICES | Facility: HOME HEALTHCARE | Age: 88
End: 2023-11-16
Payer: MEDICARE

## 2023-11-16 NOTE — CASE COMMUNICATION
Patient missed a HHAIDE  visit from [facility] on 11-16-23.     Reason: Pt cancelled not feel well today!.       For your records only.   As per home health protocol, MD must be notified of missed/cancelled visits; therefore the prescribed frequency was not met.

## 2023-11-21 ENCOUNTER — TELEPHONE (OUTPATIENT)
Dept: FAMILY MEDICINE CLINIC | Facility: CLINIC | Age: 88
End: 2023-11-21
Payer: MEDICARE

## 2023-11-21 ENCOUNTER — HOME CARE VISIT (OUTPATIENT)
Dept: HOME HEALTH SERVICES | Facility: HOME HEALTHCARE | Age: 88
End: 2023-11-21
Payer: MEDICARE

## 2023-11-21 NOTE — CASE COMMUNICATION
Patient missed a HHAIDE visit from [facility] on 11-21-23.     Reason: Pt/Family called and cancelled HHA visit.       For your records only.   As per home health protocol, MD must be notified of missed/cancelled visits; therefore the prescribed frequency was not met.

## 2023-11-21 NOTE — TELEPHONE ENCOUNTER
Leticia with [facility] is requesting a physical therapy evaluation for the week of 11/27/2023.     Can we provide this verbal order please?    Call back number: [phone]

## 2023-11-22 ENCOUNTER — HOME CARE VISIT (OUTPATIENT)
Dept: HOME HEALTH SERVICES | Facility: HOME HEALTHCARE | Age: 88
End: 2023-11-22
Payer: MEDICARE

## 2023-11-22 VITALS
DIASTOLIC BLOOD PRESSURE: 79 MMHG | HEART RATE: 78 BPM | OXYGEN SATURATION: 97 % | RESPIRATION RATE: 16 BRPM | TEMPERATURE: 97.6 F | SYSTOLIC BLOOD PRESSURE: 147 MMHG

## 2023-11-22 PROCEDURE — G0299 HHS/HOSPICE OF RN EA 15 MIN: HCPCS

## 2023-11-22 NOTE — HOME HEALTH
Wound care/assessment completed. Pt will see wound care next wednesday. Pt feeling better. Lungs clear. VSS.  Son reports pt is extremely weak and is in need of PT. Also reports pain in back since fall. Mainly pain in AM when getting out of bed. Suggested OTC lidocaine patches and if not resolving to see MD. Next visit: Wound care/assessment/ f/u w/ wound clinic appt.
132

## 2023-11-28 ENCOUNTER — HOME CARE VISIT (OUTPATIENT)
Dept: HOME HEALTH SERVICES | Facility: HOME HEALTHCARE | Age: 88
End: 2023-11-28
Payer: MEDICARE

## 2023-11-28 ENCOUNTER — TELEPHONE (OUTPATIENT)
Dept: GENERAL RADIOLOGY | Facility: CLINIC | Age: 88
End: 2023-11-28

## 2023-11-28 VITALS
RESPIRATION RATE: 16 BRPM | HEART RATE: 89 BPM | OXYGEN SATURATION: 98 % | DIASTOLIC BLOOD PRESSURE: 74 MMHG | SYSTOLIC BLOOD PRESSURE: 116 MMHG | TEMPERATURE: 97.9 F

## 2023-11-28 DIAGNOSIS — J90 PLEURAL EFFUSION ON LEFT: Primary | ICD-10-CM

## 2023-11-28 PROCEDURE — G0151 HHCP-SERV OF PT,EA 15 MIN: HCPCS

## 2023-11-28 NOTE — TELEPHONE ENCOUNTER
Spoke with son and he would like to do the CT scan like recommended in the xray. He got a letter in the mail stating that they need to follow up on the nodule seen in the chest xray. Can you order CT or should he follow up with dr. Fair?

## 2023-11-29 ENCOUNTER — HOME CARE VISIT (OUTPATIENT)
Dept: HOME HEALTH SERVICES | Facility: HOME HEALTHCARE | Age: 88
End: 2023-11-29
Payer: MEDICARE

## 2023-11-29 PROCEDURE — G0155 HHCP-SVS OF CSW,EA 15 MIN: HCPCS

## 2023-11-29 NOTE — HOME HEALTH
"PT Eval Note    Patient's goal(s): \"to get as strong as I can\"    Services required to achieve goals: PT    Potential Issues for goal attainment: pt with advance neuropathy, pain in B knees    Problems identified: deficits in gait, LE strength, balance and knowledge of HEP    Describe the Functional status and safety: pt requires mod A for transfers and short distance gait in home with 4WW    Describe any environmental issues: none    Any equipment needs: none"

## 2023-11-29 NOTE — HOME HEALTH
Met with patient, wife, and son.  The son, Jose Juan, lives close by and sees his parents daily.  Patient had an appointment today and was released from the wound care clinic.  He states he will continue to work with HH PT and hopes to get stronger.  Patient has a long term care policy and they have hired [facility] to start tomorrow from 9 am - 1 pm and on Sundays from 5:00 pm- 9:00 pm.  Provided resources to them including sitter agencies, community resources, [employer] book, and transportation resources. They have grab bars installed throughout the house and they state they have all DME needed.  They have my number to call if further assistance is needed.  Son states patient was a  and still gets requests for autographs.

## 2023-11-30 ENCOUNTER — HOME CARE VISIT (OUTPATIENT)
Dept: HOME HEALTH SERVICES | Facility: HOME HEALTHCARE | Age: 88
End: 2023-11-30
Payer: MEDICARE

## 2023-11-30 VITALS
TEMPERATURE: 98.4 F | DIASTOLIC BLOOD PRESSURE: 86 MMHG | RESPIRATION RATE: 16 BRPM | HEART RATE: 78 BPM | OXYGEN SATURATION: 100 % | SYSTOLIC BLOOD PRESSURE: 122 MMHG

## 2023-11-30 PROCEDURE — G0156 HHCP-SVS OF AIDE,EA 15 MIN: HCPCS

## 2023-12-01 ENCOUNTER — HOME CARE VISIT (OUTPATIENT)
Dept: HOME HEALTH SERVICES | Facility: HOME HEALTHCARE | Age: 88
End: 2023-12-01
Payer: MEDICARE

## 2023-12-01 VITALS
RESPIRATION RATE: 16 BRPM | HEART RATE: 68 BPM | DIASTOLIC BLOOD PRESSURE: 75 MMHG | TEMPERATURE: 97.5 F | SYSTOLIC BLOOD PRESSURE: 142 MMHG | OXYGEN SATURATION: 92 %

## 2023-12-01 PROCEDURE — G0151 HHCP-SERV OF PT,EA 15 MIN: HCPCS

## 2023-12-02 NOTE — HOME HEALTH
Routine Visit Note:   Skill/education provided: BLE ther ex instruction, transfer training, gait training  Patient/caregiver response: pt pleasant and cooperative with participation, no c/o increased pain or discomfort  Plan for next visit: add standing heel/toe raises and marching if tolerated  Other pertinent info: none

## 2023-12-04 ENCOUNTER — HOME CARE VISIT (OUTPATIENT)
Dept: HOME HEALTH SERVICES | Facility: HOME HEALTHCARE | Age: 88
End: 2023-12-04
Payer: MEDICARE

## 2023-12-04 VITALS
DIASTOLIC BLOOD PRESSURE: 80 MMHG | RESPIRATION RATE: 16 BRPM | HEART RATE: 81 BPM | TEMPERATURE: 97.9 F | OXYGEN SATURATION: 98 % | SYSTOLIC BLOOD PRESSURE: 135 MMHG

## 2023-12-04 PROCEDURE — G0495 RN CARE TRAIN/EDU IN HH: HCPCS

## 2023-12-04 NOTE — HOME HEALTH
Discharge Summary/Summary of Care Provided:   Patient received home health for diagnosis: Wound to left ankle  Current level of functional ability: pt maxA of 1 using rollator  Living arrangements: pt lives in one story home w/ wife. Son is there in AM and PM to get pt in/out of bed. [facility] coming in a few days a week.   Progress towards goals and/or Were all goals met? Yes  If not all goals met, barriers that prevented patient from meeting goals: n/a  SDOH concerns (i.e. Caregiver availability, social isolation, environment, income, transportation access, food insecurity etc.) n/a  Follow-up appointment plans and community resources provided: Pt scheduled for chest CT scan this after noon

## 2023-12-06 ENCOUNTER — HOME CARE VISIT (OUTPATIENT)
Dept: HOME HEALTH SERVICES | Facility: HOME HEALTHCARE | Age: 88
End: 2023-12-06
Payer: MEDICARE

## 2023-12-06 PROCEDURE — G0157 HHC PT ASSISTANT EA 15: HCPCS

## 2023-12-07 ENCOUNTER — HOME CARE VISIT (OUTPATIENT)
Dept: HOME HEALTH SERVICES | Facility: HOME HEALTHCARE | Age: 88
End: 2023-12-07
Payer: MEDICARE

## 2023-12-07 VITALS
SYSTOLIC BLOOD PRESSURE: 111 MMHG | OXYGEN SATURATION: 99 % | TEMPERATURE: 98.4 F | HEART RATE: 86 BPM | DIASTOLIC BLOOD PRESSURE: 68 MMHG | RESPIRATION RATE: 16 BRPM

## 2023-12-07 PROCEDURE — G0156 HHCP-SVS OF AIDE,EA 15 MIN: HCPCS

## 2023-12-08 ENCOUNTER — HOME CARE VISIT (OUTPATIENT)
Dept: HOME HEALTH SERVICES | Facility: HOME HEALTHCARE | Age: 88
End: 2023-12-08
Payer: MEDICARE

## 2023-12-08 VITALS
OXYGEN SATURATION: 95 % | HEART RATE: 71 BPM | DIASTOLIC BLOOD PRESSURE: 64 MMHG | RESPIRATION RATE: 15 BRPM | SYSTOLIC BLOOD PRESSURE: 120 MMHG

## 2023-12-08 PROCEDURE — G0157 HHC PT ASSISTANT EA 15: HCPCS

## 2023-12-08 NOTE — HOME HEALTH
PT Routine visit note    Skill/education provided: gait, bed mobs and TE    Pt. response: pt. is cooperative and agreeable to PT today: pt. demos ability to perform in/out of bed w/ railing at mod A and cues for technique.  Pt. demos ability to sit EOB w/o back support X 10 mins, no reports of pain post tx.    Plan for next visit: continue to progress standing tolerance

## 2023-12-08 NOTE — HOME HEALTH
PT Routine visit note    Skill/education provided: gait training, transfer training, TE    Pt. response: pt. is cooperative and agreeable to PT today: Pt. demos ability to progress TE to include standing TE today w/ UE support    Plan for next visit: Standing TE as appropriate

## 2023-12-11 DIAGNOSIS — R91.1 PULMONARY NODULE 1 CM OR GREATER IN DIAMETER: Primary | ICD-10-CM

## 2023-12-12 ENCOUNTER — TELEPHONE (OUTPATIENT)
Dept: FAMILY MEDICINE CLINIC | Facility: CLINIC | Age: 88
End: 2023-12-12
Payer: MEDICARE

## 2023-12-12 NOTE — TELEPHONE ENCOUNTER
Son needs a call to go over patients recent CT results, he can view them in mychart but needs a call to discuss

## 2023-12-13 ENCOUNTER — TELEPHONE (OUTPATIENT)
Dept: FAMILY MEDICINE CLINIC | Facility: CLINIC | Age: 88
End: 2023-12-13
Payer: MEDICARE

## 2023-12-13 ENCOUNTER — HOME CARE VISIT (OUTPATIENT)
Dept: HOME HEALTH SERVICES | Facility: HOME HEALTHCARE | Age: 88
End: 2023-12-13
Payer: MEDICARE

## 2023-12-13 VITALS
OXYGEN SATURATION: 94 % | RESPIRATION RATE: 14 BRPM | DIASTOLIC BLOOD PRESSURE: 78 MMHG | HEART RATE: 72 BPM | SYSTOLIC BLOOD PRESSURE: 142 MMHG

## 2023-12-13 DIAGNOSIS — R91.1 PULMONARY NODULE 1 CM OR GREATER IN DIAMETER: ICD-10-CM

## 2023-12-13 DIAGNOSIS — J90 PLEURAL EFFUSION ON LEFT: Primary | ICD-10-CM

## 2023-12-13 PROCEDURE — G0157 HHC PT ASSISTANT EA 15: HCPCS

## 2023-12-14 DIAGNOSIS — M48.061 DEGENERATIVE LUMBAR SPINAL STENOSIS: ICD-10-CM

## 2023-12-14 RX ORDER — MELOXICAM 7.5 MG/1
7.5 TABLET ORAL DAILY
Qty: 30 TABLET | Refills: 5 | Status: SHIPPED | OUTPATIENT
Start: 2023-12-14

## 2023-12-14 NOTE — HOME HEALTH
PT Routine visit note    Skill/education provided: gait, transfer training, standing activity/tolerance    Pt. response: pt. is agreeable to PT after some encouragement today: Pt. demos ability to increase standing duration w/ UE support on walker working on pre-gait TA and posture, no increase in pain reported.  Pt. requires cues for set up of transfers, able to return demo on 3/3 attempts w/ mod cues.    Plan for next visit: continue to progress standing TE

## 2023-12-15 ENCOUNTER — HOME CARE VISIT (OUTPATIENT)
Dept: HOME HEALTH SERVICES | Facility: HOME HEALTHCARE | Age: 88
End: 2023-12-15
Payer: MEDICARE

## 2023-12-15 VITALS
RESPIRATION RATE: 16 BRPM | SYSTOLIC BLOOD PRESSURE: 122 MMHG | DIASTOLIC BLOOD PRESSURE: 88 MMHG | HEART RATE: 84 BPM | OXYGEN SATURATION: 99 % | TEMPERATURE: 98 F

## 2023-12-15 PROCEDURE — G0157 HHC PT ASSISTANT EA 15: HCPCS

## 2023-12-15 PROCEDURE — G0156 HHCP-SVS OF AIDE,EA 15 MIN: HCPCS

## 2023-12-17 VITALS — OXYGEN SATURATION: 99 % | HEART RATE: 68 BPM

## 2023-12-18 ENCOUNTER — HOME CARE VISIT (OUTPATIENT)
Dept: HOME HEALTH SERVICES | Facility: HOME HEALTHCARE | Age: 88
End: 2023-12-18
Payer: MEDICARE

## 2023-12-18 VITALS
RESPIRATION RATE: 16 BRPM | DIASTOLIC BLOOD PRESSURE: 82 MMHG | HEART RATE: 78 BPM | TEMPERATURE: 97.5 F | OXYGEN SATURATION: 97 % | SYSTOLIC BLOOD PRESSURE: 136 MMHG

## 2023-12-18 PROCEDURE — G0151 HHCP-SERV OF PT,EA 15 MIN: HCPCS

## 2023-12-18 PROCEDURE — G0159 HHC PT MAINT EA 15 MIN: HCPCS

## 2023-12-18 NOTE — HOME HEALTH
PT Routine visit note    Skill/education provided: gait, standing TE    Pt. response: pt. is cooperative and agreeable to PT today; pt. tolerates increasing ambulation distance today indoors and adding resistance to LE TE, no pain reported    Plan for next visit: Reassessment by PT

## 2023-12-19 NOTE — HOME HEALTH
Physical Therapy Reassessment:     Summary of Care: pt has been seen for 7 skilled HHPT visits including IE for gait training, balance training, therapeutic exercise, pt education and HEP instruction related to DMII    Progress thus far: pt demonstrates improved balance as evidenced by increased score on Tinetti Balance Scale from 7/28 to 10/28, improved LE strength from 3/5 to 3+/5, improved gait from min A with 4WW to current SBA to occasional CGA with 4WW    Patient/CG Participation: pt is consistently cooperative with participation in PT visits, has caregivers that assist with HEP    Remaining Problems: pt demonstrates continued deficits in gait, balance and LE strength and would benefit from continued HHPT 1wk2 to complete current POC for progression toward ongoing goal    Plan of Care Changes: none    Equipment Needs: none identified

## 2023-12-21 ENCOUNTER — TELEPHONE (OUTPATIENT)
Dept: FAMILY MEDICINE CLINIC | Facility: CLINIC | Age: 88
End: 2023-12-21
Payer: MEDICARE

## 2023-12-21 ENCOUNTER — HOME CARE VISIT (OUTPATIENT)
Dept: HOME HEALTH SERVICES | Facility: HOME HEALTHCARE | Age: 88
End: 2023-12-21
Payer: MEDICARE

## 2023-12-21 VITALS
OXYGEN SATURATION: 97 % | TEMPERATURE: 97.1 F | RESPIRATION RATE: 16 BRPM | HEART RATE: 71 BPM | DIASTOLIC BLOOD PRESSURE: 78 MMHG | SYSTOLIC BLOOD PRESSURE: 137 MMHG

## 2023-12-21 PROCEDURE — G0495 RN CARE TRAIN/EDU IN HH: HCPCS

## 2023-12-21 NOTE — HOME HEALTH
SN eval of complaints of redness to left great toe. Toe is red and appears to have start of ingrown toenail. No drainage. Pt does not have fever. Pt is established w/ Dr. Sparks in [place] and has appt Jan 29th. SN called Dr. Sparks's office and moved appt to Jan 4th. pt/son/wife instructed to call Dr. Sparks's office w/ any s/s of infection for abx.

## 2023-12-22 ENCOUNTER — HOME CARE VISIT (OUTPATIENT)
Dept: HOME HEALTH SERVICES | Facility: HOME HEALTHCARE | Age: 88
End: 2023-12-22
Payer: MEDICARE

## 2023-12-22 VITALS
RESPIRATION RATE: 16 BRPM | TEMPERATURE: 98.4 F | HEART RATE: 72 BPM | OXYGEN SATURATION: 100 % | SYSTOLIC BLOOD PRESSURE: 119 MMHG | DIASTOLIC BLOOD PRESSURE: 81 MMHG

## 2023-12-22 PROCEDURE — G0156 HHCP-SVS OF AIDE,EA 15 MIN: HCPCS

## 2023-12-27 ENCOUNTER — HOME CARE VISIT (OUTPATIENT)
Dept: HOME HEALTH SERVICES | Facility: HOME HEALTHCARE | Age: 88
End: 2023-12-27
Payer: MEDICARE

## 2023-12-27 ENCOUNTER — TELEPHONE (OUTPATIENT)
Dept: FAMILY MEDICINE CLINIC | Facility: CLINIC | Age: 88
End: 2023-12-27
Payer: MEDICARE

## 2023-12-27 VITALS — TEMPERATURE: 97.7 F | OXYGEN SATURATION: 99 % | HEART RATE: 67 BPM

## 2023-12-27 VITALS
SYSTOLIC BLOOD PRESSURE: 144 MMHG | DIASTOLIC BLOOD PRESSURE: 72 MMHG | OXYGEN SATURATION: 99 % | RESPIRATION RATE: 14 BRPM | HEART RATE: 69 BPM

## 2023-12-27 PROCEDURE — G0157 HHC PT ASSISTANT EA 15: HCPCS

## 2023-12-27 PROCEDURE — G0495 RN CARE TRAIN/EDU IN HH: HCPCS

## 2023-12-27 NOTE — TELEPHONE ENCOUNTER
Caller: EMILIE WITH [facility]    Relationship: Home Health    Best call back number: [phone]     What orders are you requesting (i.e. lab or imaging): ORDERS FOR LAB DRAW FOR HOME HEALTH     In what timeframe would the patient need to come in: ASAP BEFORE APPOINTMENT NEXT WEEK ON JAN 2    Where will you receive your lab/imaging services: HOME     Additional notes: FAMILY WANTS THE HOME HEALTH TO DO THE LAB DRAW SO IT WOULD BE READY TO DISCUSS WHEN PATIENT COMES IN FOR APPOINTMENT ON JAN 2. OK FOR VERBAL ORDERS AND VM IF NO ANSWER

## 2023-12-27 NOTE — HOME HEALTH
Pulmonary assessment completed. Right lung clear. Left lung dimished. Pt extremely SOB when ambulating. O2 sats 99% during ambulation. Instructed wife/daugher in law on s/s of fluid overload. Suggest pt weigh daily and keep log.  Pt has PCP appt 1/2/24. SN called MD office for lab orders per family request so pt would have them at appt. Awaiting call back. pt to have PET scan w/ results on 1/3/24 and Pulmonary and podiatry on 1/4/24. Next visit: Labs?/Recertification.

## 2023-12-28 NOTE — HOME HEALTH
PT Routine visit note    Skill/education provided: gait, transfer training    Pt. response: pt. is cooperative and agreeable to PT today; pt. requires ongong cues for transfer technique/mechanics, educated wife on proper cues to assist, she v/u.    Plan for next visit: Discharge by PT if appropriate

## 2023-12-29 ENCOUNTER — HOME CARE VISIT (OUTPATIENT)
Dept: HOME HEALTH SERVICES | Facility: HOME HEALTHCARE | Age: 88
End: 2023-12-29
Payer: MEDICARE

## 2023-12-29 VITALS
OXYGEN SATURATION: 99 % | HEART RATE: 75 BPM | RESPIRATION RATE: 16 BRPM | SYSTOLIC BLOOD PRESSURE: 124 MMHG | TEMPERATURE: 98.4 F | DIASTOLIC BLOOD PRESSURE: 73 MMHG

## 2023-12-29 PROCEDURE — G0299 HHS/HOSPICE OF RN EA 15 MIN: HCPCS

## 2023-12-29 PROCEDURE — G0156 HHCP-SVS OF AIDE,EA 15 MIN: HCPCS

## 2023-12-29 NOTE — TELEPHONE ENCOUNTER
HUB TO RELAY--  CBC, CMP, lipid, A1c, TSH   Lab orders for Home Health, Left message for Leticia to call back

## 2023-12-30 VITALS
RESPIRATION RATE: 16 BRPM | SYSTOLIC BLOOD PRESSURE: 142 MMHG | DIASTOLIC BLOOD PRESSURE: 81 MMHG | OXYGEN SATURATION: 100 % | HEART RATE: 72 BPM | TEMPERATURE: 97.7 F

## 2024-01-02 ENCOUNTER — OFFICE VISIT (OUTPATIENT)
Dept: FAMILY MEDICINE CLINIC | Facility: CLINIC | Age: 89
End: 2024-01-02
Payer: MEDICARE

## 2024-01-02 VITALS
SYSTOLIC BLOOD PRESSURE: 142 MMHG | DIASTOLIC BLOOD PRESSURE: 84 MMHG | HEIGHT: 72 IN | OXYGEN SATURATION: 93 % | BODY MASS INDEX: 25.5 KG/M2 | RESPIRATION RATE: 17 BRPM | HEART RATE: 82 BPM

## 2024-01-02 DIAGNOSIS — K74.60 CIRRHOSIS OF LIVER WITH ASCITES, UNSPECIFIED HEPATIC CIRRHOSIS TYPE: ICD-10-CM

## 2024-01-02 DIAGNOSIS — R18.8 OTHER ASCITES: ICD-10-CM

## 2024-01-02 DIAGNOSIS — R18.8 CIRRHOSIS OF LIVER WITH ASCITES, UNSPECIFIED HEPATIC CIRRHOSIS TYPE: ICD-10-CM

## 2024-01-02 DIAGNOSIS — R91.8 PULMONARY MASS: Primary | ICD-10-CM

## 2024-01-02 PROCEDURE — 99214 OFFICE O/P EST MOD 30 MIN: CPT | Performed by: FAMILY MEDICINE

## 2024-01-03 ENCOUNTER — HOSPITAL ENCOUNTER (OUTPATIENT)
Dept: PET IMAGING | Facility: HOSPITAL | Age: 89
Discharge: HOME | End: 2024-01-03
Payer: MEDICARE

## 2024-01-03 ENCOUNTER — CLINICAL SUPPORT (OUTPATIENT)
Dept: FAMILY MEDICINE CLINIC | Facility: CLINIC | Age: 89
End: 2024-01-03
Payer: MEDICARE

## 2024-01-03 ENCOUNTER — HOME CARE VISIT (OUTPATIENT)
Dept: HOME HEALTH SERVICES | Facility: HOME HEALTHCARE | Age: 89
End: 2024-01-03
Payer: MEDICARE

## 2024-01-03 VITALS
OXYGEN SATURATION: 100 % | HEART RATE: 45 BPM | DIASTOLIC BLOOD PRESSURE: 79 MMHG | TEMPERATURE: 97.4 F | RESPIRATION RATE: 16 BRPM | SYSTOLIC BLOOD PRESSURE: 126 MMHG

## 2024-01-03 DIAGNOSIS — N39.0 URINARY TRACT INFECTION WITHOUT HEMATURIA, SITE UNSPECIFIED: Primary | ICD-10-CM

## 2024-01-03 DIAGNOSIS — R91.1 PULMONARY NODULE 1 CM OR GREATER IN DIAMETER: ICD-10-CM

## 2024-01-03 LAB
BILIRUB BLD-MCNC: NEGATIVE MG/DL
CLARITY, POC: ABNORMAL
COLOR UR: ABNORMAL
EXPIRATION DATE: ABNORMAL
GLUCOSE BLDC GLUCOMTR-MCNC: 144 MG/DL (ref 70–130)
GLUCOSE UR STRIP-MCNC: NEGATIVE MG/DL
KETONES UR QL: ABNORMAL
LEUKOCYTE EST, POC: NEGATIVE
Lab: ABNORMAL
NITRITE UR-MCNC: NEGATIVE MG/ML
PH UR: 5.5 [PH] (ref 5–8)
PROT UR STRIP-MCNC: NEGATIVE MG/DL
RBC # UR STRIP: NEGATIVE /UL
SP GR UR: 1.02 (ref 1–1.03)
UROBILINOGEN UR QL: ABNORMAL

## 2024-01-03 PROCEDURE — 0 FLUDEOXYGLUCOSE F18 SOLUTION: Performed by: FAMILY MEDICINE

## 2024-01-03 PROCEDURE — A9552 F18 FDG: HCPCS | Performed by: FAMILY MEDICINE

## 2024-01-03 PROCEDURE — 82948 REAGENT STRIP/BLOOD GLUCOSE: CPT

## 2024-01-03 PROCEDURE — 78815 PET IMAGE W/CT SKULL-THIGH: CPT

## 2024-01-03 PROCEDURE — G0495 RN CARE TRAIN/EDU IN HH: HCPCS

## 2024-01-03 RX ADMIN — FLUDEOXYGLUCOSE F 18 1 DOSE: 200 INJECTION, SOLUTION INTRAVENOUS at 13:07

## 2024-01-03 NOTE — PROGRESS NOTES
Follow Up Office Visit      Date of Visit:  2024   Patient Name: Onel Clark  : 3/25/1933   MRN: [id number]     Chief Complaint:    Chief Complaint   Patient presents with    Fatigue     Patient is scheduled for a PET scan tomorrow. Patient has had a recent CT scan as well as labs drawn by [facility] on Friday. He will be seeing pulmonology on Thursday.     Weakness - Generalized    Shortness of Breath    Leg Swelling    Abdominal Pain       History of Present Illness: Onel Clark is a 89 y.o. male who is here today for follow up.  For some I been able to see patient since seen by Dr. Gannon for evaluation of some cough earlier this past month.  Found to have mass on CT scan and x-ray.  It was recommended to have PET scan done and Dr. Gannon has ordered and this is tomorrow.  CT scan also saw ascites and effusion.  Questionable cirrhotic liver.  No prior history.  No major issues in the past with liver enzymes either.        Subjective      Review of Systems:   Review of Systems   Respiratory:  Positive for shortness of breath and wheezing.    Cardiovascular:  Positive for leg swelling.       Past Medical History:   Past Medical History:   Diagnosis Date    Acquired hypothyroidism     Allergic     Allergies     Arthritis     Benign prostatic hyperplasia with nocturia     Bilateral carotid artery disease     Cancer of the skin, basal cell     Cataract     Chronic non-seasonal allergic rhinitis     DUE TO POLLEN    Chronic renal impairment     COPD (chronic obstructive pulmonary disease)     Decreased hearing of both ears     Degenerative lumbar spinal stenosis     Diabetes mellitus     Elevated PSA     Frequent falls     GERD without esophagitis     High risk medication use     HL (hearing loss)     Hx of bilateral hip replacements     Hx of decompressive lumbar laminectomy     Hx of total knee replacement, bilateral     Hx of transient ischemic attack (TIA)     Hypertension     Mixed  hyperlipidemia due to type 2 diabetes mellitus     Nicotine dependence     No natural teeth     FALSE TEETH    Osteoarthritis     Primary hypertension     Primary osteoarthritis involving multiple joints     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 2 diabetes mellitus     Stroke TIA  2 years ago    Thyroid disease     TIA (transient ischemic attack)     Type 2 diabetes mellitus with diabetic polyneuropathy     Type 2 diabetes mellitus with stage 2 chronic kidney disease, without long-term current use of insulin        Past Surgical History:   Past Surgical History:   Procedure Laterality Date    BACK SURGERY  2007    Lumbar Laminectomy    CARPAL TUNNEL RELEASE  2012    COLONOSCOPY      EYE SURGERY  cataracts    JOINT REPLACEMENT  both hips & knee & back    REPLACEMENT TOTAL KNEE Left 2013    SPINE SURGERY  about 15 yrs ago    TOTAL HIP ARTHROPLASTY Right 2016       Family History:   Family History   Problem Relation Age of Onset    Alzheimer's disease Mother     Diabetes Mother     Hyperlipidemia Mother     Stroke Father     Coronary artery disease Father     Hearing loss Father     Hyperlipidemia Father     Diabetes Sister     Hypertension Sister     Hyperlipidemia Sister     Arthritis Sister     Arthritis Brother     Diabetes Brother     Hearing loss Brother        Social History:   Social History     Socioeconomic History    Marital status:    Tobacco Use    Smoking status: Former     Packs/day: 1.00     Years: 10.00     Additional pack years: 0.00     Total pack years: 10.00     Types: Cigarettes     Passive exposure: Past    Smokeless tobacco: Never    Tobacco comments:     None   Vaping Use    Vaping Use: Never used   Substance and Sexual Activity    Alcohol use: Yes     Alcohol/week: 3.0 standard drinks of alcohol     Types: 3 Glasses of wine per week     Comment: None    Drug use: No    Sexual activity: Not Currently     Partners: Male     Comment: With  (male) until lately - old age problems       Medications:  "    Current Outpatient Medications:     amLODIPine (NORVASC) 5 MG tablet, TAKE 1 TABLET BY MOUTH EVERY DAY, Disp: 90 tablet, Rfl: 1    ASPIRIN 81 PO, Take 81 mg by mouth Daily., Disp: , Rfl:     atorvastatin (LIPITOR) 80 MG tablet, Take 1 tablet by mouth every night at bedtime., Disp: 90 tablet, Rfl: 1    Cetirizine HCl 10 MG capsule, Take 10 mg by mouth Daily With Breakfast. Indications: Hayfever, Disp: , Rfl:     clopidogrel (PLAVIX) 75 MG tablet, Take 1 tablet by mouth Daily., Disp: 90 tablet, Rfl: 1    furosemide (LASIX) 40 MG tablet, Take 1 tablet by mouth 1 (One) Time Per Week. (Patient taking differently: Take 1 tablet by mouth Daily.), Disp: 30 tablet, Rfl: 2    levothyroxine (SYNTHROID, LEVOTHROID) 137 MCG tablet, Take 1 tablet by mouth Daily., Disp: 90 tablet, Rfl: 1    losartan (COZAAR) 50 MG tablet, Take 1 tablet by mouth Daily., Disp: 90 tablet, Rfl: 1    meloxicam (MOBIC) 7.5 MG tablet, TAKE 1 TABLET BY MOUTH EVERY DAY, Disp: 30 tablet, Rfl: 5    memantine (NAMENDA) 5 MG tablet, Take 1 tablet by mouth twice daily, Disp: 180 tablet, Rfl: 1    metFORMIN (GLUCOPHAGE) 1000 MG tablet, Take 1 tablet by mouth 2 (Two) Times a Day With Meals., Disp: 180 tablet, Rfl: 1    omeprazole (priLOSEC) 40 MG capsule, Take 1 capsule by mouth Daily., Disp: 90 capsule, Rfl: 1    potassium chloride (K-DUR,KLOR-CON) 20 MEQ CR tablet, Take 1 tablet by mouth 2 (Two) Times a Day. (Patient taking differently: Take 1 tablet by mouth 1 (One) Time Per Week.), Disp: 180 tablet, Rfl: 0    Allergies:   No Known Allergies    Objective     Physical Exam:  Vital Signs:   Vitals:    01/02/24 1529   BP: 142/84   BP Location: Left arm   Patient Position: Sitting   Cuff Size: Adult   Pulse: 82   Resp: 17   SpO2: 93%   Weight: Comment: Unable to stand and weigh.   Height: 182.9 cm (72\")   PainSc: 0-No pain     Body mass index is 25.5 kg/m².     Physical Exam  Vitals and nursing note reviewed.   Constitutional:       General: He is not in acute " distress.     Appearance: Normal appearance. He is ill-appearing.   HENT:      Head: Normocephalic and atraumatic.      Right Ear: Tympanic membrane and ear canal normal.      Left Ear: Tympanic membrane and ear canal normal.      Nose: Nose normal.   Cardiovascular:      Rate and Rhythm: Normal rate and regular rhythm.      Heart sounds: Normal heart sounds.   Pulmonary:      Effort: Pulmonary effort is normal.      Breath sounds: Normal breath sounds.   Neurological:      Mental Status: He is alert and oriented to person, place, and time. Mental status is at baseline.   Psychiatric:         Mood and Affect: Mood normal.         Procedures      Assessment / Plan      Assessment/Plan:   Diagnoses and all orders for this visit:    1. Pulmonary mass (Primary)    2. Other ascites    3. Cirrhosis of liver with ascites, unspecified hepatic cirrhosis type         Patient has appointment with pulmonology in 2 days and also appointment with PET scan tomorrow.  Will reevaluate after testing.  Will most likely need to see gastroenterology as well.  Unsure if that might be also related to is going on in his lungs with his liver.  Did give different diuretic because of the ascites.  This may help also with his breathing.  Recheck labs probably next week.    Follow Up:   No follow-ups on file.    Ilir Fair  [facility] [place]

## 2024-01-04 ENCOUNTER — OFFICE VISIT (OUTPATIENT)
Age: 89
End: 2024-01-04
Payer: MEDICARE

## 2024-01-04 ENCOUNTER — HOME CARE VISIT (OUTPATIENT)
Dept: HOME HEALTH SERVICES | Facility: HOME HEALTHCARE | Age: 89
End: 2024-01-04
Payer: MEDICARE

## 2024-01-04 ENCOUNTER — TELEPHONE (OUTPATIENT)
Dept: FAMILY MEDICINE CLINIC | Facility: CLINIC | Age: 89
End: 2024-01-04
Payer: MEDICARE

## 2024-01-04 ENCOUNTER — PATIENT OUTREACH (OUTPATIENT)
Dept: ONCOLOGY | Facility: CLINIC | Age: 89
End: 2024-01-04
Payer: MEDICARE

## 2024-01-04 VITALS
SYSTOLIC BLOOD PRESSURE: 140 MMHG | BODY MASS INDEX: 25.47 KG/M2 | HEIGHT: 72 IN | DIASTOLIC BLOOD PRESSURE: 80 MMHG | WEIGHT: 188 LBS | HEART RATE: 57 BPM | OXYGEN SATURATION: 98 %

## 2024-01-04 DIAGNOSIS — J90 CHRONIC BILATERAL PLEURAL EFFUSIONS: ICD-10-CM

## 2024-01-04 DIAGNOSIS — K74.60 CIRRHOSIS OF LIVER WITH ASCITES, UNSPECIFIED HEPATIC CIRRHOSIS TYPE: ICD-10-CM

## 2024-01-04 DIAGNOSIS — R91.1 LUNG NODULE: Primary | ICD-10-CM

## 2024-01-04 DIAGNOSIS — R18.8 CIRRHOSIS OF LIVER WITH ASCITES, UNSPECIFIED HEPATIC CIRRHOSIS TYPE: ICD-10-CM

## 2024-01-04 RX ORDER — BUMETANIDE 1 MG/1
1 TABLET ORAL DAILY
COMMUNITY
End: 2024-01-04 | Stop reason: SDUPTHER

## 2024-01-04 RX ORDER — BUMETANIDE 1 MG/1
1 TABLET ORAL DAILY
Qty: 30 TABLET | Refills: 1 | Status: SHIPPED | OUTPATIENT
Start: 2024-01-04

## 2024-01-04 NOTE — TELEPHONE ENCOUNTER
Spoke with Dr. Fair.  He instructed to send in bumex 1mg 30tabs 1 refill. He is to discontinue lasix. Spoke with avery and she understood

## 2024-01-04 NOTE — TELEPHONE ENCOUNTER
Caller: J LUIS DOMINGO     Relationship: [facility]     Best call back number: [phone]     What is your medical concern? LASIX IS NOT WORKING.    Is your provider already aware of this issue? YES    Have you been treated for this issue? YES. NEW SCRIPT WAS THE SAME THING AND THAT WAS NOT WORKING. DR SYED WAS SUPPOSED TO PRESCRIBE SOMETHING DIFFERENT.        [facility] [place], [place] - [address] [phone] [facility] [phone]  [phone]

## 2024-01-04 NOTE — PROGRESS NOTES
New Patient Pulmonary Office Visit      Patient Name: Onel Clark    Referring Physician: Ilir Fair MD    Chief Complaint: Lung nodule      History of Present Illness: Onel Clark is a 89 y.o. male who is here today to establish care with Pulmonary.  Patient has a past medical history significant for hyperlipidemia, cirrhosis, hypertension, coronary artery disease, hypothyroidism, and diabetes mellitus type 2.  Who was referred to pulmonary secondary to pleural effusions and a pulmonary nodule.  Patient was seen by his home health nurse.  Has had weakness for extended period of time getting weaker in his legs.  They thought that they heard something on the physical exam with him having diminished lung sounds, this led to a chest x-ray that showed pleural effusion and then a CT scan that showed bilateral pleural effusions and some consolidation in the left lower lobe.  Patient did smoke but quit back in the 1980s.  Denies any current chest pain, nausea, fever, or chills.  Does get short of breath when he exerts himself.  Although is very limited at this point currently in a wheelchair.  Was recently diagnosed with cirrhosis and is now on diuretics to help manage his fluid.  Denies frequent coughing outside of baseline, also denies any hemoptysis.    Review of Systems:   Review of Systems   Constitutional:  Positive for activity change and fatigue. Negative for appetite change, chills and diaphoresis.   HENT:  Negative for congestion, postnasal drip, sinus pressure and voice change.    Eyes:  Negative for blurred vision.   Respiratory:  Positive for cough and shortness of breath. Negative for wheezing.    Cardiovascular:  Positive for leg swelling. Negative for chest pain.   Gastrointestinal:  Positive for abdominal distention. Negative for abdominal pain.   Musculoskeletal:  Negative for myalgias.   Skin:  Negative for color change and dry skin.   Allergic/Immunologic: Negative for environmental  allergies.   Neurological:  Negative for weakness and confusion.   Hematological:  Negative for adenopathy.   Psychiatric/Behavioral:  Negative for sleep disturbance and depressed mood.        Past Medical History:   Past Medical History:   Diagnosis Date    Acquired hypothyroidism     Allergic     Allergies     Arthritis     Benign prostatic hyperplasia with nocturia     Bilateral carotid artery disease     Cancer of the skin, basal cell     Cataract     Chronic non-seasonal allergic rhinitis     DUE TO POLLEN    Chronic renal impairment     COPD (chronic obstructive pulmonary disease)     Decreased hearing of both ears     Degenerative lumbar spinal stenosis     Diabetes mellitus     Elevated PSA     Frequent falls     GERD without esophagitis     High risk medication use     HL (hearing loss)     Hx of bilateral hip replacements     Hx of decompressive lumbar laminectomy     Hx of total knee replacement, bilateral     Hx of transient ischemic attack (TIA)     Hypertension     Mixed hyperlipidemia due to type 2 diabetes mellitus     Nicotine dependence     No natural teeth     FALSE TEETH    Osteoarthritis     Primary hypertension     Primary osteoarthritis involving multiple joints     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 2 diabetes mellitus     Stroke TIA  2 years ago    Thyroid disease     TIA (transient ischemic attack)     Type 2 diabetes mellitus with diabetic polyneuropathy     Type 2 diabetes mellitus with stage 2 chronic kidney disease, without long-term current use of insulin        Past Surgical History:   Past Surgical History:   Procedure Laterality Date    BACK SURGERY  2007    Lumbar Laminectomy    CARPAL TUNNEL RELEASE  2012    COLONOSCOPY      EYE SURGERY  cataracts    JOINT REPLACEMENT  both hips & knee & back    REPLACEMENT TOTAL KNEE Left 2013    SPINE SURGERY  about 15 yrs ago    TOTAL HIP ARTHROPLASTY Right 2016       Family History:   Family History   Problem Relation Age of Onset    Alzheimer's disease  Mother     Diabetes Mother     Hyperlipidemia Mother     Stroke Father     Coronary artery disease Father     Hearing loss Father     Hyperlipidemia Father     Diabetes Sister     Hypertension Sister     Hyperlipidemia Sister     Arthritis Sister     Arthritis Brother     Diabetes Brother     Hearing loss Brother        Social History:   Social History     Socioeconomic History    Marital status:    Tobacco Use    Smoking status: Former     Packs/day: 1.00     Years: 10.00     Additional pack years: 0.00     Total pack years: 10.00     Types: Cigarettes     Passive exposure: Past    Smokeless tobacco: Never    Tobacco comments:     None   Vaping Use    Vaping Use: Never used   Substance and Sexual Activity    Alcohol use: Yes     Alcohol/week: 3.0 standard drinks of alcohol     Types: 3 Glasses of wine per week     Comment: None    Drug use: No    Sexual activity: Not Currently     Partners: Male     Comment: With  (male) until lately - old age problems       Medications:     Current Outpatient Medications:     amLODIPine (NORVASC) 5 MG tablet, TAKE 1 TABLET BY MOUTH EVERY DAY, Disp: 90 tablet, Rfl: 1    ASPIRIN 81 PO, Take 81 mg by mouth Daily., Disp: , Rfl:     atorvastatin (LIPITOR) 80 MG tablet, Take 1 tablet by mouth every night at bedtime., Disp: 90 tablet, Rfl: 1    Cetirizine HCl 10 MG capsule, Take 10 mg by mouth Daily With Breakfast. Indications: Hayfever, Disp: , Rfl:     clopidogrel (PLAVIX) 75 MG tablet, Take 1 tablet by mouth Daily., Disp: 90 tablet, Rfl: 1    levothyroxine (SYNTHROID, LEVOTHROID) 137 MCG tablet, Take 1 tablet by mouth Daily., Disp: 90 tablet, Rfl: 1    losartan (COZAAR) 50 MG tablet, Take 1 tablet by mouth Daily., Disp: 90 tablet, Rfl: 1    meloxicam (MOBIC) 7.5 MG tablet, TAKE 1 TABLET BY MOUTH EVERY DAY, Disp: 30 tablet, Rfl: 5    memantine (NAMENDA) 5 MG tablet, Take 1 tablet by mouth twice daily, Disp: 180 tablet, Rfl: 1    metFORMIN (GLUCOPHAGE) 1000 MG tablet, Take  "1 tablet by mouth 2 (Two) Times a Day With Meals., Disp: 180 tablet, Rfl: 1    omeprazole (priLOSEC) 40 MG capsule, Take 1 capsule by mouth Daily., Disp: 90 capsule, Rfl: 1    furosemide (LASIX) 40 MG tablet, Take 1 tablet by mouth 1 (One) Time Per Week. (Patient not taking: Reported on 1/4/2024), Disp: 30 tablet, Rfl: 2    potassium chloride (K-DUR,KLOR-CON) 20 MEQ CR tablet, Take 1 tablet by mouth 2 (Two) Times a Day. (Patient not taking: Reported on 1/4/2024), Disp: 180 tablet, Rfl: 0  No current facility-administered medications for this visit.    Allergies:   No Known Allergies    Physical Exam:  Vital Signs:   Vitals:    01/04/24 1114   BP: 140/80   BP Location: Right arm   Patient Position: Sitting   Cuff Size: Adult   Pulse: 57   SpO2: 98%   Weight: 85.3 kg (188 lb)   Height: 182.9 cm (72\")       Physical Exam  Vitals and nursing note reviewed.   Constitutional:       General: He is not in acute distress.     Appearance: He is well-developed and normal weight. He is ill-appearing. He is not toxic-appearing.   HENT:      Head: Normocephalic and atraumatic.      Nose: Nose normal.      Mouth/Throat:      Pharynx: No oropharyngeal exudate.   Eyes:      Conjunctiva/sclera: Conjunctivae normal.      Pupils: Pupils are equal, round, and reactive to light.   Cardiovascular:      Rate and Rhythm: Normal rate and regular rhythm.      Pulses: Normal pulses.      Heart sounds: Normal heart sounds. No murmur heard.     No gallop.   Pulmonary:      Effort: Pulmonary effort is normal. No respiratory distress.      Breath sounds: No wheezing, rhonchi or rales.      Comments: Diminished breath sounds  Abdominal:      General: There is distension.      Palpations: Abdomen is soft.      Tenderness: There is no abdominal tenderness.   Musculoskeletal:         General: Normal range of motion.      Cervical back: Normal range of motion and neck supple.      Right lower leg: Edema present.      Left lower leg: Edema present. "   Neurological:      Mental Status: He is alert and oriented to person, place, and time.   Psychiatric:         Behavior: Behavior normal.         Immunization History   Administered Date(s) Administered    31-influenza Vac Quardvalent Preservativ 10/12/2018    COVID-19 (MODERNA) 1st,2nd,3rd Dose Monovalent 02/25/2021, 03/25/2021    DTaP 5 12/07/2020    Fluzone (or Fluarix & Flulaval for VFC) >6mos 10/11/2016    Influenza, Unspecified 10/08/2020    Pneumococcal, Unspecified 09/12/2012, 11/05/2015    Shingrix 05/20/2023    Tdap 12/07/2020    Zoster, Unspecified 01/14/2021, 06/08/2021       Results Review:   -I personally reviewed the patient's PET scan from 1/4/2024, at least to my read there is no signs of PET positivity in the area of consolidation in the left lower lobe, bilateral pleural effusions are present.  - I personally reviewed the pts imaging from CT scan of the chest from 12/4/2023 shows bilateral pleural effusions, with consolidative area in the left lower lobe 2.3 x 4.9 cm more suggestive of atelectasis.  Radiology also does mention that the patient has a cirrhotic liver with moderate ascites in the abdomen abdomen.  CMP noted for sodium level of 133, with normal AST and ALT.  Otherwise unremarkable.  - I personally reviewed the pts labs which was significant for elevated white blood cell count of 11,000, hemoglobin of 10.8,  - I personally reviewed the pts chart with regards to evaluation by the patient's PCP    Assessment / Plan:   Diagnoses and all orders for this visit:    1. Lung nodule (Primary)  -Regards to the left lower lobe lung nodule.  I think this is an area of atelectasis, could be chronic infection.  I did review the CT scan and PET scan with the patient and his family.  At least to my evaluation appears to most be a low level of PET positivity.  But I will need to wait on radiology's final reading for confirmation.  We discussed that if this is a cancer there are multiple options for  how fast it could grow but given his current medical state and comorbidities treatment could be worse on him than the malignancy itself.  He is already had a very advanced age and has limited life left.  For now we will plan to continue to follow it with a repeat CT scan in 3 months.  I do not see any PET positive activity outside of the chest.  The family may decide not to pursue any further CT scans which I also think is very reasonable.  Unfortunately biopsies will be difficult for this because to do a navigational bronchoscopy he would have to put to sleep which I would not let Laurence recommend, and then do a CT-guided needle biopsy as long as he has significant mount of fluid and to make the biopsy is very difficult and unreliable.  The patient and his family both verbalized understanding of this and they are going to have more discussions prior to the next visit.    2. Chronic bilateral pleural effusions  3. Cirrhosis of liver with ascites, unspecified hepatic cirrhosis type  -As noted above the effusions will complicate the biopsy process.  For now would recommend continuing his diuretics working with his PCP to help get his fluid under better control hopefully by that point in time if we repeat the neck CT scan we will have more options for biopsy if we need to.      Follow Up:   Return in about 3 months (around 4/4/2024) for CT Chest with next visit.     MANUEL Jarquin, DO  Pulmonary and Critical Care Medicine  Note Electronically Signed    Part of this note may be an electronic transcription/translation of spoken language to printed text using the Dragon Dictation System.

## 2024-01-05 ENCOUNTER — TELEPHONE (OUTPATIENT)
Dept: FAMILY MEDICINE CLINIC | Facility: CLINIC | Age: 89
End: 2024-01-05
Payer: MEDICARE

## 2024-01-05 LAB
BACTERIA UR CULT: NORMAL
BACTERIA UR CULT: NORMAL

## 2024-01-05 NOTE — TELEPHONE ENCOUNTER
Caller: MARYLIN DOMINGO    Relationship: Emergency Contact    Best call back number: [phone]     What is the best time to reach you: ANYTIME    Who are you requesting to speak with (clinical staff, provider,  specific staff member): CLINICAL STAFF    Do you know the name of the person who called: WILTON, WITH [facility]    What was the call regarding: THE PATIENT'S SON, NGUYEN, IS CONCERNED WITH THE RESULTS OF A URINE TEST, AS HE BELIEVES THAT THE PATIENT MAY HAVE A UTI    NGUYEN WOULD LIKE TO RECEIVE A CALLBACK TO DISCUSS THIS AS SOON AS POSSIBLE    PLEASE ADVISE

## 2024-01-05 NOTE — CASE COMMUNICATION
60 Day Summary  Home Health need continues for: SN/HHA  Primary diagnoses/co-morbidities/recent procedures in past 60 days that impact current episode: Pt diagnoses of Pleural Effusions, acsites, lung nodules.   Current level of functional ability: pt maxA x 1  Homebound status and living arrangements: Pt lives at home w/ spouse. Son takes to medical appts. pt has visiting mirlande M-SHIN  Goals accomplished and/or measurable progress toward  unmet goals in past 60 days:   Focus of care for next 60 days for each discipline ordered: pulmonary assessments/medication education due to changes in diuretics   Skin integrity/wound status: Javier = 14  Estimated date when home care services will end 60 days  SDOH changes/barriers (i.e. Caregiver availability, social isolation, environment, income, transportation access, food insecurity etc.)/na  Need for MSW referral? No  Plan for  next visit Pulmonary assessment/medication education

## 2024-01-05 NOTE — CASE COMMUNICATION
HHPT Discharge Summary/Summary of Care Provided: pt has been seen for 8 skilled HHPT visits since SOC  Patient received home health for diagnosis: DM II  Current level of functional ability: pt requires min/mod A for mobility in home  Living arrangements: lives with spouse, son lives close by and assists daily  Progress towards goals and/or Were all goals met? no  If not all goals met, barriers that prevented patient from meeting goals:  pt with continued fatigue and weakness  SDOH concerns (i.e. Caregiver availability, social isolation, environment, income, transportation access, food insecurity etc.): n/a  Follow-up appointment plans and community resources provided: Dr Fair on 1/8/24  Other imporant information. SN remains in home

## 2024-01-07 ENCOUNTER — PATIENT MESSAGE (OUTPATIENT)
Dept: FAMILY MEDICINE CLINIC | Facility: CLINIC | Age: 89
End: 2024-01-07
Payer: MEDICARE

## 2024-01-08 ENCOUNTER — TELEMEDICINE (OUTPATIENT)
Dept: FAMILY MEDICINE CLINIC | Facility: CLINIC | Age: 89
End: 2024-01-08
Payer: MEDICARE

## 2024-01-08 DIAGNOSIS — K74.60 CIRRHOSIS OF LIVER WITH ASCITES, UNSPECIFIED HEPATIC CIRRHOSIS TYPE: Primary | ICD-10-CM

## 2024-01-08 DIAGNOSIS — R91.8 PULMONARY MASS: ICD-10-CM

## 2024-01-08 DIAGNOSIS — Z79.899 HIGH RISK MEDICATION USE: ICD-10-CM

## 2024-01-08 DIAGNOSIS — R18.8 CIRRHOSIS OF LIVER WITH ASCITES, UNSPECIFIED HEPATIC CIRRHOSIS TYPE: Primary | ICD-10-CM

## 2024-01-08 PROCEDURE — 99214 OFFICE O/P EST MOD 30 MIN: CPT | Performed by: FAMILY MEDICINE

## 2024-01-08 NOTE — PROGRESS NOTES
Mode of Visit: Video  Location of patient: home  Location of provider: Office  You have chosen to receive care through a telehealth visit.  The patient has signed the video visit consent form.  The visit included audio and video interaction. No technical issues occurred during this visit.     Chief Complaint  No chief complaint on file.      Onel Clark presents to [facility]    Patient seen today in follow-up of his pulmonary mass as well as his findings on CT scan of possible cirrhosis.  He has seen the pulmonologist and they have reviewed his PET scan and did not feel that this represents a malignant process.  Most likely this was atelectasis.  CT scans do also show when they do the PET scan that there is probable cirrhosis and he does have ascites.  His symptoms of shortness of breath are most likely related.  We did alter his diuretics at our last visit and there has been some change in swelling in his lower extremities but still with abdominal swelling.  Still with some shortness of breath.  Not overall majorly severe.  Some weakness due to all of this and his age.      Review of Systems   Constitutional:  Negative for fatigue and fever.   HENT:  Negative for congestion and ear pain.    Respiratory:  Negative for apnea, cough, chest tightness and shortness of breath.    Cardiovascular:  Negative for chest pain.   Gastrointestinal:  Negative for abdominal pain, constipation, diarrhea and nausea.   Musculoskeletal:  Negative for arthralgias.   Psychiatric/Behavioral:  Negative for depressed mood and stress.        Objective   Vital Signs:   There were no vitals taken for this visit.    Physical Exam   Constitutional: He appears well-developed.   Neurological: He is alert.   Psychiatric: He has a normal mood and affect.                    Assessment and Plan    Diagnoses and all orders for this visit:    1. Cirrhosis of liver with ascites, unspecified hepatic cirrhosis type  (Primary)  -     Ambulatory Referral to Gastroenterology    2. Pulmonary mass        Will make referral to gastroenterology for further evaluation and possible management with the findings on CT scan.  Overall pulmonary findings are reassuring there.  Continue current diuretics.  Check renal function due to the diuretic changes.    Follow Up   No follow-ups on file.  Patient was given instructions and counseling regarding his condition or for health maintenance advice. Please see specific information pulled into the AVS if appropriate.

## 2024-01-10 ENCOUNTER — HOME CARE VISIT (OUTPATIENT)
Dept: HOME HEALTH SERVICES | Facility: HOME HEALTHCARE | Age: 89
End: 2024-01-10
Payer: MEDICARE

## 2024-01-10 VITALS
TEMPERATURE: 97.9 F | SYSTOLIC BLOOD PRESSURE: 127 MMHG | DIASTOLIC BLOOD PRESSURE: 76 MMHG | HEART RATE: 72 BPM | RESPIRATION RATE: 16 BRPM | OXYGEN SATURATION: 99 %

## 2024-01-10 PROCEDURE — G0299 HHS/HOSPICE OF RN EA 15 MIN: HCPCS

## 2024-01-10 NOTE — HOME HEALTH
Pulmonary assessment completed. Left lung diminished. Right lung clear. Instructed on deep breathing exercise. Son to get incentive spirometer. Results from PET scan last week concluded liver issues. Awaiting referal to [facility] from PCP. PCP stopped Lasix and added Bumex. Wife/Son do not seem to think it's making a difference. Pt extremely weak. SN and son helped pt stand on scale. pt weighs 182lbs. Educated on daily weights in AM to monitor fluid gain/loss. Labs drawn and taken to [facility] w/ results to Dr. Fair. Next visit: Pulmonary assessment/medication education/daily weight log

## 2024-01-11 ENCOUNTER — HOME CARE VISIT (OUTPATIENT)
Dept: HOME HEALTH SERVICES | Facility: HOME HEALTHCARE | Age: 89
End: 2024-01-11
Payer: MEDICARE

## 2024-01-11 VITALS
DIASTOLIC BLOOD PRESSURE: 86 MMHG | SYSTOLIC BLOOD PRESSURE: 146 MMHG | TEMPERATURE: 98.4 F | HEART RATE: 64 BPM | RESPIRATION RATE: 16 BRPM | OXYGEN SATURATION: 100 %

## 2024-01-11 DIAGNOSIS — G45.9 TIA (TRANSIENT ISCHEMIC ATTACK): ICD-10-CM

## 2024-01-11 DIAGNOSIS — K21.9 GASTROESOPHAGEAL REFLUX DISEASE WITHOUT ESOPHAGITIS: ICD-10-CM

## 2024-01-11 DIAGNOSIS — I10 ESSENTIAL (PRIMARY) HYPERTENSION: ICD-10-CM

## 2024-01-11 PROCEDURE — G0156 HHCP-SVS OF AIDE,EA 15 MIN: HCPCS

## 2024-01-11 RX ORDER — OMEPRAZOLE 40 MG/1
40 CAPSULE, DELAYED RELEASE ORAL DAILY
Qty: 90 CAPSULE | Refills: 0 | Status: SHIPPED | OUTPATIENT
Start: 2024-01-11

## 2024-01-11 RX ORDER — ATORVASTATIN CALCIUM 80 MG/1
80 TABLET, FILM COATED ORAL
Qty: 90 TABLET | Refills: 0 | Status: SHIPPED | OUTPATIENT
Start: 2024-01-11

## 2024-01-11 NOTE — CASE COMMUNICATION
Home Health Aide w/ patient today---sent pictures of hands and coccyx area. Concerns of swelling/fluid retention. I saw pt yesterday and he did not have the swelling in the hands and wife/son stated they didn't feel as if he was getting much fluid off w/ the Bumex. Left lung was diminshed. right clear. VSS. Thanks

## 2024-01-15 ENCOUNTER — TELEMEDICINE (OUTPATIENT)
Dept: FAMILY MEDICINE CLINIC | Facility: CLINIC | Age: 89
End: 2024-01-15
Payer: MEDICARE

## 2024-01-15 DIAGNOSIS — R18.8 CIRRHOSIS OF LIVER WITH ASCITES, UNSPECIFIED HEPATIC CIRRHOSIS TYPE: Primary | ICD-10-CM

## 2024-01-15 DIAGNOSIS — K74.60 CIRRHOSIS OF LIVER WITH ASCITES, UNSPECIFIED HEPATIC CIRRHOSIS TYPE: Primary | ICD-10-CM

## 2024-01-15 PROCEDURE — 99214 OFFICE O/P EST MOD 30 MIN: CPT | Performed by: FAMILY MEDICINE

## 2024-01-15 RX ORDER — SPIRONOLACTONE 25 MG/1
25 TABLET ORAL DAILY
Qty: 30 TABLET | Refills: 2 | Status: SHIPPED | OUTPATIENT
Start: 2024-01-15

## 2024-01-15 RX ORDER — CEPHALEXIN 500 MG/1
500 CAPSULE ORAL 3 TIMES DAILY
Qty: 30 CAPSULE | Refills: 0 | Status: SHIPPED | OUTPATIENT
Start: 2024-01-15

## 2024-01-15 NOTE — PROGRESS NOTES
Mode of Visit: Video  Location of patient: home  Location of provider: Office  You have chosen to receive care through a telehealth visit.  The patient has signed the video visit consent form.  The visit included audio and video interaction. No technical issues occurred during this visit.     Chief Complaint  No chief complaint on file.      Onel Clark presents to [facility]    Patient seen to review labs related to increased diuretic usage.  Patient with some ascites and generalized edema.  Switch diuretic to Bumex.  Not a substantial difference in his edema.  Some increase in creatinine.  Has not heard from gastroenterology yet regarding appointment.      Review of Systems   Constitutional:  Negative for fatigue and fever.   HENT:  Negative for congestion and ear pain.    Respiratory:  Negative for apnea, cough, chest tightness and shortness of breath.    Cardiovascular:  Positive for leg swelling. Negative for chest pain.   Gastrointestinal:  Positive for abdominal distention. Negative for abdominal pain, constipation, diarrhea and nausea.   Musculoskeletal:  Negative for arthralgias.   Psychiatric/Behavioral:  Negative for depressed mood and stress.        Objective   Vital Signs:   There were no vitals taken for this visit.    Physical Exam   Constitutional: He appears well-developed and well-nourished.   Psychiatric: He has a normal mood and affect.                    Assessment and Plan    Diagnoses and all orders for this visit:    1. Cirrhosis of liver with ascites, unspecified hepatic cirrhosis type (Primary)    Other orders  -     spironolactone (Aldactone) 25 MG tablet; Take 1 tablet by mouth Daily.  Dispense: 30 tablet; Refill: 2  -     cephalexin (Keflex) 500 MG capsule; Take 1 capsule by mouth 3 (Three) Times a Day.  Dispense: 30 capsule; Refill: 0        Add spironolactone.  Patient has not heard from gastroenterology yet.  Will try to figure out what  appointment status is.  Does also have ingrown toenail with some redness noted.  Gave Keflex.    Follow Up   No follow-ups on file.  Patient was given instructions and counseling regarding his condition or for health maintenance advice. Please see specific information pulled into the AVS if appropriate.

## 2024-01-16 ENCOUNTER — HOME CARE VISIT (OUTPATIENT)
Dept: HOME HEALTH SERVICES | Facility: HOME HEALTHCARE | Age: 89
End: 2024-01-16
Payer: MEDICARE

## 2024-01-16 PROCEDURE — G0495 RN CARE TRAIN/EDU IN HH: HCPCS

## 2024-01-17 VITALS
OXYGEN SATURATION: 98 % | DIASTOLIC BLOOD PRESSURE: 78 MMHG | TEMPERATURE: 98.2 F | SYSTOLIC BLOOD PRESSURE: 134 MMHG | HEART RATE: 64 BPM | RESPIRATION RATE: 16 BRPM

## 2024-01-17 NOTE — HOME HEALTH
Routine Visit Note:    Skill/education provided: Pulmonary assessment completed. Right lung clear and left lung dimished. Instructed on elevation of legs to help promote comfort/prevent edema.     Patient/caregiver response: pt/family verbalized understanding    Plan for next visit: pulmonary assesssment/followup w/ gastro appt on 1.18.24    Other pertinent info: pt continues to have fluid build up in abdomen. BLE edematous. Started on second diuretic yesterday after video appt w/ PCP. Pt started on abx as well for ingrown right big toe. Unable to get to podiatrist currently.

## 2024-01-18 ENCOUNTER — OFFICE VISIT (OUTPATIENT)
Dept: GASTROENTEROLOGY | Facility: CLINIC | Age: 89
End: 2024-01-18
Payer: MEDICARE

## 2024-01-18 ENCOUNTER — HOME CARE VISIT (OUTPATIENT)
Dept: HOME HEALTH SERVICES | Facility: HOME HEALTHCARE | Age: 89
End: 2024-01-18
Payer: MEDICARE

## 2024-01-18 ENCOUNTER — LAB (OUTPATIENT)
Dept: FAMILY MEDICINE CLINIC | Facility: CLINIC | Age: 89
End: 2024-01-18
Payer: MEDICARE

## 2024-01-18 VITALS
TEMPERATURE: 98.1 F | SYSTOLIC BLOOD PRESSURE: 128 MMHG | OXYGEN SATURATION: 100 % | RESPIRATION RATE: 16 BRPM | DIASTOLIC BLOOD PRESSURE: 82 MMHG | HEART RATE: 62 BPM

## 2024-01-18 VITALS
OXYGEN SATURATION: 98 % | HEART RATE: 70 BPM | SYSTOLIC BLOOD PRESSURE: 132 MMHG | DIASTOLIC BLOOD PRESSURE: 76 MMHG | BODY MASS INDEX: 25.47 KG/M2 | HEIGHT: 72 IN | WEIGHT: 188 LBS

## 2024-01-18 DIAGNOSIS — R60.0 EXTREMITY EDEMA: ICD-10-CM

## 2024-01-18 DIAGNOSIS — K75.81 LIVER CIRRHOSIS SECONDARY TO NASH: Primary | ICD-10-CM

## 2024-01-18 DIAGNOSIS — I11.0 HYPERTENSIVE HEART DISEASE WITH HEART FAILURE: ICD-10-CM

## 2024-01-18 DIAGNOSIS — K74.60 LIVER CIRRHOSIS SECONDARY TO NASH: Primary | ICD-10-CM

## 2024-01-18 DIAGNOSIS — J90 PLEURAL EFFUSION: ICD-10-CM

## 2024-01-18 DIAGNOSIS — R18.8 OTHER ASCITES: ICD-10-CM

## 2024-01-18 PROCEDURE — G0156 HHCP-SVS OF AIDE,EA 15 MIN: HCPCS

## 2024-01-18 RX ORDER — ALBUMIN (HUMAN) 12.5 G/50ML
12.5 SOLUTION INTRAVENOUS ONCE
Status: CANCELLED | OUTPATIENT
Start: 2024-01-18 | End: 2024-01-18

## 2024-01-18 RX ORDER — FUROSEMIDE 40 MG/1
TABLET ORAL
COMMUNITY
Start: 2024-01-04 | End: 2024-01-19

## 2024-01-18 NOTE — PROGRESS NOTES
New Patient Consultation     Patient Name: Onel Clark  : 3/25/1933   MRN: [id number]     Chief Complaint:    Chief Complaint   Patient presents with    Cirrhosis       History of Present Illness: Onel Clark is a 89 y.o. male, PMH includes COPD, thyroid disease, T2DM, BPH, hearing loss, GERD, HTN, HL, OA, TIA, who is here today for a Gastroenterology Consultation for cirrhosis. Wife, son and caregiver are with patient for visit today.     History is provided by family members, as pt is extremely [place]. They report pt had a nonspecific viral illness in 2023, and has been struggling with exertional dyspnea, fatigue, weakness, abdominal swelling and LE edema since that time.     Pt was seen by PCP last week and furosemide was discontinued due to elevated Cr. Pt is taking Bumex 1mg daily, aldactone 25mg daily as prescribed. Caregiver notes little urine output during day, pt urinates 3-4 times overnight. They are not tracking daily weights for patient. Pt's wife cooks all meals and generally avoids canned / frozen / foods of convenience or adding additional salt to foods.     He denies previous knowledge or workup for liver disease / cirrhosis. He has been diabetic x 20 years. Remote h/o EtOH (not excess) and tobacco abuse, none currently.     Patient denies associated fever, chills, abdominal pain, indigestion, nausea, vomiting, diarrhea, constipation, hematemesis, dysphagia, hematochezia, melena, bloating, dysuria, jaundice or bruising.    Labs 1/10/24: Na 137, K+ 4.3, BUN 22, Cr 1.6, total bili 0.7, AST 27, ALT 18, alk phos 200    CT Chest 2023: LLL volume loss with peripheral round area of consolidation vs mass measuring 2.3 x 4.9cm. Small left pleural effusion with pleural thickening. Trace right pleural effusion. Moderate ascites in the upper abdomen. Cirrhotic liver.     PET 2024: LLL masslike opacity most consistent with rounded atelectasis.     CSY in remote past.     Subjective       Review of Systems:   Review of Systems   Constitutional:  Positive for fatigue. Negative for appetite change, chills, diaphoresis and fever.   HENT:  Negative for drooling, facial swelling, mouth sores, nosebleeds, rhinorrhea, sore throat, swollen glands, tinnitus and trouble swallowing.    Eyes: Negative.    Respiratory:  Positive for shortness of breath. Negative for choking and chest tightness.    Cardiovascular:  Positive for leg swelling.   Gastrointestinal:  Positive for abdominal distention. Negative for abdominal pain, anal bleeding, blood in stool, constipation, diarrhea, nausea, vomiting, GERD and indigestion.   Endocrine: Negative.    Genitourinary:  Positive for decreased urine volume. Negative for dysuria, flank pain and hematuria.   Musculoskeletal:  Negative for back pain and neck pain.   Skin:  Negative for color change, dry skin, pallor and bruise.   Neurological:  Positive for weakness. Negative for dizziness, tremors, syncope and numbness.   Psychiatric/Behavioral:  Negative for decreased concentration, hallucinations and self-injury. The patient is not nervous/anxious.    All other systems reviewed and are negative.      Past Medical History:   Past Medical History:   Diagnosis Date    Acquired hypothyroidism     Allergic     Allergies     Arthritis     Benign prostatic hyperplasia with nocturia     Bilateral carotid artery disease     Cancer of the skin, basal cell     Cataract     Chronic non-seasonal allergic rhinitis     DUE TO POLLEN    Chronic renal impairment     COPD (chronic obstructive pulmonary disease)     Decreased hearing of both ears     Degenerative lumbar spinal stenosis     Diabetes mellitus     Elevated PSA     Frequent falls     GERD without esophagitis     Heart murmur     High risk medication use     HL (hearing loss)     Hx of bilateral hip replacements     Hx of decompressive lumbar laminectomy     Hx of total knee replacement, bilateral     Hx of transient ischemic  attack (TIA)     Hypertension     Mixed hyperlipidemia due to type 2 diabetes mellitus     Nicotine dependence     No natural teeth     FALSE TEETH    Osteoarthritis     Primary hypertension     Primary osteoarthritis involving multiple joints     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 2 diabetes mellitus     Stroke TIA  2 years ago    Thyroid disease     TIA (transient ischemic attack)     Type 2 diabetes mellitus with diabetic polyneuropathy     Type 2 diabetes mellitus with stage 2 chronic kidney disease, without long-term current use of insulin        Past Surgical History:   Past Surgical History:   Procedure Laterality Date    BACK SURGERY  2007    Lumbar Laminectomy    CARPAL TUNNEL RELEASE  2012    COLONOSCOPY      EYE SURGERY  cataracts    JOINT REPLACEMENT  both hips & knee & back    REPLACEMENT TOTAL KNEE Left 2013    SPINE SURGERY  about 15 yrs ago    TOTAL HIP ARTHROPLASTY Right 2016       Family History:   Family History   Problem Relation Age of Onset    Alzheimer's disease Mother     Diabetes Mother     Hyperlipidemia Mother     Stroke Father     Coronary artery disease Father     Hearing loss Father     Hyperlipidemia Father     Diabetes Sister     Hypertension Sister     Hyperlipidemia Sister     Arthritis Sister     Arthritis Brother     Diabetes Brother     Hearing loss Brother        Social History:   Social History     Socioeconomic History    Marital status:    Tobacco Use    Smoking status: Former     Packs/day: 1.00     Years: 10.00     Additional pack years: 0.00     Total pack years: 10.00     Types: Cigarettes     Passive exposure: Past    Smokeless tobacco: Never    Tobacco comments:     None   Vaping Use    Vaping Use: Never used   Substance and Sexual Activity    Alcohol use: Yes     Alcohol/week: 1.0 standard drink of alcohol     Types: 1 Glasses of wine per week     Comment: None    Drug use: No    Sexual activity: Not Currently     Partners: Male     Comment: With  (male) until  lately - old age problems       Alcohol/Tobacco History:   Social History     Substance and Sexual Activity   Alcohol Use Yes    Alcohol/week: 1.0 standard drink of alcohol    Types: 1 Glasses of wine per week    Comment: None     Social History     Tobacco Use   Smoking Status Former    Packs/day: 1.00    Years: 10.00    Additional pack years: 0.00    Total pack years: 10.00    Types: Cigarettes    Passive exposure: Past   Smokeless Tobacco Never   Tobacco Comments    None       Medications:     Current Outpatient Medications:     furosemide (LASIX) 40 MG tablet, , Disp: , Rfl:     amLODIPine (NORVASC) 5 MG tablet, TAKE 1 TABLET BY MOUTH EVERY DAY, Disp: 90 tablet, Rfl: 1    ASPIRIN 81 PO, Take 81 mg by mouth Daily., Disp: , Rfl:     atorvastatin (LIPITOR) 80 MG tablet, TAKE 1 TABLET BY MOUTH EVERY NIGHT AT BEDTIME, Disp: 90 tablet, Rfl: 0    bumetanide (BUMEX) 1 MG tablet, Take 1 tablet by mouth Daily., Disp: 30 tablet, Rfl: 1    cephalexin (Keflex) 500 MG capsule, Take 1 capsule by mouth 3 (Three) Times a Day., Disp: 30 capsule, Rfl: 0    Cetirizine HCl 10 MG capsule, Take 10 mg by mouth Daily With Breakfast. Indications: Hayfever, Disp: , Rfl:     clopidogrel (PLAVIX) 75 MG tablet, Take 1 tablet by mouth Daily., Disp: 90 tablet, Rfl: 1    levothyroxine (SYNTHROID, LEVOTHROID) 137 MCG tablet, Take 1 tablet by mouth Daily., Disp: 90 tablet, Rfl: 1    losartan (COZAAR) 50 MG tablet, Take 1 tablet by mouth Daily., Disp: 90 tablet, Rfl: 1    meloxicam (MOBIC) 7.5 MG tablet, TAKE 1 TABLET BY MOUTH EVERY DAY, Disp: 30 tablet, Rfl: 5    memantine (NAMENDA) 5 MG tablet, Take 1 tablet by mouth twice daily, Disp: 180 tablet, Rfl: 1    metFORMIN (GLUCOPHAGE) 1000 MG tablet, Take 1 tablet by mouth 2 (Two) Times a Day With Meals., Disp: 180 tablet, Rfl: 1    omeprazole (priLOSEC) 40 MG capsule, TAKE 1 CAPSULE BY MOUTH EVERY DAY, Disp: 90 capsule, Rfl: 0    spironolactone (Aldactone) 25 MG tablet, Take 1 tablet by mouth  "Daily., Disp: 30 tablet, Rfl: 2    Allergies:   No Known Allergies    Objective     Physical Exam:  Vital Signs:   Vitals:    01/18/24 1033   BP: 132/76   BP Location: Left arm   Patient Position: Sitting   Cuff Size: Adult   Pulse: 70   SpO2: 98%   Weight: 85.3 kg (188 lb)   Height: 182.9 cm (72.01\")     Body mass index is 25.49 kg/m².     Physical Exam  Vitals and nursing note reviewed.   Constitutional:       General: He is not in acute distress.     Appearance: Normal appearance. He is ill-appearing (chronically). He is not diaphoretic.      Comments: Ambulates with wheelchair   HENT:      Head: Normocephalic and atraumatic.      Right Ear: External ear normal.      Left Ear: External ear normal.      Ears:      Comments: Very [place]     Nose: Nose normal.      Mouth/Throat:      Mouth: Mucous membranes are moist.      Pharynx: Oropharynx is clear.   Eyes:      General: No scleral icterus.     Conjunctiva/sclera: Conjunctivae normal.      Pupils: Pupils are equal, round, and reactive to light.   Cardiovascular:      Rate and Rhythm: Normal rate and regular rhythm.      Pulses: Normal pulses.      Heart sounds: Normal heart sounds.   Pulmonary:      Effort: Pulmonary effort is normal.      Breath sounds: Normal breath sounds.   Abdominal:      General: Abdomen is flat. There is no distension.      Tenderness: There is no abdominal tenderness. There is no guarding or rebound.      Comments: No significant distention or fluid wave appreciated on exam. Exam is limited due to pt's positioning in wheelchair and physical limitations.    Musculoskeletal:         General: Normal range of motion.      Cervical back: Normal range of motion.      Right lower leg: Edema (2+ to knee) present.      Left lower leg: Edema (2+ to knee) present.   Skin:     General: Skin is warm and dry.      Coloration: Skin is not jaundiced or pale.   Neurological:      General: No focal deficit present.      Mental Status: He is alert and oriented " to person, place, and time. Mental status is at baseline.      Comments: No asterixis on exam   Psychiatric:         Mood and Affect: Mood normal.         Assessment / Plan      Assessment/Plan:   There are no diagnoses linked to this encounter.     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  Ascites  LE edema  Pleural effusion via CT Chest 12/2023   - MELD-Na / Child Rodrigez Score pending labs today    - Last Para: N/A, will schedule for liver US + paracentesis at earliest convenience   - Ascites Management: continue Bumex 1mg daily, aldactone 25mg daily    - HCC Surveillance: CT Chest w/ contrast 12/2023, obtain liver US + AFP today    - EV Surveillance: no previous EGD   - HE Management: N/A   - pt given cirrhosis lifestyle instructions: avoid hepatotoxins, limit APAP < 2g per day, avoid ASA and NSAIDs, diet of 35-40kcal/day, protein 1.2-1.5g/kg/day   - all recent labs, imaging, endoscopy and hospital records reviewed   - obtain CBC, CMP, NH3, AFP, PT/INR, BNP   - will discuss with PCP role of cardiac workup, ECHO, etc to assess for other sources of fluid overload / pleural effusion / LE edema   - follow up in clinic after completion of above studies   - call clinic at any time for questions or new / worsened sx    Follow Up:   Return for Next scheduled follow up.    Plan of care reviewed with the patient at the conclusion of today's visit.  Education was provided regarding diagnosis, management, and any prescribed or recommended OTC medications.  Patient verbalized understanding of and agreement with management plan.     NOTE TO PATIENT: The 21st Century Cures Act makes medical notes like these available to patients in the interest of transparency. However, be advised this is a medical document. It is intended as peer to peer communication. It is written in medical language and may contain abbreviations or verbiage that are unfamiliar. It may appear blunt or direct. Medical documents are intended to carry relevant  information, facts as evident, and the clinical opinion of the practitioner.     Time Statement:   Discussed plan of care in detail with patient today. Patient verbally understands and agrees. I have spent 60 minutes reviewing available diagnostics, obtaining history, examining the patient, developing a treatment plan, and educating the patient on disease process and plan of care.    Sheba Pereira PA-C   [facility]

## 2024-01-19 ENCOUNTER — TELEPHONE (OUTPATIENT)
Dept: INFUSION THERAPY | Facility: HOSPITAL | Age: 89
End: 2024-01-19
Payer: MEDICARE

## 2024-01-19 NOTE — TELEPHONE ENCOUNTER
Pt's son contacted as pre-procedure phone call prior to planned paracentesis for 1/22/24. Reviewed  arrival time, location, nothing to eat or drink by mouth, okay to take blood pressure medications morning of procedure with a small sip of water,  needed, reviewed procedure instructions and allowed time for questions, and reviewed home medications, allergies, and medical history.

## 2024-01-22 ENCOUNTER — HOSPITAL ENCOUNTER (OUTPATIENT)
Dept: ULTRASOUND IMAGING | Facility: HOSPITAL | Age: 89
Discharge: HOME | End: 2024-01-22
Admitting: PHYSICIAN ASSISTANT
Payer: MEDICARE

## 2024-01-22 ENCOUNTER — TELEPHONE (OUTPATIENT)
Dept: FAMILY MEDICINE CLINIC | Facility: CLINIC | Age: 89
End: 2024-01-22
Payer: MEDICARE

## 2024-01-22 VITALS
TEMPERATURE: 97.8 F | BODY MASS INDEX: 25.6 KG/M2 | HEART RATE: 69 BPM | WEIGHT: 189 LBS | OXYGEN SATURATION: 97 % | SYSTOLIC BLOOD PRESSURE: 128 MMHG | RESPIRATION RATE: 18 BRPM | HEIGHT: 72 IN | DIASTOLIC BLOOD PRESSURE: 72 MMHG

## 2024-01-22 DIAGNOSIS — K74.60 LIVER CIRRHOSIS SECONDARY TO NASH: ICD-10-CM

## 2024-01-22 DIAGNOSIS — R60.0 EXTREMITY EDEMA: ICD-10-CM

## 2024-01-22 DIAGNOSIS — R18.8 OTHER ASCITES: ICD-10-CM

## 2024-01-22 DIAGNOSIS — I50.21 ACUTE SYSTOLIC CHF (CONGESTIVE HEART FAILURE): Primary | ICD-10-CM

## 2024-01-22 DIAGNOSIS — I11.0 HYPERTENSIVE HEART DISEASE WITH HEART FAILURE: ICD-10-CM

## 2024-01-22 DIAGNOSIS — K75.81 LIVER CIRRHOSIS SECONDARY TO NASH: ICD-10-CM

## 2024-01-22 DIAGNOSIS — J90 PLEURAL EFFUSION: ICD-10-CM

## 2024-01-22 LAB
ALBUMIN SERPL-MCNC: 3.2 G/DL (ref 3.5–5.2)
ALBUMIN/GLOB SERPL: 1.1 G/DL
ALP SERPL-CCNC: 162 U/L (ref 39–117)
ALPHA-FETOPROTEIN: <2 NG/ML (ref 0–8.3)
ALT SERPL W P-5'-P-CCNC: 11 U/L (ref 1–41)
AMMONIA BLD-SCNC: 16 UMOL/L (ref 16–60)
ANION GAP SERPL CALCULATED.3IONS-SCNC: 16 MMOL/L (ref 5–15)
APPEARANCE FLD: ABNORMAL
AST SERPL-CCNC: 21 U/L (ref 1–40)
BASOPHILS # BLD AUTO: 0.12 10*3/MM3 (ref 0–0.2)
BASOPHILS NFR BLD AUTO: 1.2 % (ref 0–1.5)
BILIRUB SERPL-MCNC: 0.7 MG/DL (ref 0–1.2)
BUN SERPL-MCNC: 20 MG/DL (ref 8–23)
BUN/CREAT SERPL: 14.9 (ref 7–25)
CALCIUM SPEC-SCNC: 7.8 MG/DL (ref 8.2–9.6)
CHLORIDE SERPL-SCNC: 101 MMOL/L (ref 98–107)
CO2 SERPL-SCNC: 22 MMOL/L (ref 22–29)
COLOR FLD: YELLOW
CREAT SERPL-MCNC: 1.34 MG/DL (ref 0.76–1.27)
DEPRECATED RDW RBC AUTO: 57.2 FL (ref 37–54)
EGFRCR SERPLBLD CKD-EPI 2021: 50.3 ML/MIN/1.73
EOSINOPHIL # BLD AUTO: 0.21 10*3/MM3 (ref 0–0.4)
EOSINOPHIL NFR BLD AUTO: 2 % (ref 0.3–6.2)
ERYTHROCYTE [DISTWIDTH] IN BLOOD BY AUTOMATED COUNT: 17.8 % (ref 12.3–15.4)
GLOBULIN UR ELPH-MCNC: 2.8 GM/DL
GLUCOSE BLDC GLUCOMTR-MCNC: 125 MG/DL (ref 70–130)
GLUCOSE SERPL-MCNC: 118 MG/DL (ref 65–99)
HCT VFR BLD AUTO: 32.9 % (ref 37.5–51)
HGB BLD-MCNC: 10.8 G/DL (ref 13–17.7)
IMM GRANULOCYTES # BLD AUTO: 0.06 10*3/MM3 (ref 0–0.05)
IMM GRANULOCYTES NFR BLD AUTO: 0.6 % (ref 0–0.5)
INR PPP: 1.08 (ref 0.89–1.12)
INR PPP: 1.12 (ref 0.89–1.12)
LYMPHOCYTES # BLD AUTO: 2.1 10*3/MM3 (ref 0.7–3.1)
LYMPHOCYTES NFR BLD AUTO: 20.4 % (ref 19.6–45.3)
LYMPHOCYTES NFR FLD MANUAL: 10 %
MACROPHAGE FLUID: 13 %
MCH RBC QN AUTO: 28.8 PG (ref 26.6–33)
MCHC RBC AUTO-ENTMCNC: 32.8 G/DL (ref 31.5–35.7)
MCV RBC AUTO: 87.7 FL (ref 79–97)
MESOTHL CELL NFR FLD MANUAL: 74 %
MONOCYTES # BLD AUTO: 0.83 10*3/MM3 (ref 0.1–0.9)
MONOCYTES NFR BLD AUTO: 8.1 % (ref 5–12)
NEUTROPHILS NFR BLD AUTO: 6.97 10*3/MM3 (ref 1.7–7)
NEUTROPHILS NFR BLD AUTO: 67.7 % (ref 42.7–76)
NEUTROPHILS NFR FLD MANUAL: 3 %
NRBC BLD AUTO-RTO: 0 /100 WBC (ref 0–0.2)
NT-PROBNP SERPL-MCNC: 7041 PG/ML (ref 0–1800)
PLATELET # BLD AUTO: 466 10*3/MM3 (ref 140–450)
PMV BLD AUTO: 9.2 FL (ref 6–12)
POTASSIUM SERPL-SCNC: 4.2 MMOL/L (ref 3.5–5.2)
PROT SERPL-MCNC: 6 G/DL (ref 6–8.5)
PROTHROMBIN TIME: 14.2 SECONDS (ref 12.2–14.5)
PROTHROMBIN TIME: 14.6 SECONDS (ref 12.2–14.5)
RBC # BLD AUTO: 3.75 10*6/MM3 (ref 4.14–5.8)
RBC # FLD AUTO: <2000 /MM3
SODIUM SERPL-SCNC: 139 MMOL/L (ref 136–145)
WBC # FLD AUTO: 249 /MM3
WBC NRBC COR # BLD AUTO: 10.29 10*3/MM3 (ref 3.4–10.8)

## 2024-01-22 PROCEDURE — 82140 ASSAY OF AMMONIA: CPT | Performed by: PHYSICIAN ASSISTANT

## 2024-01-22 PROCEDURE — 82948 REAGENT STRIP/BLOOD GLUCOSE: CPT

## 2024-01-22 PROCEDURE — 85610 PROTHROMBIN TIME: CPT | Performed by: RADIOLOGY

## 2024-01-22 PROCEDURE — 89051 BODY FLUID CELL COUNT: CPT | Performed by: PHYSICIAN ASSISTANT

## 2024-01-22 PROCEDURE — P9047 ALBUMIN (HUMAN), 25%, 50ML: HCPCS | Performed by: PHYSICIAN ASSISTANT

## 2024-01-22 PROCEDURE — 87205 SMEAR GRAM STAIN: CPT | Performed by: PHYSICIAN ASSISTANT

## 2024-01-22 PROCEDURE — 85025 COMPLETE CBC W/AUTO DIFF WBC: CPT | Performed by: RADIOLOGY

## 2024-01-22 PROCEDURE — 87015 SPECIMEN INFECT AGNT CONCNTJ: CPT | Performed by: PHYSICIAN ASSISTANT

## 2024-01-22 PROCEDURE — 82105 ALPHA-FETOPROTEIN SERUM: CPT | Performed by: PHYSICIAN ASSISTANT

## 2024-01-22 PROCEDURE — 83880 ASSAY OF NATRIURETIC PEPTIDE: CPT | Performed by: PHYSICIAN ASSISTANT

## 2024-01-22 PROCEDURE — 25010000002 ALBUMIN HUMAN 25% PER 50 ML: Performed by: PHYSICIAN ASSISTANT

## 2024-01-22 PROCEDURE — 80053 COMPREHEN METABOLIC PANEL: CPT | Performed by: PHYSICIAN ASSISTANT

## 2024-01-22 PROCEDURE — 76942 ECHO GUIDE FOR BIOPSY: CPT

## 2024-01-22 PROCEDURE — 85610 PROTHROMBIN TIME: CPT | Performed by: PHYSICIAN ASSISTANT

## 2024-01-22 PROCEDURE — 87070 CULTURE OTHR SPECIMN AEROBIC: CPT | Performed by: PHYSICIAN ASSISTANT

## 2024-01-22 RX ORDER — ALBUMIN (HUMAN) 12.5 G/50ML
12.5 SOLUTION INTRAVENOUS ONCE
Status: COMPLETED | OUTPATIENT
Start: 2024-01-22 | End: 2024-01-22

## 2024-01-22 RX ORDER — SODIUM CHLORIDE 0.9 % (FLUSH) 0.9 %
10 SYRINGE (ML) INJECTION AS NEEDED
Status: DISCONTINUED | OUTPATIENT
Start: 2024-01-22 | End: 2024-01-23 | Stop reason: HOSPADM

## 2024-01-22 RX ORDER — LIDOCAINE HYDROCHLORIDE 10 MG/ML
10 INJECTION, SOLUTION EPIDURAL; INFILTRATION; INTRACAUDAL; PERINEURAL ONCE
Status: COMPLETED | OUTPATIENT
Start: 2024-01-22 | End: 2024-01-22

## 2024-01-22 RX ADMIN — LIDOCAINE HYDROCHLORIDE 10 ML: 10 INJECTION, SOLUTION EPIDURAL; INFILTRATION; INTRACAUDAL; PERINEURAL at 13:24

## 2024-01-22 RX ADMIN — ALBUMIN HUMAN 12.5 G: 0.25 SOLUTION INTRAVENOUS at 14:15

## 2024-01-22 NOTE — NURSING NOTE
Image guided paracentesis performed by MD Monk and Ana CHRISTINE. 3.5mls mls of  clear yellow fluid removed from peritoneum. Patient tolerated well. Report given to RN at bedside.  
Quality 137: Melanoma: Continuity Of Care - Recall System: Patient information entered into a recall system that includes: target date for the next exam specified AND a process to follow up with patients regarding missed or unscheduled appointments
Detail Level: Detailed

## 2024-01-22 NOTE — TELEPHONE ENCOUNTER
Hub to relay:     I been chatting with his gastroenterologist.  Some of the testing she was doing is looking more like it may be heart related with his fluid then related to his liver.  We need to make an appointment with cardiology for further evaluation.  Please let them know that I was out of the office today with COVID.  We can do a virtual visit basically any day this week.  Just find a time to put them if they do want to discuss things further.  I am placing referral for cardiology.

## 2024-01-22 NOTE — POST-PROCEDURE NOTE
Vascular Interventional Radiology  Procedure Note    Date: 01/22/24      Time: 13:10 EST     Pre-op Diagnosis: Ascites     Post-op Diagnosis: Ascites    Procedure: US guided Paracentesis. Via LQ. See report for details.    Volume removed: See report.    Surgeon: Franky Monk MD     Assistants: NA    Sedation: None    Estimated Blood Loss (EBL): 0 cc    IVF: NA    Findings: Above    Specimens: See report.    Complications: See report.    Disposition: IR recovery.    Franky Monk MD    Vascular Interventional Radiology

## 2024-01-22 NOTE — PRE-PROCEDURE NOTE
[facility]   Vascular Interventional Radiology  History & Physicial          Reason for Consult: Paracentesis    Current Medications:    Current Outpatient Medications:     amLODIPine (NORVASC) 5 MG tablet, TAKE 1 TABLET BY MOUTH EVERY DAY, Disp: 90 tablet, Rfl: 1    ASPIRIN 81 PO, Take 81 mg by mouth Daily., Disp: , Rfl:     atorvastatin (LIPITOR) 80 MG tablet, TAKE 1 TABLET BY MOUTH EVERY NIGHT AT BEDTIME, Disp: 90 tablet, Rfl: 0    bumetanide (BUMEX) 1 MG tablet, Take 1 tablet by mouth Daily., Disp: 30 tablet, Rfl: 1    cephalexin (Keflex) 500 MG capsule, Take 1 capsule by mouth 3 (Three) Times a Day., Disp: 30 capsule, Rfl: 0    Cetirizine HCl 10 MG capsule, Take 10 mg by mouth Daily With Breakfast. Indications: Hayfever, Disp: , Rfl:     clopidogrel (PLAVIX) 75 MG tablet, Take 1 tablet by mouth Daily., Disp: 90 tablet, Rfl: 1    levothyroxine (SYNTHROID, LEVOTHROID) 137 MCG tablet, Take 1 tablet by mouth Daily., Disp: 90 tablet, Rfl: 1    losartan (COZAAR) 50 MG tablet, Take 1 tablet by mouth Daily., Disp: 90 tablet, Rfl: 1    meloxicam (MOBIC) 7.5 MG tablet, TAKE 1 TABLET BY MOUTH EVERY DAY, Disp: 30 tablet, Rfl: 5    memantine (NAMENDA) 5 MG tablet, Take 1 tablet by mouth twice daily, Disp: 180 tablet, Rfl: 1    metFORMIN (GLUCOPHAGE) 1000 MG tablet, Take 1 tablet by mouth 2 (Two) Times a Day With Meals., Disp: 180 tablet, Rfl: 1    omeprazole (priLOSEC) 40 MG capsule, TAKE 1 CAPSULE BY MOUTH EVERY DAY, Disp: 90 capsule, Rfl: 0    spironolactone (Aldactone) 25 MG tablet, Take 1 tablet by mouth Daily., Disp: 30 tablet, Rfl: 2    Current Facility-Administered Medications:     albumin human 25 % IV SOLN 12.5 g, 12.5 g, Intravenous, Once, Sheba Pereira PA-C    sodium chloride 0.9 % flush 10 mL, 10 mL, Intravenous, PRN, Franky Monk MD     Allergies:  No Known Allergies    I have personally reviewed the results from the time of this admission to 1/22/2024 13:08 EST and agree with these  "findings.  [x]  Laboratory  []  Microbiology  [x]  Radiology  []  EKG/Telemetry   []  Cardiology/Vascular   []  Pathology  []  Old records  []  Other:    Most notable findings include: As noted:    Results from last 7 days   Lab Units 01/22/24  1154 01/22/24  1128   INR  1.12 1.08   WBC 10*3/mm3  --  10.29   HEMOGLOBIN g/dL  --  10.8*   HEMATOCRIT %  --  32.9*   PLATELETS 10*3/mm3  --  466*       Results from last 7 days   Lab Units 01/18/24  1115   SODIUM mmol/L 137   POTASSIUM mmol/L 4.7   CHLORIDE mmol/L 98   CO2 mmol/L 22   BUN mg/dL 19   CREATININE mg/dL 1.40*   GLUCOSE mg/dL 176*           Lab 01/18/24  1115   ALBUMIN 3.2*   ALT (SGPT) 14   AST (SGOT) 28   BILIRUBIN 0.7   ALK PHOS 172*       Estimated Creatinine Clearance: 42.5 mL/min (A) (by C-G formula based on SCr of 1.4 mg/dL (H)). No results found for: \"CREATININE\"    No results found for: \"COVID19\"     No results found for: \"PREGTESTUR\", \"PREGSERUM\", \"HCG\", \"HCGQUANT\"     ASA SCALE ASSESSMENT (applicable ONLY if sedation planned):        MALLAMPATI CLASSIFICATION (applicable ONLY if sedation planned):       Plan:   As above.    Notice: The note was created before the performance of the procedure. It might have been left in the pending status and signed off after the procedure. The time stamp on the note may be misleading.    EMMETT Ford   Vascular Interventional Radiology  01/22/24   1:08 PM EST      "

## 2024-01-22 NOTE — TELEPHONE ENCOUNTER
I been chatting with his gastroenterologist.  Some of the testing she was doing is looking more like it may be heart related with his fluid then related to his liver.  We need to make an appointment with cardiology for further evaluation.  Please let them know that I was out of the office today with COVID.  We can do a virtual visit basically any day this week.  Just find a time to put them if they do want to discuss things further.  I am placing referral for cardiology.

## 2024-01-23 ENCOUNTER — HOME CARE VISIT (OUTPATIENT)
Dept: HOME HEALTH SERVICES | Facility: HOME HEALTHCARE | Age: 89
End: 2024-01-23
Payer: MEDICARE

## 2024-01-23 ENCOUNTER — TELEPHONE (OUTPATIENT)
Dept: INFUSION THERAPY | Facility: HOSPITAL | Age: 89
End: 2024-01-23
Payer: MEDICARE

## 2024-01-23 VITALS
OXYGEN SATURATION: 98 % | DIASTOLIC BLOOD PRESSURE: 78 MMHG | SYSTOLIC BLOOD PRESSURE: 134 MMHG | TEMPERATURE: 98.1 F | HEART RATE: 74 BPM | RESPIRATION RATE: 16 BRPM

## 2024-01-23 PROCEDURE — G0495 RN CARE TRAIN/EDU IN HH: HCPCS

## 2024-01-23 NOTE — HOME HEALTH
Routine Visit Note:    Skill/education provided: Lungs assessed. Right lung clear and left lung slightly diminshed. Pt had paracentesis done this week w/ 3.5L taken off. Pt is using incentive spirometer some. Instructed pt on continuing to use.     Patient/caregiver response: pt verbalized understanding.    Plan for next visit: Pulmonary Assessment/ follow up w/ cardiology appt     Other pertinent info: Pt has cardiology appt on 1/24/24

## 2024-01-24 ENCOUNTER — TELEPHONE (OUTPATIENT)
Dept: CARDIOLOGY | Facility: CLINIC | Age: 89
End: 2024-01-24

## 2024-01-24 ENCOUNTER — OFFICE VISIT (OUTPATIENT)
Dept: CARDIOLOGY | Facility: CLINIC | Age: 89
End: 2024-01-24
Payer: MEDICARE

## 2024-01-24 VITALS
OXYGEN SATURATION: 98 % | DIASTOLIC BLOOD PRESSURE: 69 MMHG | HEIGHT: 72 IN | WEIGHT: 189 LBS | SYSTOLIC BLOOD PRESSURE: 130 MMHG | HEART RATE: 70 BPM | RESPIRATION RATE: 16 BRPM | BODY MASS INDEX: 25.6 KG/M2

## 2024-01-24 DIAGNOSIS — I48.92 ATRIAL FLUTTER WITH CONTROLLED RESPONSE: ICD-10-CM

## 2024-01-24 DIAGNOSIS — I35.0 AORTIC STENOSIS, SEVERE: Primary | ICD-10-CM

## 2024-01-24 LAB — REF LAB TEST METHOD: NORMAL

## 2024-01-24 RX ORDER — BUMETANIDE 1 MG/1
2 TABLET ORAL DAILY
Qty: 30 TABLET | Refills: 1 | Status: SHIPPED | OUTPATIENT
Start: 2024-01-24

## 2024-01-24 NOTE — ASSESSMENT & PLAN NOTE
Patient with known aortic stenosis mild to moderate on echo from 2019.  Symptoms for the last few weeks compatible with CHF, with shortness of breath on mild daily activities, sometimes at rest, significant volume overload, cardiac cirrhosis, decreased exercise capacity.  Exam suggestive of very severe aortic stenosis, with pulses parvus at tardus, decreased overall peripheral pulses and neck radiation.  Plan:  Transthoracic echocardiogram  Advance care discussion with family in view of onset of symptoms November 2023, and usually poor prognosis with median survival of 2 years, if no intervention done.  Risk benefits of TAVR discussed with patient and explained to family.  Family meeting next week to let us know how to move forward.  Increase bumetanide 1 mg to 2 tabs daily  Labs in 1 week

## 2024-01-24 NOTE — ASSESSMENT & PLAN NOTE
New onset atrial flutter.  Rate controlled spontaneously on patient current medical therapy, suggestive of AV jack disease.  Blood pressure controlled.  Plan:  Start Eliquis 2.5 mg 1 tab twice daily  Apixaban factor Xa activity levels in 1 week  Current atrial flutter falls under the category of valvular atrial fibrillation/flutter, nonetheless no evident mitral valve disease which carries a high risk of embolization, only aortic valve disease.  Can consider warfarin, but might be unreasonable considering patient age and very limited mobility, unless a home INR monitor use.  CHF management as above

## 2024-01-24 NOTE — PROGRESS NOTES
[facility] [place]  [facility]  [address] [place] [address]  Dept: [phone]  Dept Fax: [phone]    Date: 01/24/2024  Patient: Onel Clark  YOB: 1933    New Patient Office Note    Consult Reason:  Mr. Onel Clark is a 89 y.o. male who presents to the clinic to establish care, seen for Congestive Heart Failure.   Patient complaining since November of shortness of breath and weakness, as well as leg swelling and ascites.  He saw pulmonary for shortness of breath and lung nodule, and GI for ascites possible liver cirrhosis.  Patient here today in a wheelchair, comfortable, breathing on room air.  Patient admits shortness of breath on mild daily activities as well as decreased strength/difficulty ambulating even with the help of a wheelchair.  Patient denies chest pain or syncope.  Admits significant swelling of the lower extremity for the last few weeks.    The following portions of the patient's history were reviewed and updated as appropriate: allergies, current medications, past family history, past medical history, past social history, past surgical history, and problem list.    Medications: No Known Allergies   Current Outpatient Medications   Medication Instructions    amLODIPine (NORVASC) 5 MG tablet TAKE 1 TABLET BY MOUTH EVERY DAY    apixaban (ELIQUIS) 2.5 mg, Oral, 2 Times Daily    ASPIRIN 81 PO 81 mg, Oral, Daily    atorvastatin (LIPITOR) 80 mg, Oral, Every Night at Bedtime    bumetanide (BUMEX) 2 mg, Oral, Daily    cephalexin (KEFLEX) 500 mg, Oral, 3 Times Daily    Cetirizine HCl 10 mg, Oral, Daily With Breakfast    levothyroxine (SYNTHROID, LEVOTHROID) 137 mcg, Oral, Daily    losartan (COZAAR) 50 mg, Oral, Daily    memantine (NAMENDA) 5 MG tablet Take 1 tablet by mouth twice daily    metFORMIN (GLUCOPHAGE) 1,000 mg, Oral, 2 Times Daily With Meals    omeprazole (PRILOSEC) 40 mg, Oral, Daily    spironolactone (ALDACTONE) 25 mg, Oral,  Daily       Subjective  Past Medical History:   Diagnosis Date    Acquired hypothyroidism     Allergic     Allergies     Arthritis     Benign prostatic hyperplasia with nocturia     Bilateral carotid artery disease     Cancer of the skin, basal cell     Cataract     Chronic non-seasonal allergic rhinitis     DUE TO POLLEN    Chronic renal impairment     COPD (chronic obstructive pulmonary disease)     Decreased hearing of both ears     Degenerative lumbar spinal stenosis     Diabetes mellitus     Elevated PSA     Frequent falls     GERD without esophagitis     Heart murmur     High risk medication use     HL (hearing loss)     Hx of bilateral hip replacements     Hx of decompressive lumbar laminectomy     Hx of total knee replacement, bilateral     Hx of transient ischemic attack (TIA)     Hypertension     Mixed hyperlipidemia due to type 2 diabetes mellitus     Nicotine dependence     No natural teeth     FALSE TEETH    Osteoarthritis     Primary hypertension     Primary osteoarthritis involving multiple joints     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 2 diabetes mellitus     Stroke TIA  2 years ago    Thyroid disease     TIA (transient ischemic attack)     Type 2 diabetes mellitus with diabetic polyneuropathy     Type 2 diabetes mellitus with stage 2 chronic kidney disease, without long-term current use of insulin        Past Surgical History:   Procedure Laterality Date    BACK SURGERY  2007    Lumbar Laminectomy    CARPAL TUNNEL RELEASE  2012    COLONOSCOPY      EYE SURGERY  cataracts    JOINT REPLACEMENT  both hips & knee & back    REPLACEMENT TOTAL KNEE Left 2013    SPINE SURGERY  about 15 yrs ago    TOTAL HIP ARTHROPLASTY Right 2016       Family History   Problem Relation Age of Onset    Alzheimer's disease Mother     Diabetes Mother     Hyperlipidemia Mother     Stroke Father     Coronary artery disease Father     Hearing loss Father     Hyperlipidemia Father     Diabetes Sister     Hypertension Sister     Hyperlipidemia  "Sister     Arthritis Sister     Arthritis Brother     Diabetes Brother     Hearing loss Brother         Social History     Socioeconomic History    Marital status:    Tobacco Use    Smoking status: Former     Packs/day: 1.00     Years: 10.00     Additional pack years: 0.00     Total pack years: 10.00     Types: Cigarettes     Passive exposure: Past    Smokeless tobacco: Never    Tobacco comments:     None   Vaping Use    Vaping Use: Never used   Substance and Sexual Activity    Alcohol use: Yes     Alcohol/week: 1.0 standard drink of alcohol     Types: 1 Glasses of wine per week     Comment: None    Drug use: No    Sexual activity: Not Currently     Partners: Male     Comment: With  (male) until lately - old age problems       Objective  Vitals:    01/24/24 1254   BP: 130/69   BP Location: Right arm   Patient Position: Sitting   Cuff Size: Adult   Pulse: 70   Resp: 16   SpO2: 98%   Weight: 85.7 kg (189 lb)   Height: 182.9 cm (72.01\")   PainSc: 0-No pain     Vitals:    01/24/24 1254   BP: 130/69   BP Location: Right arm   Patient Position: Sitting   Cuff Size: Adult   Pulse: 70   Resp: 16   SpO2: 98%   Weight: 85.7 kg (189 lb)   Height: 182.9 cm (72.01\")        Physical Exam  Constitutional:       Appearance: Healthy appearance. Not in distress.   Eyes:      Pupils: Pupils are equal, round, and reactive to light.   HENT:    Mouth/Throat:      Mouth: Mucous membranes are moist.   Neck:      Vascular: No carotid bruit, hepatojugular reflux, JVD or JVR. JVD normal.   Pulmonary:      Effort: Pulmonary effort is normal.      Breath sounds: Normal breath sounds. No wheezing. No rhonchi. No rales.   Chest:      Chest wall: Not tender to palpatation.   Cardiovascular:      PMI at left midclavicular line. Normal rate. Regular rhythm. Normal S1 with normal intensity. Normal S2 with normal intensity.       Murmurs: There is a grade 3/6 harsh midsystolic murmur at the URSB, radiating to the neck.      No gallop.  " "No click. No rub.      Comments: 2+ bilateral lower extremity mild partially pitting, with poor circulation  Pulses:     Carotid: 1+ bilaterally.     Radial: 1+ bilaterally.     Popliteal: 1+ bilaterally.     Dorsalis pedis: 1+ bilaterally.  Edema:     Peripheral edema present.     Ankle: bilateral 2+ edema of the ankle.     Feet: bilateral 2+ edema of the feet.  Abdominal:      General: There is no abdominal bruit.   Skin:     General: Skin is warm.   Neurological:      Mental Status: Alert and oriented to person, place and time.          Labs:  Lab Results   Component Value Date     01/22/2024    K 4.2 01/22/2024     01/22/2024    CO2 22.0 01/22/2024    BUN 20 01/22/2024    CREATININE 1.34 (H) 01/22/2024    CALCIUM 7.8 (L) 01/22/2024    BILITOT 0.7 01/22/2024    ALKPHOS 162 (H) 01/22/2024    ALT 11 01/22/2024    AST 21 01/22/2024    GLUCOSE 118 (H) 01/22/2024    ALBUMIN 3.2 (L) 01/22/2024     Lab Results   Component Value Date    WBC 10.29 01/22/2024    HGB 10.8 (L) 01/22/2024    HCT 32.9 (L) 01/22/2024     (H) 01/22/2024     Lab Results   Component Value Date    INR 1.12 01/22/2024    INR 1.08 01/22/2024     No results found for: \"CKTOTAL\", \"TROPONINI\", \"TROPONINT\", \"CKMBINDEX\", \"BNP\"  Lab Results   Component Value Date    PROBNP 7,041.0 (H) 01/22/2024       Lab Results   Component Value Date    CHLPL 112 06/16/2023    TRIG 83 06/16/2023    HDL 49 06/16/2023    LDL 47 06/16/2023     Lab Results   Component Value Date    TSH 2.090 01/26/2023       The ASCVD Risk score (Tico ROSE, et al., 2019) failed to calculate for the following reasons:    The 2019 ASCVD risk score is only valid for ages 40 to 79     [facility] Diagn[facility]:    ECG 12 Lead    Date/Time: 1/24/2024 1:08 PM  Performed by: Oscar Shannon MD    Authorized by: Oscar Shannon MD  Comparison: compared with previous ECG from 4/12/2019  Comparison to previous ECG: Sinus rhythm with first-degree AV block and low voltages limb leads prior on " prior EKG   Rhythm: atrial flutter  Other findings: low voltage and poor R wave progression          CXR: No results found for this or any previous visit.     ECHO/MUGA: No results found for this or any previous visit.     STRESS TESTS: No results found for this or any previous visit.     CARDIAC CATH: No results found for this or any previous visit.     DEVICES: No valid procedures specified.   HOLTER: No results found for this or any previous visit.     CT/MRI:  Results for orders placed in visit on 12/04/23    CT Chest Without Contrast Diagnostic    Narrative  CT CHEST WO CONTRAST DIAGNOSTIC    Date of Exam: 12/4/2023 2:16 PM EST    Indication: Abnormal chest x-ray, 6 cm left lower lung mass with pleural thickening versus an effusion.    Comparison: Chest x-ray performed on 11/15/2023.    Technique: Axial CT images were obtained of the chest without contrast administration.  Reconstructed coronal and sagittal images were also obtained. Automated exposure control and iterative construction methods were used.      Findings:  There is a left lower lobe volume loss with a peripheral round area of consolidation versus a mass measuring 2.3 x 4.9 cm on image 42 of series 4. Round atelectasis is a possibility. I cannot exclude a neoplasm. I would recommend a PET/CT exam for  further assessment. There are bandlike linear densities in the left lower lobe, lingula, and to a lesser degree the right lower lobe consistent with scarring versus subsegmental atelectasis. There is a small left pleural effusion. There is underlying  pleural thickening. There is a trace right pleural effusion. There is also a trace pericardial effusion with pericardial calcifications. There is suggestion of a 6 mm nodule in the right lower lobe adjacent to the hemidiaphragm on image 42 of series 4.  There are no enlarged mediastinal lymph nodes. There is no definite hilar adenopathy. There are atherosclerotic vascular calcifications which includes  involvement of the coronary arteries. Within the upper abdomen, there is moderate ascites. The liver  has a cirrhotic appearance. There is a partially imaged round mass in the upper pole of the right kidney which probably represents a cyst. There are no suspicious osteolytic or sclerotic lesions in the bony structures. The patient has underlying  degenerative changes.    Impression  Impression:    1. Left lower lobe volume loss with a peripheral round area of consolidation versus a mass in the left lower lobe measuring 2.3 x 4.9 cm. Diagnostic considerations would include round atelectasis versus a neoplasm. I would recommend a PET/CT exam for  further assessment.  2. Suggestion of a 6 mm nodule in the right lower lobe adjacent to the hemidiaphragm.  3. There is a small left pleural effusion with pleural thickening. There is a trace right pleural effusion. There is also a trace pericardial effusion with pericardial calcifications.  4. Scarring versus subsegmental atelectasis in the left lower lobe, lingula, and to a lesser degree the right lower lobe.  5. Moderate ascites in the upper abdomen.  6. Cirrhotic liver.  7. Additional findings as noted above.        Electronically Signed: Joby Salmon MD  12/6/2023 10:05 AM EST  Workstation ID: [id number]    VASCULAR: No valid procedures specified.     Assessment and Plan  Diagnoses and all orders for this visit:    1. Aortic stenosis, severe (Primary)  Assessment & Plan:  Patient with known aortic stenosis mild to moderate on echo from 2019.  Symptoms for the last few weeks compatible with CHF, with shortness of breath on mild daily activities, sometimes at rest, significant volume overload, cardiac cirrhosis, decreased exercise capacity.  Exam suggestive of very severe aortic stenosis, with pulses parvus at tardus, decreased overall peripheral pulses and neck radiation.  Plan:  Transthoracic echocardiogram  Advance care discussion with family in view of onset of symptoms  November 2023, and usually poor prognosis with median survival of 2 years, if no intervention done.  Risk benefits of TAVR discussed with patient and explained to family.  Family meeting next week to let us know how to move forward.  Increase bumetanide 1 mg to 2 tabs daily  Labs in 1 week    Orders:  -     Adult Transthoracic Echo Complete W/ Cont if Necessary Per Protocol; Future  -     bumetanide (BUMEX) 1 MG tablet; Take 2 tablets by mouth Daily.  Dispense: 30 tablet; Refill: 1    2. Atrial flutter with controlled response  Assessment & Plan:  New onset atrial flutter.  Rate controlled spontaneously on patient current medical therapy, suggestive of AV jack disease.  Blood pressure controlled.  Plan:  Start Eliquis 2.5 mg 1 tab twice daily  Apixaban factor Xa activity levels in 1 week  Current atrial flutter falls under the category of valvular atrial fibrillation/flutter, nonetheless no evident mitral valve disease which carries a high risk of embolization, only aortic valve disease.  Can consider warfarin, but might be unreasonable considering patient age and very limited mobility, unless a home INR monitor use.  CHF management as above    Orders:  -     ECG 12 Lead  -     apixaban (ELIQUIS) 2.5 MG tablet tablet; Take 1 tablet by mouth 2 (Two) Times a Day.  Dispense: 180 tablet; Refill: 3         Return in about 1 week (around 1/31/2024).    There are no Patient Instructions on file for this visit.    Oscar Shannon MD

## 2024-01-25 LAB
BACTERIA FLD CULT: NORMAL
GRAM STN SPEC: NORMAL
GRAM STN SPEC: NORMAL

## 2024-01-25 NOTE — TELEPHONE ENCOUNTER
Mckayla called and moved up the appt.   Pt called but no answer. Left message. INR down to 1.6. Pt currently on 2.5 mg Coumadin daily. Advised to increase to 5 mg one day/wk and 2.5 mg on all other days. Will check INR next week. Will cont to monitor.     Juan Chavez

## 2024-01-25 NOTE — TELEPHONE ENCOUNTER
Spoke to pts son. Pt went to cardio yesterday and they told him he has CHF and [place] [facility] [place] some medication changes also. I made appt for your next available telehealth so he can follow up after the ECHO but it's not until the end of February. Do you want to see him sooner than that?

## 2024-01-26 ENCOUNTER — HOME CARE VISIT (OUTPATIENT)
Dept: HOME HEALTH SERVICES | Facility: HOME HEALTHCARE | Age: 89
End: 2024-01-26
Payer: MEDICARE

## 2024-01-26 VITALS
OXYGEN SATURATION: 99 % | RESPIRATION RATE: 16 BRPM | SYSTOLIC BLOOD PRESSURE: 134 MMHG | DIASTOLIC BLOOD PRESSURE: 85 MMHG | HEART RATE: 70 BPM | TEMPERATURE: 98.3 F

## 2024-01-26 PROCEDURE — G0156 HHCP-SVS OF AIDE,EA 15 MIN: HCPCS

## 2024-01-30 ENCOUNTER — OFFICE VISIT (OUTPATIENT)
Dept: CARDIOLOGY | Facility: CLINIC | Age: 89
End: 2024-01-30
Payer: MEDICARE

## 2024-01-30 ENCOUNTER — OUTSIDE FACILITY SERVICE (OUTPATIENT)
Dept: CARDIOLOGY | Facility: CLINIC | Age: 89
End: 2024-01-30
Payer: MEDICARE

## 2024-01-30 ENCOUNTER — HOME CARE VISIT (OUTPATIENT)
Dept: HOME HEALTH SERVICES | Facility: HOME HEALTHCARE | Age: 89
End: 2024-01-30
Payer: MEDICARE

## 2024-01-30 VITALS
BODY MASS INDEX: 25.6 KG/M2 | DIASTOLIC BLOOD PRESSURE: 64 MMHG | RESPIRATION RATE: 16 BRPM | SYSTOLIC BLOOD PRESSURE: 118 MMHG | HEIGHT: 72 IN | HEART RATE: 54 BPM | OXYGEN SATURATION: 98 % | WEIGHT: 189 LBS

## 2024-01-30 VITALS
TEMPERATURE: 97.7 F | HEART RATE: 62 BPM | DIASTOLIC BLOOD PRESSURE: 73 MMHG | SYSTOLIC BLOOD PRESSURE: 121 MMHG | RESPIRATION RATE: 18 BRPM | OXYGEN SATURATION: 100 %

## 2024-01-30 DIAGNOSIS — I48.92 ATRIAL FLUTTER WITH CONTROLLED RESPONSE: ICD-10-CM

## 2024-01-30 DIAGNOSIS — I35.0 AORTIC STENOSIS, SEVERE: Primary | ICD-10-CM

## 2024-01-30 PROCEDURE — G0495 RN CARE TRAIN/EDU IN HH: HCPCS

## 2024-01-30 RX ORDER — BUMETANIDE 2 MG/1
2 TABLET ORAL DAILY
Qty: 90 TABLET | Refills: 3 | Status: SHIPPED | OUTPATIENT
Start: 2024-01-30

## 2024-01-30 NOTE — ASSESSMENT & PLAN NOTE
Patient atrial flutter on echo today. New onset.  Rate controlled spontaneously on patient current medical therapy, suggestive of AV jack disease.  Blood pressure controlled.  Plan:  Continue Eliquis 2.5 mg 1 tab twice daily  Apixaban factor Xa activity levels next visit  Consider cardioversion if patient symptomatic at follow-up; hold off for now until management of severe aortic stenosis.  Cardioversion for symptomatic relief a consideration after above.

## 2024-01-30 NOTE — HOME HEALTH
Routine Visit Note:    Skill/education provided: CP assessment completed. VSS. Educated on elevation of legs to prevent edema. Pt wound on left ankle has opened back up. SN called [facility] wound clinic who want to see him again. Son made aware. Pt had echo today as well and states cardiology says pt needs new stent placement. Pt to have consult w/ Valve team.     Patient/caregiver response: Pt verbalized understanding    Plan for next visit: CP assessment/wound assessment    Other pertinent info:

## 2024-01-30 NOTE — ASSESSMENT & PLAN NOTE
Patient with known aortic stenosis mild to moderate on echo from 2019.  Symptoms for the last few weeks compatible with CHF, with shortness of breath on mild daily activities, sometimes at rest, significant volume overload, cardiac cirrhosis, decreased exercise capacity.  Exam suggestive of very severe aortic stenosis, with pulses parvus at tardus, decreased overall peripheral pulses and neck radiation.  Echo done today confirming the diagnosis.  Official report to follow.  Plan:  Extensive advance care discussion with family in view of onset of symptoms November 2023, and usually poor prognosis with median survival of 2 years, if no intervention done.  Risk benefits of TAVR discussed with patient and explained to family.  Patient and family very hesitant regarding how to move forward.  All questions answered.  Before leaving the clinic, family agreed to meet with the surgeon.  Continue bumetanide 2 mg daily  Labs next visit  If patient/family decide not to move forward with TAVR, we will try to optimize medical therapy to palliate CHF symptoms, with close follow-ups/labs.

## 2024-01-30 NOTE — PROGRESS NOTES
[facility] [place]  [facility] CARDIOLOGY  [address] [place] [address]  Dept: [phone]  Dept Fax: [phone]    Date: 01/30/2024  Patient: Onel Clark  YOB: 1933    Follow Up Office Visit Note    Interval Follow-up  Mr. Onel Clark is a 89 y.o. male who is here for follow-up on Congestive Heart Failure (Severe aortic stenosis).    Subjective   Patient doing the same.  No improvement since last visit despite hydrating dose.  No change in weight.  Still complaining of shortness of breath and weakness, as well as leg swelling and ascites.  He had his echo done today at [facility].  No angina, no syncope, no change exercise capacity.    The following portions of the patient's history were reviewed and updated as appropriate: allergies, current medications, past family history, past medical history, past social history, past surgical history, and problem list.    Medications: No Known Allergies   Current Outpatient Medications   Medication Instructions    amLODIPine (NORVASC) 5 MG tablet TAKE 1 TABLET BY MOUTH EVERY DAY    apixaban (ELIQUIS) 2.5 mg, Oral, 2 Times Daily    ASPIRIN 81 PO 81 mg, Oral, Daily    atorvastatin (LIPITOR) 80 mg, Oral, Every Night at Bedtime    bumetanide (BUMEX) 2 mg, Oral, Daily    cephalexin (KEFLEX) 500 mg, Oral, 3 Times Daily    Cetirizine HCl 10 mg, Oral, Daily With Breakfast    levothyroxine (SYNTHROID, LEVOTHROID) 137 mcg, Oral, Daily    losartan (COZAAR) 50 mg, Oral, Daily    memantine (NAMENDA) 5 MG tablet Take 1 tablet by mouth twice daily    metFORMIN (GLUCOPHAGE) 1,000 mg, Oral, 2 Times Daily With Meals    omeprazole (PRILOSEC) 40 mg, Oral, Daily    spironolactone (ALDACTONE) 25 mg, Oral, Daily       Tobacco Use: Medium Risk (1/30/2024)    Patient History     Smoking Tobacco Use: Former     Smokeless Tobacco Use: Never     Passive Exposure: Past        Objective  Vitals:    01/30/24 1326   BP: 118/64   BP Location: Right arm  "  Patient Position: Sitting   Cuff Size: Adult   Pulse: 54   Resp: 16   SpO2: 98%   Weight: 85.7 kg (189 lb)   Height: 182.9 cm (72.01\")      Vitals:    01/30/24 1326   BP: 118/64   BP Location: Right arm   Patient Position: Sitting   Cuff Size: Adult   Pulse: 54   Resp: 16   SpO2: 98%   Weight: 85.7 kg (189 lb)   Height: 182.9 cm (72.01\")          Physical Exam  Constitutional:       Appearance: Healthy appearance. Not in distress.   Eyes:      Pupils: Pupils are equal, round, and reactive to light.   Neck:      Vascular: No JVR. JVD normal.   Pulmonary:      Effort: Pulmonary effort is normal.      Breath sounds: Normal breath sounds. No wheezing. No rhonchi. No rales.   Chest:      Chest wall: Not tender to palpatation.   Cardiovascular:      PMI at left midclavicular line. Normal rate. Regular rhythm. Normal S1 with normal intensity. Normal S2 with normal intensity.       Murmurs: There is no murmur.      No gallop.  No click. No rub.      Comments: Partially pitting bilateral extremity edema  Pulses:     Intact distal pulses.   Edema:     Peripheral edema present.     Ankle: bilateral 1+ edema of the ankle.     Feet: bilateral 2+ edema of the feet.  Abdominal:      General: There is no abdominal bruit.   Skin:     General: Skin is warm.   Neurological:      Mental Status: Alert and oriented to person, place and time.          Diagnostic Data  Lab Results   Component Value Date     01/22/2024    K 4.2 01/22/2024     01/22/2024    CO2 22.0 01/22/2024    BUN 20 01/22/2024    CREATININE 1.34 (H) 01/22/2024    CALCIUM 7.8 (L) 01/22/2024    BILITOT 0.7 01/22/2024    ALKPHOS 162 (H) 01/22/2024    ALT 11 01/22/2024    AST 21 01/22/2024    GLUCOSE 118 (H) 01/22/2024    ALBUMIN 3.2 (L) 01/22/2024     Lab Results   Component Value Date    WBC 10.29 01/22/2024    HGB 10.8 (L) 01/22/2024    HCT 32.9 (L) 01/22/2024     (H) 01/22/2024     Lab Results   Component Value Date    INR 1.12 01/22/2024    INR 1.08 " "01/22/2024     No results found for: \"CKTOTAL\", \"TROPONINI\", \"TROPONINT\", \"CKMBINDEX\", \"BNP\"  Lab Results   Component Value Date    PROBNP 7,041.0 (H) 01/22/2024       Lab Results   Component Value Date    CHLPL 112 06/16/2023    TRIG 83 06/16/2023    HDL 49 06/16/2023    LDL 47 06/16/2023     Lab Results   Component Value Date    TSH 2.090 01/26/2023       CV Diagnostics:  Procedures    CXR: No results found for this or any previous visit.     ECHO/MUGA: No results found for this or any previous visit.     STRESS TESTS: No results found for this or any previous visit.     CARDIAC CATH: No results found for this or any previous visit.     DEVICES: No valid procedures specified.   HOLTER: No results found for this or any previous visit.     CT/MRI:  Results for orders placed in visit on 12/04/23    CT Chest Without Contrast Diagnostic    Narrative  CT CHEST WO CONTRAST DIAGNOSTIC    Date of Exam: 12/4/2023 2:16 PM EST    Indication: Abnormal chest x-ray, 6 cm left lower lung mass with pleural thickening versus an effusion.    Comparison: Chest x-ray performed on 11/15/2023.    Technique: Axial CT images were obtained of the chest without contrast administration.  Reconstructed coronal and sagittal images were also obtained. Automated exposure control and iterative construction methods were used.      Findings:  There is a left lower lobe volume loss with a peripheral round area of consolidation versus a mass measuring 2.3 x 4.9 cm on image 42 of series 4. Round atelectasis is a possibility. I cannot exclude a neoplasm. I would recommend a PET/CT exam for  further assessment. There are bandlike linear densities in the left lower lobe, lingula, and to a lesser degree the right lower lobe consistent with scarring versus subsegmental atelectasis. There is a small left pleural effusion. There is underlying  pleural thickening. There is a trace right pleural effusion. There is also a trace pericardial effusion with " pericardial calcifications. There is suggestion of a 6 mm nodule in the right lower lobe adjacent to the hemidiaphragm on image 42 of series 4.  There are no enlarged mediastinal lymph nodes. There is no definite hilar adenopathy. There are atherosclerotic vascular calcifications which includes involvement of the coronary arteries. Within the upper abdomen, there is moderate ascites. The liver  has a cirrhotic appearance. There is a partially imaged round mass in the upper pole of the right kidney which probably represents a cyst. There are no suspicious osteolytic or sclerotic lesions in the bony structures. The patient has underlying  degenerative changes.    Impression  Impression:    1. Left lower lobe volume loss with a peripheral round area of consolidation versus a mass in the left lower lobe measuring 2.3 x 4.9 cm. Diagnostic considerations would include round atelectasis versus a neoplasm. I would recommend a PET/CT exam for  further assessment.  2. Suggestion of a 6 mm nodule in the right lower lobe adjacent to the hemidiaphragm.  3. There is a small left pleural effusion with pleural thickening. There is a trace right pleural effusion. There is also a trace pericardial effusion with pericardial calcifications.  4. Scarring versus subsegmental atelectasis in the left lower lobe, lingula, and to a lesser degree the right lower lobe.  5. Moderate ascites in the upper abdomen.  6. Cirrhotic liver.  7. Additional findings as noted above.        Electronically Signed: Joby Salmon MD  12/6/2023 10:05 AM EST  Workstation ID: [id number]    VASCULAR: No valid procedures specified.     Assessment and Plan  Diagnoses and all orders for this visit:    1. Aortic stenosis, severe (Primary)  Assessment & Plan:  Patient with known aortic stenosis mild to moderate on echo from 2019.  Symptoms for the last few weeks compatible with CHF, with shortness of breath on mild daily activities, sometimes at rest, significant volume  overload, cardiac cirrhosis, decreased exercise capacity.  Exam suggestive of very severe aortic stenosis, with pulses parvus at tardus, decreased overall peripheral pulses and neck radiation.  Echo done today confirming the diagnosis.  Official report to follow.  Plan:  Extensive advance care discussion with family in view of onset of symptoms November 2023, and usually poor prognosis with median survival of 2 years, if no intervention done.  Risk benefits of TAVR discussed with patient and explained to family.  Patient and family very hesitant regarding how to move forward.  All questions answered.  Before leaving the clinic, family agreed to meet with the surgeon.  Continue bumetanide 2 mg daily  Labs next visit  If patient/family decide not to move forward with TAVR, we will try to optimize medical therapy to palliate CHF symptoms, with close follow-ups/labs.    Orders:  -     bumetanide (BUMEX) 2 MG tablet; Take 1 tablet by mouth Daily.  Dispense: 90 tablet; Refill: 3  -     Ambulatory Referral to Cardiothoracic Surgery    2. Atrial flutter with controlled response         Return in about 1 month (around 2/29/2024) for Follow-up with Dr Shannon.    There are no Patient Instructions on file for this visit.    Oscar Shannon MD

## 2024-01-31 ENCOUNTER — DOCUMENTATION (OUTPATIENT)
Dept: CARDIOLOGY | Facility: HOSPITAL | Age: 89
End: 2024-01-31
Payer: MEDICARE

## 2024-01-31 ENCOUNTER — HOME CARE VISIT (OUTPATIENT)
Dept: HOME HEALTH SERVICES | Facility: HOME HEALTHCARE | Age: 89
End: 2024-01-31
Payer: MEDICARE

## 2024-01-31 VITALS
OXYGEN SATURATION: 100 % | TEMPERATURE: 98.2 F | HEART RATE: 68 BPM | RESPIRATION RATE: 16 BRPM | SYSTOLIC BLOOD PRESSURE: 128 MMHG | DIASTOLIC BLOOD PRESSURE: 78 MMHG

## 2024-01-31 PROCEDURE — G0156 HHCP-SVS OF AIDE,EA 15 MIN: HCPCS

## 2024-01-31 NOTE — PROGRESS NOTES
Referral received from Dr. Shannon for severe aortic stenosis and TAVR evaluation. Called patient to discuss, spoke with son. They requested to discuss with CT surgery/Dr. Brandt prior to proceeding with TAVR workup. Agreeable to have information mailed to listed address. Educational folder mailed today, my contact information is included. Scheduling notified of need for apt with Dr. Brandt.

## 2024-02-01 ENCOUNTER — TELEMEDICINE (OUTPATIENT)
Dept: FAMILY MEDICINE CLINIC | Facility: CLINIC | Age: 89
End: 2024-02-01
Payer: MEDICARE

## 2024-02-01 DIAGNOSIS — I35.0 AORTIC STENOSIS, SEVERE: Primary | ICD-10-CM

## 2024-02-01 DIAGNOSIS — R60.9 EDEMA, UNSPECIFIED TYPE: ICD-10-CM

## 2024-02-01 DIAGNOSIS — I50.21 ACUTE SYSTOLIC CHF (CONGESTIVE HEART FAILURE): ICD-10-CM

## 2024-02-01 PROCEDURE — 99213 OFFICE O/P EST LOW 20 MIN: CPT | Performed by: FAMILY MEDICINE

## 2024-02-02 NOTE — PROGRESS NOTES
Mode of Visit: Video  Location of patient: home  You have chosen to receive care through a telehealth visit.  The patient has signed the video visit consent form.  The visit included audio and video interaction. No technical issues occurred during this visit.     Chief Complaint  No chief complaint on file.      Onel Clark presents to [facility]    Patient seen today at home with family.  Reviewed medical records.  Reviewed cardiology evaluation.  Aortic stenosis.  Somewhat stable but with lots of swelling still.  Medication adjustments reviewed.      Review of Systems   Constitutional:  Positive for fatigue. Negative for fever.   HENT:  Negative for congestion and ear pain.    Respiratory:  Negative for apnea, cough, chest tightness and shortness of breath.    Cardiovascular:  Positive for leg swelling. Negative for chest pain.   Gastrointestinal:  Negative for abdominal pain, constipation, diarrhea and nausea.   Musculoskeletal:  Negative for arthralgias.   Psychiatric/Behavioral:  Negative for depressed mood and stress.        Objective   Vital Signs:   There were no vitals taken for this visit.    Physical Exam   Constitutional: He appears well-developed and well-nourished.   Psychiatric: He has a normal mood and affect.                    Assessment and Plan    Diagnoses and all orders for this visit:    1. Aortic stenosis, severe (Primary)    2. Acute systolic CHF (congestive heart failure)    3. Edema, unspecified type        No changes in current treatment.  Upcoming appointment with Dr. Brandt with vascular surgery.  Possible aortic valve replacement.  Medication adjustments reviewed.  Cardiology has made medication adjustments on diuretic therapy.    Follow Up   No follow-ups on file.  Patient was given instructions and counseling regarding his condition or for health maintenance advice. Please see specific information pulled into the AVS if appropriate.

## 2024-02-05 ENCOUNTER — HOME CARE VISIT (OUTPATIENT)
Dept: HOME HEALTH SERVICES | Facility: HOME HEALTHCARE | Age: 89
End: 2024-02-05
Payer: MEDICARE

## 2024-02-05 VITALS
RESPIRATION RATE: 18 BRPM | SYSTOLIC BLOOD PRESSURE: 122 MMHG | DIASTOLIC BLOOD PRESSURE: 68 MMHG | OXYGEN SATURATION: 98 % | HEART RATE: 44 BPM | TEMPERATURE: 97.8 F

## 2024-02-05 PROCEDURE — G0299 HHS/HOSPICE OF RN EA 15 MIN: HCPCS

## 2024-02-07 ENCOUNTER — HOME CARE VISIT (OUTPATIENT)
Dept: HOME HEALTH SERVICES | Facility: HOME HEALTHCARE | Age: 89
End: 2024-02-07
Payer: MEDICARE

## 2024-02-07 VITALS
SYSTOLIC BLOOD PRESSURE: 122 MMHG | RESPIRATION RATE: 16 BRPM | DIASTOLIC BLOOD PRESSURE: 80 MMHG | TEMPERATURE: 97.6 F | HEART RATE: 66 BPM | OXYGEN SATURATION: 100 %

## 2024-02-07 VITALS
TEMPERATURE: 97.6 F | HEART RATE: 66 BPM | OXYGEN SATURATION: 100 % | RESPIRATION RATE: 16 BRPM | SYSTOLIC BLOOD PRESSURE: 122 MMHG | DIASTOLIC BLOOD PRESSURE: 80 MMHG

## 2024-02-07 PROCEDURE — G0156 HHCP-SVS OF AIDE,EA 15 MIN: HCPCS

## 2024-02-07 PROCEDURE — G0299 HHS/HOSPICE OF RN EA 15 MIN: HCPCS

## 2024-02-07 NOTE — HOME HEALTH
Routine Visit Note:    Skill/education provided:Wound care and assessment completed. Instructed on edema management.    Patient/caregiver response: pt tolerated well.    Plan for next visit: Wound care/assessment    Other pertinent info: Spoke w/ Dr. Hays office today. they will call tomorrow and schedule pt for consult. pt to see wound care on Friday due to new wound on right heel, and left big toe.

## 2024-02-07 NOTE — HOME HEALTH
Routine Visit Note:    Skill/education provided: Wound care and assessment. New wound found on right heel by son. Wound care clinic called for orders. Pulmonary assessment    Patient/caregiver response: Pt tolerated well. VSS. Lungs clear.     Plan for next visit: Wound care    Other pertinent info: Pt waiting call from Dr. Brandt for consultation

## 2024-02-09 ENCOUNTER — HOME CARE VISIT (OUTPATIENT)
Dept: HOME HEALTH SERVICES | Facility: HOME HEALTHCARE | Age: 89
End: 2024-02-09
Payer: MEDICARE

## 2024-02-09 RX ORDER — CLOPIDOGREL BISULFATE 75 MG/1
75 TABLET ORAL DAILY
Qty: 90 TABLET | Refills: 0 | Status: SHIPPED | OUTPATIENT
Start: 2024-02-09

## 2024-02-11 ENCOUNTER — PATIENT MESSAGE (OUTPATIENT)
Dept: FAMILY MEDICINE CLINIC | Facility: CLINIC | Age: 89
End: 2024-02-11
Payer: MEDICARE

## 2024-02-12 ENCOUNTER — HOME CARE VISIT (OUTPATIENT)
Dept: HOME HEALTH SERVICES | Facility: HOME HEALTHCARE | Age: 89
End: 2024-02-12
Payer: MEDICARE

## 2024-02-13 DIAGNOSIS — R41.3 MEMORY LOSS: ICD-10-CM

## 2024-02-14 ENCOUNTER — HOME CARE VISIT (OUTPATIENT)
Dept: HOME HEALTH SERVICES | Facility: HOME HEALTHCARE | Age: 89
End: 2024-02-14
Payer: MEDICARE

## 2024-02-14 VITALS
TEMPERATURE: 98 F | DIASTOLIC BLOOD PRESSURE: 60 MMHG | RESPIRATION RATE: 18 BRPM | SYSTOLIC BLOOD PRESSURE: 124 MMHG | OXYGEN SATURATION: 100 % | HEART RATE: 51 BPM

## 2024-02-14 VITALS
OXYGEN SATURATION: 100 % | RESPIRATION RATE: 16 BRPM | TEMPERATURE: 97.8 F | SYSTOLIC BLOOD PRESSURE: 135 MMHG | HEART RATE: 65 BPM | DIASTOLIC BLOOD PRESSURE: 86 MMHG

## 2024-02-14 PROCEDURE — G0156 HHCP-SVS OF AIDE,EA 15 MIN: HCPCS

## 2024-02-14 PROCEDURE — G0299 HHS/HOSPICE OF RN EA 15 MIN: HCPCS

## 2024-02-14 RX ORDER — MEMANTINE HYDROCHLORIDE 5 MG/1
5 TABLET ORAL 2 TIMES DAILY
Qty: 180 TABLET | Refills: 0 | Status: SHIPPED | OUTPATIENT
Start: 2024-02-14

## 2024-02-16 ENCOUNTER — HOME CARE VISIT (OUTPATIENT)
Dept: HOME HEALTH SERVICES | Facility: HOME HEALTHCARE | Age: 89
End: 2024-02-16
Payer: MEDICARE

## 2024-02-16 VITALS
RESPIRATION RATE: 18 BRPM | HEART RATE: 78 BPM | SYSTOLIC BLOOD PRESSURE: 112 MMHG | OXYGEN SATURATION: 99 % | DIASTOLIC BLOOD PRESSURE: 62 MMHG | TEMPERATURE: 97.8 F

## 2024-02-16 PROCEDURE — G0300 HHS/HOSPICE OF LPN EA 15 MIN: HCPCS

## 2024-02-19 ENCOUNTER — OFFICE VISIT (OUTPATIENT)
Dept: CARDIAC SURGERY | Facility: CLINIC | Age: 89
End: 2024-02-19
Payer: MEDICARE

## 2024-02-19 ENCOUNTER — HOME CARE VISIT (OUTPATIENT)
Dept: HOME HEALTH SERVICES | Facility: HOME HEALTHCARE | Age: 89
End: 2024-02-19
Payer: MEDICARE

## 2024-02-19 VITALS
OXYGEN SATURATION: 100 % | HEART RATE: 60 BPM | DIASTOLIC BLOOD PRESSURE: 68 MMHG | WEIGHT: 188 LBS | TEMPERATURE: 97.3 F | BODY MASS INDEX: 25.49 KG/M2 | SYSTOLIC BLOOD PRESSURE: 130 MMHG

## 2024-02-19 VITALS
RESPIRATION RATE: 18 BRPM | DIASTOLIC BLOOD PRESSURE: 64 MMHG | OXYGEN SATURATION: 100 % | SYSTOLIC BLOOD PRESSURE: 116 MMHG | TEMPERATURE: 97.4 F | HEART RATE: 64 BPM

## 2024-02-19 DIAGNOSIS — I35.9 AORTIC VALVE DISORDER: Primary | ICD-10-CM

## 2024-02-19 PROCEDURE — G0299 HHS/HOSPICE OF RN EA 15 MIN: HCPCS

## 2024-02-19 PROCEDURE — 99204 OFFICE O/P NEW MOD 45 MIN: CPT | Performed by: THORACIC SURGERY (CARDIOTHORACIC VASCULAR SURGERY)

## 2024-02-19 RX ORDER — MELOXICAM 7.5 MG/1
7.5 TABLET ORAL DAILY
COMMUNITY
Start: 2024-02-09

## 2024-02-19 RX ORDER — MULTIVIT WITH MINERALS/LUTEIN
250 TABLET ORAL DAILY
COMMUNITY

## 2024-02-19 NOTE — PROGRESS NOTES
02/19/2024  Patient Information  Onel ARIAS [facility]                                                                                          [address] [place] [address]   3/25/1933  'PCP/Referring Physician'  Ilir Fair MD  [phone]  Oscar Shannon MD  [phone]  Chief Complaint   Patient presents with    Cardiac Valve Problem     Referred by Dr. Oscar Shannon and EMMETT Flores for TAVR evaluation. Patient has fatigue and weakness.       History of Present Illness:   The patient is a 89-year-old male former smoker who was referred to me to evaluate for transcatheter valve replacement.  He has seen no other physicians on the transcatheter valve team but me.  His echocardiogram demonstrates significant aortic valvular stenosis but this patient has a number of comorbidities.  He is extremely deconditioned and can only walk around some rooms of his house.  He is unable to climb a flight of stairs.  He has cirrhosis with a long history of alcohol consumption although he has not consumed alcohol in a few months.  Recently, he had 3-1/2 L of ascites drained by paracentesis secondary to his cirrhosis.  I do not believe his cirrhosis is related to his aortic valve.      Patient Active Problem List   Diagnosis    Right leg pain    Degenerative lumbar spinal stenosis    Hereditary and idiopathic peripheral neuropathy    History of lumbar laminectomy    Type 2 diabetes mellitus without complication, without long-term current use of insulin    Primary hypertension    Aortic stenosis, severe    Atrial flutter with controlled response     Past Medical History:   Diagnosis Date    Acquired hypothyroidism     Allergic     Allergies     Arthritis     Benign prostatic hyperplasia with nocturia     Bilateral carotid artery disease     Cancer of the skin, basal cell     Cataract     CHF (congestive heart failure)     Chronic non-seasonal allergic rhinitis     DUE TO POLLEN    Chronic renal impairment      COPD (chronic obstructive pulmonary disease)     Decreased hearing of both ears     Degenerative lumbar spinal stenosis     Diabetes mellitus     Elevated PSA     Frequent falls     GERD without esophagitis     Heart murmur     High risk medication use     HL (hearing loss)     Hx of bilateral hip replacements     Hx of decompressive lumbar laminectomy     Hx of total knee replacement, bilateral     Hx of transient ischemic attack (TIA)     Hypertension     Mixed hyperlipidemia due to type 2 diabetes mellitus     Nicotine dependence     No natural teeth     FALSE TEETH    Osteoarthritis     Primary hypertension     Primary osteoarthritis involving multiple joints     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 2 diabetes mellitus     Stroke TIA  2 years ago    Thyroid disease     TIA (transient ischemic attack)     Type 2 diabetes mellitus with diabetic polyneuropathy     Type 2 diabetes mellitus with stage 2 chronic kidney disease, without long-term current use of insulin      Past Surgical History:   Procedure Laterality Date    BACK SURGERY  2007    Lumbar Laminectomy    CARPAL TUNNEL RELEASE  2012    COLONOSCOPY      EYE SURGERY  cataracts    JOINT REPLACEMENT  both hips & knee & back    REPLACEMENT TOTAL KNEE Left 2013    SPINE SURGERY  about 15 yrs ago    TOTAL HIP ARTHROPLASTY Right 2016       Current Outpatient Medications:     amLODIPine (NORVASC) 5 MG tablet, TAKE 1 TABLET BY MOUTH EVERY DAY, Disp: 90 tablet, Rfl: 1    apixaban (ELIQUIS) 2.5 MG tablet tablet, Take 1 tablet by mouth Daily., Disp: 180 tablet, Rfl: 3    ASPIRIN 81 PO, Take 81 mg by mouth Daily., Disp: , Rfl:     bumetanide (BUMEX) 2 MG tablet, Take 1 tablet by mouth Daily., Disp: 90 tablet, Rfl: 3    CALCIUM CARB-CHOLECALCIFEROL PO, Take  by mouth., Disp: , Rfl:     Cetirizine HCl 10 MG capsule, Take 10 mg by mouth Daily With Breakfast. Indications: Hayfever, Disp: , Rfl:     levothyroxine (SYNTHROID, LEVOTHROID) 137 MCG tablet, Take 1 tablet by mouth Daily.,  Disp: 90 tablet, Rfl: 1    losartan (COZAAR) 50 MG tablet, Take 1 tablet by mouth Daily., Disp: 90 tablet, Rfl: 1    meloxicam (MOBIC) 7.5 MG tablet, Take 1 tablet by mouth Daily., Disp: , Rfl:     memantine (NAMENDA) 5 MG tablet, TAKE 1 TABLET BY MOUTH 2 TIMES A DAY, Disp: 180 tablet, Rfl: 0    metFORMIN (GLUCOPHAGE) 1000 MG tablet, Take 1 tablet by mouth 2 (Two) Times a Day With Meals., Disp: 180 tablet, Rfl: 1    multivitamin with minerals (MENS MULTI VITAMIN & MINERAL PO), Take 1 tablet by mouth Daily., Disp: , Rfl:     omeprazole (priLOSEC) 40 MG capsule, TAKE 1 CAPSULE BY MOUTH EVERY DAY, Disp: 90 capsule, Rfl: 0    spironolactone (Aldactone) 25 MG tablet, Take 1 tablet by mouth Daily., Disp: 30 tablet, Rfl: 2    vitamin C (ASCORBIC ACID) 250 MG tablet, Take 1 tablet by mouth Daily., Disp: , Rfl:     atorvastatin (LIPITOR) 80 MG tablet, TAKE 1 TABLET BY MOUTH EVERY NIGHT AT BEDTIME (Patient not taking: Reported on 2024), Disp: 90 tablet, Rfl: 0    cephalexin (Keflex) 500 MG capsule, Take 1 capsule by mouth 3 (Three) Times a Day., Disp: 30 capsule, Rfl: 0  No Known Allergies  Social History     Socioeconomic History    Marital status:     Number of children: 1   Tobacco Use    Smoking status: Former     Packs/day: 1.00     Years: 30.00     Additional pack years: 0.00     Total pack years: 30.00     Types: Cigarettes     Quit date:      Years since quittin.1     Passive exposure: Past    Smokeless tobacco: Never    Tobacco comments:     None   Vaping Use    Vaping Use: Never used   Substance and Sexual Activity    Alcohol use: Yes     Alcohol/week: 1.0 standard drink of alcohol     Types: 1 Glasses of wine per week     Comment: None    Drug use: No    Sexual activity: Not Currently     Partners: Male     Comment: With  (male) until lately - old age problems     Family History   Problem Relation Age of Onset    Alzheimer's disease Mother     Diabetes Mother     Hyperlipidemia Mother      Heart attack Father     Stroke Father     Coronary artery disease Father     Hearing loss Father     Hyperlipidemia Father     Diabetes Sister     Hypertension Sister     Hyperlipidemia Sister     Arthritis Sister     Arthritis Brother     Diabetes Brother     Hearing loss Brother      Review of Systems   Constitutional: Positive for malaise/fatigue. Negative for chills, decreased appetite, diaphoresis, fever, night sweats, weight gain and weight loss.   HENT:  Negative for hoarse voice.    Eyes:  Negative for blurred vision, double vision and visual disturbance.   Cardiovascular:  Positive for dyspnea on exertion. Negative for chest pain, claudication, irregular heartbeat, leg swelling, near-syncope, orthopnea, palpitations, paroxysmal nocturnal dyspnea and syncope.   Respiratory:  Positive for cough and shortness of breath. Negative for hemoptysis, sputum production and wheezing.    Hematologic/Lymphatic: Negative for adenopathy and bleeding problem. Bruises/bleeds easily.   Skin:  Negative for color change, nail changes, poor wound healing and rash.   Musculoskeletal:  Negative for back pain, falls and muscle cramps.   Gastrointestinal:  Positive for dysphagia. Negative for abdominal pain and heartburn.   Genitourinary:  Negative for flank pain.   Neurological:  Positive for weakness. Negative for brief paralysis, disturbances in coordination, dizziness, focal weakness, headaches, light-headedness, loss of balance, numbness, paresthesias, sensory change and vertigo.   Psychiatric/Behavioral:  Negative for depression and suicidal ideas. The patient is not nervous/anxious.    Allergic/Immunologic: Negative for persistent infections.     Vitals:    02/19/24 0741   BP: 130/68   BP Location: Left arm   Patient Position: Sitting   Pulse: 60   Temp: 97.3 °F (36.3 °C)   SpO2: 100%   Weight: 85.3 kg (188 lb)      Physical Exam   CONSTITUTIONAL: Alert and conversant, Well dressed, Well nourished, No acute distress in a  wheelchair  EYES: Sclera clean, Anicteric, Pupils equal  ENT: No nasal deviation, Trachea midline.  Bilateral hearing aids  NECK: No neck masses, Supple  LUNGS: No wheezing, Cough, non-congested  HEART: No rubs, No murmurs  GASTROINTESTINAL: Soft, non-distended, No masses, Non tender  to palpation, normal bowel sounds  NEURO: No motor deficits, No sensory deficits, Cranial Nerves 2 through 12 grossly intact  PSYCHIATRIC: Oriented to person, place and time, No memory deficits, Mood appropriate  VASCULAR: No carotid bruits, Femoral pulses palpable and symmetric  MUSKULOSKELETAL: No extremity trauma or extremity asymmetry.  There is significant ankle edema bilaterally    The ROS, past medical history, surgical history, family history, social history, and vitals were reviewed by myself and corrected as needed.      Labs/Imaging:  I have reviewed the echocardiogram report. Smoking cessation was not discussed as patient is no longer smoking. The patient does have an advance directive.    Assessment/Plan:  The patient is a 89-year-old male who does have aortic valvular stenosis. However, he is an extremely poor candidate in my opinion for transcatheter valve.  He has actually improved since starting Bumex and spironolactone.  I believe his cirrhosis is related to his previous and long-term alcohol consumption and not related to his aortic valve.  In short and in discussion with him and his family, I believe continued medical management is his best option and I recommend continuing medical therapy.    Patient Active Problem List   Diagnosis    Right leg pain    Degenerative lumbar spinal stenosis    Hereditary and idiopathic peripheral neuropathy    History of lumbar laminectomy    Type 2 diabetes mellitus without complication, without long-term current use of insulin    Primary hypertension    Aortic stenosis, severe    Atrial flutter with controlled response       CC: MD Oscar Olivares MD [place]  Krishan editing for Edis Brandt MD       I, Edis Brandt MD, have read and agree with the editing done by Lauren Nickerson, .

## 2024-02-20 NOTE — HOME HEALTH
Routine Visit Note:    Skill/education provided: Wound care and assessment/cardiopulmonary assessment/medication education     Patient/caregiver response: pt tolerated well.     Plan for next visit: Wound care/CP assessment    Other pertinent info: pt saw valve team this AM and is not a candiate for surgery. Will continue w/ medication management of CHF

## 2024-02-21 ENCOUNTER — HOME CARE VISIT (OUTPATIENT)
Dept: HOME HEALTH SERVICES | Facility: HOME HEALTHCARE | Age: 89
End: 2024-02-21
Payer: MEDICARE

## 2024-02-21 VITALS
HEART RATE: 82 BPM | OXYGEN SATURATION: 100 % | SYSTOLIC BLOOD PRESSURE: 115 MMHG | RESPIRATION RATE: 16 BRPM | DIASTOLIC BLOOD PRESSURE: 87 MMHG | TEMPERATURE: 98.2 F

## 2024-02-21 PROCEDURE — G0156 HHCP-SVS OF AIDE,EA 15 MIN: HCPCS

## 2024-02-21 PROCEDURE — G0299 HHS/HOSPICE OF RN EA 15 MIN: HCPCS

## 2024-02-22 VITALS
TEMPERATURE: 98.1 F | SYSTOLIC BLOOD PRESSURE: 124 MMHG | OXYGEN SATURATION: 97 % | HEART RATE: 56 BPM | RESPIRATION RATE: 18 BRPM | DIASTOLIC BLOOD PRESSURE: 72 MMHG

## 2024-02-23 ENCOUNTER — HOME CARE VISIT (OUTPATIENT)
Dept: HOME HEALTH SERVICES | Facility: HOME HEALTHCARE | Age: 89
End: 2024-02-23
Payer: MEDICARE

## 2024-02-23 NOTE — HOME HEALTH
Routine Visit Note:    Skill/education provided: Wound care to BLE    Patient/caregiver response: pt tolerated well    Plan for next visit: Wound care and assessment    Other pertinent info: pt has wound care clinic on 2.23.24

## 2024-02-26 ENCOUNTER — HOME CARE VISIT (OUTPATIENT)
Dept: HOME HEALTH SERVICES | Facility: HOME HEALTHCARE | Age: 89
End: 2024-02-26
Payer: MEDICARE

## 2024-02-26 PROCEDURE — G0299 HHS/HOSPICE OF RN EA 15 MIN: HCPCS

## 2024-02-27 ENCOUNTER — TELEMEDICINE (OUTPATIENT)
Dept: FAMILY MEDICINE CLINIC | Facility: CLINIC | Age: 89
End: 2024-02-27
Payer: MEDICARE

## 2024-02-27 VITALS
DIASTOLIC BLOOD PRESSURE: 68 MMHG | HEART RATE: 57 BPM | OXYGEN SATURATION: 100 % | SYSTOLIC BLOOD PRESSURE: 124 MMHG | TEMPERATURE: 98.3 F

## 2024-02-27 DIAGNOSIS — R41.82 ALTERED MENTAL STATUS, UNSPECIFIED ALTERED MENTAL STATUS TYPE: ICD-10-CM

## 2024-02-27 DIAGNOSIS — I35.0 AORTIC STENOSIS, SEVERE: Primary | ICD-10-CM

## 2024-02-27 PROCEDURE — 99214 OFFICE O/P EST MOD 30 MIN: CPT | Performed by: FAMILY MEDICINE

## 2024-02-27 NOTE — HOME HEALTH
Routine Visit Note:    Skill/education provided: Wound care and assessment completed and documented in [facility]/Education on wound care to son/wife    Patient/caregiver response: pt tolerated well. Family verbalized understanding    Plan for next visit: Assess wounds/wound care    Other pertinent info: Will see wound care clinic on 3.15.24

## 2024-02-28 ENCOUNTER — HOME CARE VISIT (OUTPATIENT)
Dept: HOME HEALTH SERVICES | Facility: HOME HEALTHCARE | Age: 89
End: 2024-02-28
Payer: MEDICARE

## 2024-02-28 VITALS
SYSTOLIC BLOOD PRESSURE: 124 MMHG | RESPIRATION RATE: 16 BRPM | HEART RATE: 66 BPM | TEMPERATURE: 98.1 F | DIASTOLIC BLOOD PRESSURE: 86 MMHG | OXYGEN SATURATION: 98 %

## 2024-02-28 PROCEDURE — G0156 HHCP-SVS OF AIDE,EA 15 MIN: HCPCS

## 2024-02-28 NOTE — PROGRESS NOTES
Mode of Visit: Video  Location of patient: home  Location of provider: Office  You have chosen to receive care through a telehealth visit.  The patient has signed the video visit consent form.  The visit included audio and video interaction. No technical issues occurred during this visit.     Chief Complaint  No chief complaint on file.      Onel Clark presents to [facility]    Family wanted to discuss most recent visits with cardiothoracic surgery.  Patient with severe aortic stenosis.  Discussion was held to potentially have aortic valve repair or replacement.  Due to comorbid conditions and age not really an option.  CT surgery felt best option would be medical management.  Has upcoming appointment with cardiology in 1 to 2 days.  Reviewed 3 medications.  Has had some alteration mildly mentally.  Has had UTIs in the past.  Also with a great deal of difficulty with hearing which can contribute.  Need to recheck urine.      Review of Systems   HENT:  Positive for hearing loss.    Respiratory:  Positive for shortness of breath.    Neurological:  Positive for confusion.       Objective   Vital Signs:   There were no vitals taken for this visit.    Physical Exam   Constitutional: He appears well-developed and well-nourished.   Psychiatric: He has a normal mood and affect.                    Assessment and Plan    Diagnoses and all orders for this visit:    1. Aortic stenosis, severe (Primary)    2. Altered mental status, unspecified altered mental status type  -     POCT urinalysis dipstick, automated; Future        Will continue to follow-up with cardiology for the aortic stenosis.  Checking urine for the slight alteration mentally.  They will come and get supplies to check the urinalysis.    Follow Up   No follow-ups on file.  Patient was given instructions and counseling regarding his condition or for health maintenance advice. Please see specific information pulled into the  AVS if appropriate.

## 2024-02-29 ENCOUNTER — OFFICE VISIT (OUTPATIENT)
Dept: CARDIOLOGY | Facility: CLINIC | Age: 89
End: 2024-02-29
Payer: MEDICARE

## 2024-02-29 ENCOUNTER — HOME CARE VISIT (OUTPATIENT)
Dept: HOME HEALTH SERVICES | Facility: HOME HEALTHCARE | Age: 89
End: 2024-02-29
Payer: MEDICARE

## 2024-02-29 VITALS
DIASTOLIC BLOOD PRESSURE: 68 MMHG | HEART RATE: 58 BPM | TEMPERATURE: 97.7 F | RESPIRATION RATE: 16 BRPM | OXYGEN SATURATION: 99 % | SYSTOLIC BLOOD PRESSURE: 122 MMHG

## 2024-02-29 VITALS
SYSTOLIC BLOOD PRESSURE: 100 MMHG | OXYGEN SATURATION: 97 % | HEIGHT: 72 IN | HEART RATE: 59 BPM | RESPIRATION RATE: 16 BRPM | BODY MASS INDEX: 25.5 KG/M2 | DIASTOLIC BLOOD PRESSURE: 59 MMHG

## 2024-02-29 DIAGNOSIS — I35.0 AORTIC STENOSIS, SEVERE: Primary | ICD-10-CM

## 2024-02-29 DIAGNOSIS — R06.02 SOB (SHORTNESS OF BREATH): ICD-10-CM

## 2024-02-29 DIAGNOSIS — I48.92 ATRIAL FLUTTER WITH CONTROLLED RESPONSE: ICD-10-CM

## 2024-02-29 PROCEDURE — G0300 HHS/HOSPICE OF LPN EA 15 MIN: HCPCS

## 2024-02-29 PROCEDURE — 36415 COLL VENOUS BLD VENIPUNCTURE: CPT | Performed by: INTERNAL MEDICINE

## 2024-02-29 PROCEDURE — 99214 OFFICE O/P EST MOD 30 MIN: CPT | Performed by: INTERNAL MEDICINE

## 2024-02-29 PROCEDURE — 1159F MED LIST DOCD IN RCRD: CPT | Performed by: INTERNAL MEDICINE

## 2024-02-29 PROCEDURE — 1160F RVW MEDS BY RX/DR IN RCRD: CPT | Performed by: INTERNAL MEDICINE

## 2024-02-29 NOTE — PROGRESS NOTES
[facility] [place]  [facility] CARDIOLOGY  [address] [place] [address]  Dept: [phone]  Dept Fax: [phone]    Date: 02/29/2024  Patient: Onel Clark  YOB: 1933    Follow Up Office Visit Note    Interval Follow-up  Mr. Onel Clark is a 89 y.o. male who is here for follow-up on Aortic Stenosis (Severe, declined for TAVR, on medical therapy).    Subjective   Patient mentions feeling the same with no improvement.  Family members mention significant improvement in swelling.  Patient still wheelchair-bound.  He had a visit with CT surgery and declined for TAVR.  Still complaining of shortness of breath and weakness without angina/syncope/change exercise capacity.    The following portions of the patient's history were reviewed and updated as appropriate: allergies, current medications, past family history, past medical history, past social history, past surgical history, and problem list.    Medications: No Known Allergies   Current Outpatient Medications   Medication Instructions    amLODIPine (NORVASC) 5 MG tablet TAKE 1 TABLET BY MOUTH EVERY DAY    apixaban (ELIQUIS) 2.5 mg, Oral, Daily    ASPIRIN 81 PO 81 mg, Oral, Daily    atorvastatin (LIPITOR) 80 mg, Oral, Every Night at Bedtime    bumetanide (BUMEX) 2 mg, Oral, Daily    CALCIUM CARB-CHOLECALCIFEROL PO Oral    cephalexin (KEFLEX) 500 mg, Oral, 3 Times Daily    Cetirizine HCl 10 mg, Oral, Daily With Breakfast    levothyroxine (SYNTHROID, LEVOTHROID) 137 mcg, Oral, Daily    losartan (COZAAR) 50 mg, Oral, Daily    meloxicam (MOBIC) 7.5 mg, Oral, Daily    memantine (NAMENDA) 5 mg, Oral, 2 Times Daily    metFORMIN (GLUCOPHAGE) 1,000 mg, Oral, 2 Times Daily With Meals    multivitamin with minerals (MENS MULTI VITAMIN & MINERAL PO) 1 tablet, Oral, Daily    omeprazole (PRILOSEC) 40 mg, Oral, Daily    spironolactone (ALDACTONE) 25 mg, Oral, Daily    vitamin C (ASCORBIC ACID) 250 mg, Oral, Daily       Tobacco  "Use: Medium Risk (2/29/2024)    Patient History     Smoking Tobacco Use: Former     Smokeless Tobacco Use: Never     Passive Exposure: Past        Objective  Vitals:    02/29/24 1352   BP: 100/59   BP Location: Left arm   Patient Position: Sitting   Cuff Size: Adult   Pulse: 59   Resp: 16   SpO2: 97%   Height: 182.9 cm (72\")      Vitals:    02/29/24 1352   BP: 100/59   BP Location: Left arm   Patient Position: Sitting   Cuff Size: Adult   Pulse: 59   Resp: 16   SpO2: 97%   Height: 182.9 cm (72\")          Physical Exam  Constitutional:       Appearance: Healthy appearance. Not in distress.   Eyes:      Pupils: Pupils are equal, round, and reactive to light.   Neck:      Vascular: No JVR. JVD normal.   Pulmonary:      Effort: Pulmonary effort is normal.      Breath sounds: Decreased air movement present.      Comments: Crackle left base, normal right base  Chest:      Chest wall: Not tender to palpatation.   Cardiovascular:      PMI at left midclavicular line. Normal rate. Regular rhythm. Normal S1 with normal intensity. Normal S2 with normal intensity.       Murmurs: There is a grade 4to 3/6 harsh midsystolic murmur at the URSB, radiating to the neck.      No gallop.  No click. No rub.      Comments: 1+ bilateral lower extreme edema with poor circulation partially pitting  Pulses:     Intact distal pulses.   Edema:     Peripheral edema present.     Ankle: bilateral 1+ edema of the ankle.     Feet: bilateral 1+ edema of the feet.  Abdominal:      General: There is no abdominal bruit.   Skin:     General: Skin is warm.   Neurological:      Mental Status: Alert and oriented to person, place and time.              Diagnostic Data  Lab Results   Component Value Date     01/22/2024    K 4.2 01/22/2024     01/22/2024    CO2 22.0 01/22/2024    BUN 20 01/22/2024    CREATININE 1.34 (H) 01/22/2024    CALCIUM 7.8 (L) 01/22/2024    BILITOT 0.7 01/22/2024    ALKPHOS 162 (H) 01/22/2024    ALT 11 01/22/2024    AST 21 " "01/22/2024    GLUCOSE 118 (H) 01/22/2024    ALBUMIN 3.2 (L) 01/22/2024     Lab Results   Component Value Date    WBC 10.29 01/22/2024    HGB 10.8 (L) 01/22/2024    HCT 32.9 (L) 01/22/2024     (H) 01/22/2024     Lab Results   Component Value Date    INR 1.12 01/22/2024    INR 1.08 01/22/2024     No results found for: \"CKTOTAL\", \"TROPONINI\", \"TROPONINT\", \"CKMBINDEX\", \"BNP\"  Lab Results   Component Value Date    PROBNP 7,041.0 (H) 01/22/2024       Lab Results   Component Value Date    CHLPL 112 06/16/2023    TRIG 83 06/16/2023    HDL 49 06/16/2023    LDL 47 06/16/2023     Lab Results   Component Value Date    TSH 2.090 01/26/2023       CV Diagnostics:  Procedures    CXR: No results found for this or any previous visit.     ECHO/MUGA: ECHOCARDIOGRAM - SCAN - ADULT TRANSTHORACIC ECHO COMPLETE W/ COLOR, SPECTRAL AND CONTRAST IF NECESSARY PER PROTOCOL (01/30/2024)      STRESS TESTS: No results found for this or any previous visit.     CARDIAC CATH: No results found for this or any previous visit.     DEVICES: No valid procedures specified.   HOLTER: No results found for this or any previous visit.     CT/MRI:  Results for orders placed in visit on 12/04/23    CT Chest Without Contrast Diagnostic    Narrative  CT CHEST WO CONTRAST DIAGNOSTIC    Date of Exam: 12/4/2023 2:16 PM EST    Indication: Abnormal chest x-ray, 6 cm left lower lung mass with pleural thickening versus an effusion.    Comparison: Chest x-ray performed on 11/15/2023.    Technique: Axial CT images were obtained of the chest without contrast administration.  Reconstructed coronal and sagittal images were also obtained. Automated exposure control and iterative construction methods were used.      Findings:  There is a left lower lobe volume loss with a peripheral round area of consolidation versus a mass measuring 2.3 x 4.9 cm on image 42 of series 4. Round atelectasis is a possibility. I cannot exclude a neoplasm. I would recommend a PET/CT exam " for  further assessment. There are bandlike linear densities in the left lower lobe, lingula, and to a lesser degree the right lower lobe consistent with scarring versus subsegmental atelectasis. There is a small left pleural effusion. There is underlying  pleural thickening. There is a trace right pleural effusion. There is also a trace pericardial effusion with pericardial calcifications. There is suggestion of a 6 mm nodule in the right lower lobe adjacent to the hemidiaphragm on image 42 of series 4.  There are no enlarged mediastinal lymph nodes. There is no definite hilar adenopathy. There are atherosclerotic vascular calcifications which includes involvement of the coronary arteries. Within the upper abdomen, there is moderate ascites. The liver  has a cirrhotic appearance. There is a partially imaged round mass in the upper pole of the right kidney which probably represents a cyst. There are no suspicious osteolytic or sclerotic lesions in the bony structures. The patient has underlying  degenerative changes.    Impression  Impression:    1. Left lower lobe volume loss with a peripheral round area of consolidation versus a mass in the left lower lobe measuring 2.3 x 4.9 cm. Diagnostic considerations would include round atelectasis versus a neoplasm. I would recommend a PET/CT exam for  further assessment.  2. Suggestion of a 6 mm nodule in the right lower lobe adjacent to the hemidiaphragm.  3. There is a small left pleural effusion with pleural thickening. There is a trace right pleural effusion. There is also a trace pericardial effusion with pericardial calcifications.  4. Scarring versus subsegmental atelectasis in the left lower lobe, lingula, and to a lesser degree the right lower lobe.  5. Moderate ascites in the upper abdomen.  6. Cirrhotic liver.  7. Additional findings as noted above.        Electronically Signed: Joby Salmon MD  12/6/2023 10:05 AM EST  Workstation ID: [id number]    VASCULAR: No  "valid procedures specified.     Assessment and Plan  Diagnoses and all orders for this visit:    1. Aortic stenosis, severe (Primary)  Assessment & Plan:  Patient with known critical aortic stenosis on medical therapy, recently declined to TAVR by CT surgery team at [facility].  Patient's symptoms essentially unchanged although objective evidence of improvement in volume status.  Patient appears to be less than satisfied with the outcome of the referral, wanting \"something to be done to make him feel better\".  We discussed the risk and benefits of procedure with the patient, he also mentioned he wanted some \"guarantees that he will get better not worse\".  Open discussion with patient and family regarding risk benefits of intervention versus medical management, and that there is no right or wrong answer, and ultimately the decision comes down to him and the CT surgery team.  Discussed about involving palliative care early on in the process in case he decides to proceed with medical therapy, to support with his symptoms, if the disease progresses.  Family agreeable.  Patient interested in wanting to change things from the way they are now, so offered second opinion with [facility] TAVR team.  BP and heart rate to goal, with no room for additional medical therapy.  Plan:  To discuss with the valve team at [facility] about feasibility of TAVR for this patient after reviewing the medical records - patient and family agreeable  Blood work today to assess objectively CHF control  Follow-up with patient on above discussion prior to next visit    Orders:  -     CBC & Differential  -     proBNP  -     Comprehensive Metabolic Panel  -     Magnesium    2. Atrial flutter with controlled response  Assessment & Plan:  Patient atrial flutter on echo today. Rate controlled spontaneously on patient current medical therapy, suggestive of AV jack disease.  Blood pressure controlled.  Plan:  Continue Eliquis 2.5 mg 1 tab twice " daily  Apixaban factor Xa activity levels next visit  Consider cardioversion if patient symptomatic at follow-up and declined for TAVR by the [facility] valve team    Orders:  -     Apixaban Anti-Xa    3. SOB (shortness of breath)  Assessment & Plan:  Related to severe aortic stenosis.  Check proBNP today to assess CHF control.    Orders:  -     proBNP         Return in about 2 months (around 4/29/2024) for Follow-up with Dr Shannon.    There are no Patient Instructions on file for this visit.    Oscar Shannon MD

## 2024-02-29 NOTE — HOME HEALTH
Routine Visit Note: LPN    Skill/education provided: wound care /assessment to BLe, v/s wdl, no falls reported, reviewed medications no changes reported. pt tolerated procedure well.

## 2024-02-29 NOTE — PROGRESS NOTES
Venipuncture Blood Specimen Collection  Venipuncture performed in clinic by Meryl Chatman MA with good hemostasis. Patient tolerated the procedure well without complications.   02/29/24   Meryl Chatman MA

## 2024-03-01 LAB
ALBUMIN SERPL-MCNC: 3.7 G/DL (ref 3.6–4.6)
ALBUMIN/GLOB SERPL: 1.3 {RATIO} (ref 1.2–2.2)
ALP SERPL-CCNC: 220 IU/L (ref 44–121)
ALT SERPL-CCNC: 12 IU/L (ref 0–44)
AST SERPL-CCNC: 17 IU/L (ref 0–40)
BASOPHILS # BLD AUTO: 0.1 X10E3/UL (ref 0–0.2)
BASOPHILS NFR BLD AUTO: 1 %
BILIRUB SERPL-MCNC: 0.5 MG/DL (ref 0–1.2)
BUN SERPL-MCNC: 29 MG/DL (ref 10–36)
BUN/CREAT SERPL: 15 (ref 10–24)
CALCIUM SERPL-MCNC: 8.4 MG/DL (ref 8.6–10.2)
CHLORIDE SERPL-SCNC: 97 MMOL/L (ref 96–106)
CO2 SERPL-SCNC: 25 MMOL/L (ref 20–29)
CREAT SERPL-MCNC: 1.9 MG/DL (ref 0.76–1.27)
EGFRCR SERPLBLD CKD-EPI 2021: 33 ML/MIN/1.73
EOSINOPHIL # BLD AUTO: 0.2 X10E3/UL (ref 0–0.4)
EOSINOPHIL NFR BLD AUTO: 2 %
ERYTHROCYTE [DISTWIDTH] IN BLOOD BY AUTOMATED COUNT: 14 % (ref 11.6–15.4)
GLOBULIN SER CALC-MCNC: 2.8 G/DL (ref 1.5–4.5)
GLUCOSE SERPL-MCNC: 179 MG/DL (ref 70–99)
HCT VFR BLD AUTO: 34.4 % (ref 37.5–51)
HGB BLD-MCNC: 11.6 G/DL (ref 13–17.7)
IMM GRANULOCYTES # BLD AUTO: 0 X10E3/UL (ref 0–0.1)
IMM GRANULOCYTES NFR BLD AUTO: 0 %
LYMPHOCYTES # BLD AUTO: 2.5 X10E3/UL (ref 0.7–3.1)
LYMPHOCYTES NFR BLD AUTO: 26 %
MAGNESIUM SERPL-MCNC: 0.9 MG/DL (ref 1.6–2.3)
MCH RBC QN AUTO: 30.4 PG (ref 26.6–33)
MCHC RBC AUTO-ENTMCNC: 33.7 G/DL (ref 31.5–35.7)
MCV RBC AUTO: 90 FL (ref 79–97)
MONOCYTES # BLD AUTO: 0.7 X10E3/UL (ref 0.1–0.9)
MONOCYTES NFR BLD AUTO: 7 %
NEUTROPHILS # BLD AUTO: 6.1 X10E3/UL (ref 1.4–7)
NEUTROPHILS NFR BLD AUTO: 64 %
NT-PROBNP SERPL-MCNC: 8094 PG/ML (ref 0–486)
PLATELET # BLD AUTO: 443 X10E3/UL (ref 150–450)
POTASSIUM SERPL-SCNC: 5.5 MMOL/L (ref 3.5–5.2)
PROT SERPL-MCNC: 6.5 G/DL (ref 6–8.5)
RBC # BLD AUTO: 3.82 X10E6/UL (ref 4.14–5.8)
SODIUM SERPL-SCNC: 138 MMOL/L (ref 134–144)
WBC # BLD AUTO: 9.7 X10E3/UL (ref 3.4–10.8)

## 2024-03-04 ENCOUNTER — HOME CARE VISIT (OUTPATIENT)
Dept: HOME HEALTH SERVICES | Facility: HOME HEALTHCARE | Age: 89
End: 2024-03-04
Payer: MEDICARE

## 2024-03-04 VITALS
OXYGEN SATURATION: 100 % | RESPIRATION RATE: 16 BRPM | HEART RATE: 78 BPM | TEMPERATURE: 97.4 F | DIASTOLIC BLOOD PRESSURE: 72 MMHG | SYSTOLIC BLOOD PRESSURE: 128 MMHG

## 2024-03-04 DIAGNOSIS — R06.02 SOB (SHORTNESS OF BREATH): Primary | ICD-10-CM

## 2024-03-04 PROCEDURE — G0299 HHS/HOSPICE OF RN EA 15 MIN: HCPCS

## 2024-03-06 ENCOUNTER — HOME CARE VISIT (OUTPATIENT)
Dept: HOME HEALTH SERVICES | Facility: HOME HEALTHCARE | Age: 89
End: 2024-03-06
Payer: MEDICARE

## 2024-03-06 VITALS
HEART RATE: 68 BPM | SYSTOLIC BLOOD PRESSURE: 116 MMHG | TEMPERATURE: 98.2 F | RESPIRATION RATE: 16 BRPM | OXYGEN SATURATION: 100 % | DIASTOLIC BLOOD PRESSURE: 64 MMHG

## 2024-03-06 PROCEDURE — G0156 HHCP-SVS OF AIDE,EA 15 MIN: HCPCS

## 2024-03-06 NOTE — HOME HEALTH
60 Day Summary  Home Health need continues for: Wound care and assessment of wounds to BLE  Primary diagnoses/co-morbidities/recent procedures in past 60 days that impact current episode:   Current level of functional ability: pt using walker for ambulation w/ modA  Homebound status and living arrangements: pt lives in one story home w/ spouse. Son takes to appts and does daily wound care in SN absence  Goals accomplished and/or measurable progress toward unmet goals in past 60 days:   Focus of care for next 60 days for each discipline ordered: Wound care to BLE  Skin integrity/wound status: Javier = 14  Estimated date when home care services will end 60 days  SDOH changes/barriers (i.e. Caregiver availability, social isolation, environment, income, transportation access, food insecurity etc.)  Need for MSW referral? N  Plan for next visit Wound care/assessment to BLE

## 2024-03-07 ENCOUNTER — TELEPHONE (OUTPATIENT)
Dept: CARDIOLOGY | Facility: CLINIC | Age: 89
End: 2024-03-07
Payer: MEDICARE

## 2024-03-07 ENCOUNTER — TELEPHONE (OUTPATIENT)
Dept: FAMILY MEDICINE CLINIC | Facility: CLINIC | Age: 89
End: 2024-03-07
Payer: MEDICARE

## 2024-03-07 ENCOUNTER — HOME CARE VISIT (OUTPATIENT)
Dept: HOME HEALTH SERVICES | Facility: HOME HEALTHCARE | Age: 89
End: 2024-03-07
Payer: MEDICARE

## 2024-03-07 DIAGNOSIS — E03.9 ACQUIRED HYPOTHYROIDISM: ICD-10-CM

## 2024-03-07 DIAGNOSIS — I10 ESSENTIAL (PRIMARY) HYPERTENSION: ICD-10-CM

## 2024-03-07 PROCEDURE — G0299 HHS/HOSPICE OF RN EA 15 MIN: HCPCS

## 2024-03-07 RX ORDER — LEVOTHYROXINE SODIUM 137 UG/1
137 TABLET ORAL EVERY MORNING
Qty: 90 TABLET | Refills: 0 | Status: SHIPPED | OUTPATIENT
Start: 2024-03-07

## 2024-03-07 RX ORDER — AMLODIPINE BESYLATE 5 MG/1
TABLET ORAL
Qty: 90 TABLET | Refills: 0 | Status: SHIPPED | OUTPATIENT
Start: 2024-03-07

## 2024-03-07 RX ORDER — LOSARTAN POTASSIUM 50 MG/1
50 TABLET ORAL DAILY
Qty: 90 TABLET | Refills: 0 | Status: SHIPPED | OUTPATIENT
Start: 2024-03-07

## 2024-03-07 NOTE — TELEPHONE ENCOUNTER
Spoke with Mr. Clark's son, Jose Juan, and advised that the [facility] where the field nurse took his blood to be analyzed call Dr. Shannon to advise of a critical Magnesium level of 0.9.  Mirtha advised and wanted me to let you know your father needs to go to the closest ER where they can treat this and give him a magnesium infusion.  We discussed the option of calling 911 is difficult to transport. Son verbalized understanding

## 2024-03-07 NOTE — TELEPHONE ENCOUNTER
"Leticia with [facility] called wanting to inform Dr. Fair that patient had a critical magnesium lab result.   The labs are under \"media\" in the chart.   Please advise thank you!  "

## 2024-03-08 ENCOUNTER — TELEPHONE (OUTPATIENT)
Dept: CARDIOLOGY | Facility: CLINIC | Age: 89
End: 2024-03-08
Payer: MEDICARE

## 2024-03-08 VITALS — HEART RATE: 83 BPM | TEMPERATURE: 97.8 F | OXYGEN SATURATION: 100 %

## 2024-03-08 NOTE — TELEPHONE ENCOUNTER
Leticia called from [facility] said we can fax lab orders to [phone], to be drawn by the field nurse. For any questions or concerns call [phone].

## 2024-03-10 NOTE — HOME HEALTH
Routine Visit Note:    Skill/education provided: Wound care/labs to [facility]    Patient/caregiver response: pt tolerated well    Plan for next visit: wound care    Other pertinent info:

## 2024-03-11 ENCOUNTER — HOME CARE VISIT (OUTPATIENT)
Dept: HOME HEALTH SERVICES | Facility: HOME HEALTHCARE | Age: 89
End: 2024-03-11
Payer: MEDICARE

## 2024-03-11 ENCOUNTER — TELEPHONE (OUTPATIENT)
Dept: CARDIOLOGY | Facility: CLINIC | Age: 89
End: 2024-03-11
Payer: MEDICARE

## 2024-03-11 VITALS
RESPIRATION RATE: 16 BRPM | HEART RATE: 62 BPM | OXYGEN SATURATION: 100 % | SYSTOLIC BLOOD PRESSURE: 138 MMHG | TEMPERATURE: 97.8 F | DIASTOLIC BLOOD PRESSURE: 64 MMHG

## 2024-03-11 PROCEDURE — G0299 HHS/HOSPICE OF RN EA 15 MIN: HCPCS

## 2024-03-11 PROCEDURE — G0156 HHCP-SVS OF AIDE,EA 15 MIN: HCPCS

## 2024-03-11 NOTE — TELEPHONE ENCOUNTER
"Dr Shannon recently took pt off of arthritis medication and it has been very difficult on the patient and  \"has really slowed him down.\" Son wants to know if there is something else the pt can take because this is drastically affecting him. Pt's home health suggested Gabapentin.  "

## 2024-03-12 VITALS
TEMPERATURE: 98.1 F | SYSTOLIC BLOOD PRESSURE: 110 MMHG | HEART RATE: 60 BPM | OXYGEN SATURATION: 95 % | DIASTOLIC BLOOD PRESSURE: 60 MMHG | RESPIRATION RATE: 18 BRPM

## 2024-03-12 NOTE — HOME HEALTH
Routine Visit Note:    Skill/education provided: Wound care/assessment/labs to [facility]    Patient/caregiver response: pt tolerated well    Plan for next visit: wound care    Other pertinent info:

## 2024-03-13 ENCOUNTER — TELEPHONE (OUTPATIENT)
Dept: CARDIOLOGY | Facility: CLINIC | Age: 89
End: 2024-03-13
Payer: MEDICARE

## 2024-03-13 NOTE — TELEPHONE ENCOUNTER
----- Message from Oscar Shannon MD sent at 3/13/2024  7:50 AM EDT -----  Please inform patient that his Blood work looks much better. He can continue oral supplementation of magnesium oxide 400 mg 1 tab twice daily; it is an over-the-counter medication. He would need a repeat blood work in 4 weeks.   He can get it at [facility] or walk-in to the clinic between 8 and 11.Thank you!

## 2024-03-14 ENCOUNTER — HOME CARE VISIT (OUTPATIENT)
Dept: HOME HEALTH SERVICES | Facility: HOME HEALTHCARE | Age: 89
End: 2024-03-14
Payer: MEDICARE

## 2024-03-14 PROCEDURE — G0299 HHS/HOSPICE OF RN EA 15 MIN: HCPCS

## 2024-03-14 NOTE — CASE COMMUNICATION
60 Day Summary  Home Health need continues for: Wound care and assessment of wounds to BLE  Primary diagnoses/co-morbidities/recent procedures in past 60 days that impact current episode:   Current level of functional ability: pt using walker for ambulation w/ modA  Homebound status and living arrangements: pt lives in one story home w/ spouse. Son takes to appts and does daily wound care in SN absence  Goals accomplished and/or measura ble progress toward unmet goals in past 60 days:   Focus of care for next 60 days for each discipline ordered: Wound care to BLE  Skin integrity/wound status: Javier = 14  Estimated date when home care services will end 60 days  SDOH changes/barriers (i.e. Caregiver availability, social isolation, environment, income, transportation access, food insecurity etc.)  Need for MSW referral? N  Plan for next visit Wound care/assessment to BLE

## 2024-03-15 ENCOUNTER — TELEPHONE (OUTPATIENT)
Dept: CARDIOLOGY | Facility: CLINIC | Age: 89
End: 2024-03-15
Payer: MEDICARE

## 2024-03-15 VITALS
RESPIRATION RATE: 16 BRPM | HEART RATE: 76 BPM | TEMPERATURE: 97.9 F | DIASTOLIC BLOOD PRESSURE: 68 MMHG | OXYGEN SATURATION: 96 % | SYSTOLIC BLOOD PRESSURE: 124 MMHG

## 2024-03-15 NOTE — TELEPHONE ENCOUNTER
----- Message from Carly Mccullough RN sent at 3/1/2024  3:48 PM EST -----  Didn't know if you wanted to set reminders for this.   ----- Message -----  From: Oscar Shannon MD  Sent: 3/1/2024   3:04 PM EST  To: [facility] Reading Clinical Pool    Discussed with patient's son that the blood work was markedly abnormal.  We stopped spironolactone and meloxicam.  Can you plan for repeat blood work in 2 weeks?  Thank you!

## 2024-03-17 NOTE — HOME HEALTH
Wound care completed. Educated on importance of elevation to help prevent edema. Next visit: wound care/assessment Bed/Stretcher in lowest position, wheels locked, appropriate side rails in place/Call bell, personal items and telephone in reach/Instruct patient to call for assistance before getting out of bed/chair/stretcher/Non-slip footwear applied when patient is off stretcher/[place] to call system/Physically safe environment - no spills, clutter or unnecessary equipment/Purposeful proactive rounding/Room/bathroom lighting operational, light cord in reach

## 2024-03-17 NOTE — HOME HEALTH
Routine Visit Note:    Skill/education provided:Wound care/pulmonary assessment    Patient/caregiver response: pt tolerated well    Plan for next visit: pt tolerated well    Other pertinent info:

## 2024-03-18 ENCOUNTER — HOME CARE VISIT (OUTPATIENT)
Dept: HOME HEALTH SERVICES | Facility: HOME HEALTHCARE | Age: 89
End: 2024-03-18
Payer: MEDICARE

## 2024-03-18 ENCOUNTER — TELEPHONE (OUTPATIENT)
Dept: GASTROENTEROLOGY | Facility: CLINIC | Age: 89
End: 2024-03-18
Payer: MEDICARE

## 2024-03-18 VITALS
OXYGEN SATURATION: 100 % | RESPIRATION RATE: 16 BRPM | SYSTOLIC BLOOD PRESSURE: 138 MMHG | HEART RATE: 68 BPM | DIASTOLIC BLOOD PRESSURE: 69 MMHG | TEMPERATURE: 98.3 F

## 2024-03-18 DIAGNOSIS — K74.60 LIVER CIRRHOSIS SECONDARY TO NASH: Primary | ICD-10-CM

## 2024-03-18 DIAGNOSIS — R18.8 OTHER ASCITES: ICD-10-CM

## 2024-03-18 DIAGNOSIS — K75.81 LIVER CIRRHOSIS SECONDARY TO NASH: Primary | ICD-10-CM

## 2024-03-18 PROCEDURE — G0156 HHCP-SVS OF AIDE,EA 15 MIN: HCPCS

## 2024-03-18 PROCEDURE — G0299 HHS/HOSPICE OF RN EA 15 MIN: HCPCS

## 2024-03-18 NOTE — TELEPHONE ENCOUNTER
I SPOKE WITH MIGDALIA ELISE'S RECOMMENDATION GIVEN. I ALSO GAVE [facility] SCHEDULING CONTACT INFORMATION [phone].

## 2024-03-18 NOTE — TELEPHONE ENCOUNTER
Paracentesis and fluid labs ordered. Recommend continued close follow up with cardiology for diuretic management, as suspicion for underlying cardiac source of fluid overload is ongoing.

## 2024-03-18 NOTE — TELEPHONE ENCOUNTER
Sheba,    Patient's son called this morning. Patient is retaining a lot a fluid in his abdomen area. Shortness of breath when he walks back and forth to and from bathroom. Decreasing his mobility. Son is requesting another Paracentesis. Thanks

## 2024-03-19 VITALS
TEMPERATURE: 97.5 F | OXYGEN SATURATION: 98 % | SYSTOLIC BLOOD PRESSURE: 124 MMHG | HEART RATE: 52 BPM | DIASTOLIC BLOOD PRESSURE: 68 MMHG | RESPIRATION RATE: 18 BRPM

## 2024-03-19 NOTE — HOME HEALTH
Wound care and assessment/documentation completed. Pt gaining fluid around abdomen. Has paracentesis on Thursday. Left lung diminished. VSS. Next visit: Wound care

## 2024-03-20 ENCOUNTER — PREP FOR SURGERY (OUTPATIENT)
Dept: OTHER | Facility: HOSPITAL | Age: 89
End: 2024-03-20
Payer: MEDICARE

## 2024-03-20 ENCOUNTER — TELEPHONE (OUTPATIENT)
Dept: INFUSION THERAPY | Facility: HOSPITAL | Age: 89
End: 2024-03-20
Payer: MEDICARE

## 2024-03-20 RX ORDER — SODIUM CHLORIDE 0.9 % (FLUSH) 0.9 %
10 SYRINGE (ML) INJECTION AS NEEDED
Status: CANCELLED | OUTPATIENT
Start: 2024-03-20

## 2024-03-20 RX ORDER — SODIUM CHLORIDE 9 MG/ML
40 INJECTION, SOLUTION INTRAVENOUS AS NEEDED
Status: CANCELLED | OUTPATIENT
Start: 2024-03-20

## 2024-03-20 RX ORDER — SODIUM CHLORIDE 0.9 % (FLUSH) 0.9 %
3 SYRINGE (ML) INJECTION EVERY 12 HOURS SCHEDULED
Status: CANCELLED | OUTPATIENT
Start: 2024-03-20

## 2024-03-21 ENCOUNTER — HOSPITAL ENCOUNTER (OUTPATIENT)
Dept: ULTRASOUND IMAGING | Facility: HOSPITAL | Age: 89
Discharge: HOME | End: 2024-03-21
Payer: MEDICARE

## 2024-03-21 VITALS
BODY MASS INDEX: 26.41 KG/M2 | RESPIRATION RATE: 18 BRPM | HEART RATE: 50 BPM | WEIGHT: 195 LBS | HEIGHT: 72 IN | OXYGEN SATURATION: 99 % | DIASTOLIC BLOOD PRESSURE: 58 MMHG | TEMPERATURE: 97.8 F | SYSTOLIC BLOOD PRESSURE: 132 MMHG

## 2024-03-21 DIAGNOSIS — K75.81 LIVER CIRRHOSIS SECONDARY TO NASH: ICD-10-CM

## 2024-03-21 DIAGNOSIS — K74.60 LIVER CIRRHOSIS SECONDARY TO NASH: ICD-10-CM

## 2024-03-21 DIAGNOSIS — R18.8 OTHER ASCITES: ICD-10-CM

## 2024-03-21 LAB
ALBUMIN FLD-MCNC: 2.2 G/DL
AMYLASE FLD-CCNC: 26 U/L
APPEARANCE FLD: ABNORMAL
COLOR FLD: ABNORMAL
GLUCOSE FLD-MCNC: 134 MG/DL
LDH FLD-CCNC: 70 U/L
LYMPHOCYTES NFR FLD MANUAL: 81 %
MACROPHAGE FLUID: 15 %
MESOTHL CELL NFR FLD MANUAL: 1 %
NEUTROPHILS NFR FLD MANUAL: 3 %
PROT FLD-MCNC: 3.2 G/DL
RBC # FLD AUTO: 2000 /MM3
WBC # FLD AUTO: 372 /MM3

## 2024-03-21 PROCEDURE — 87205 SMEAR GRAM STAIN: CPT | Performed by: PHYSICIAN ASSISTANT

## 2024-03-21 PROCEDURE — 82945 GLUCOSE OTHER FLUID: CPT | Performed by: PHYSICIAN ASSISTANT

## 2024-03-21 PROCEDURE — 82042 OTHER SOURCE ALBUMIN QUAN EA: CPT | Performed by: PHYSICIAN ASSISTANT

## 2024-03-21 PROCEDURE — 87015 SPECIMEN INFECT AGNT CONCNTJ: CPT | Performed by: PHYSICIAN ASSISTANT

## 2024-03-21 PROCEDURE — 87070 CULTURE OTHR SPECIMN AEROBIC: CPT | Performed by: PHYSICIAN ASSISTANT

## 2024-03-21 PROCEDURE — 76942 ECHO GUIDE FOR BIOPSY: CPT

## 2024-03-21 PROCEDURE — 83615 LACTATE (LD) (LDH) ENZYME: CPT | Performed by: PHYSICIAN ASSISTANT

## 2024-03-21 PROCEDURE — 89051 BODY FLUID CELL COUNT: CPT | Performed by: PHYSICIAN ASSISTANT

## 2024-03-21 PROCEDURE — 82150 ASSAY OF AMYLASE: CPT | Performed by: PHYSICIAN ASSISTANT

## 2024-03-21 PROCEDURE — 84157 ASSAY OF PROTEIN OTHER: CPT | Performed by: PHYSICIAN ASSISTANT

## 2024-03-21 RX ORDER — SODIUM CHLORIDE 0.9 % (FLUSH) 0.9 %
10 SYRINGE (ML) INJECTION AS NEEDED
Status: DISCONTINUED | OUTPATIENT
Start: 2024-03-21 | End: 2024-03-22 | Stop reason: HOSPADM

## 2024-03-21 RX ORDER — SODIUM CHLORIDE 0.9 % (FLUSH) 0.9 %
3 SYRINGE (ML) INJECTION EVERY 12 HOURS SCHEDULED
Status: DISCONTINUED | OUTPATIENT
Start: 2024-03-21 | End: 2024-03-22 | Stop reason: HOSPADM

## 2024-03-21 RX ORDER — LIDOCAINE HYDROCHLORIDE 10 MG/ML
5 INJECTION, SOLUTION EPIDURAL; INFILTRATION; INTRACAUDAL; PERINEURAL ONCE
Status: DISCONTINUED | OUTPATIENT
Start: 2024-03-21 | End: 2024-03-22 | Stop reason: HOSPADM

## 2024-03-21 RX ORDER — SODIUM CHLORIDE 9 MG/ML
40 INJECTION, SOLUTION INTRAVENOUS AS NEEDED
Status: DISCONTINUED | OUTPATIENT
Start: 2024-03-21 | End: 2024-03-22 | Stop reason: HOSPADM

## 2024-03-21 NOTE — POST-PROCEDURE NOTE
Vascular Interventional Radiology  Procedure Note    Date: 03/21/24      Time: 13:01 EDT     Pre-op Diagnosis: Ascites     Post-op Diagnosis: Ascites    Procedure: US guided Paracentesis. Via LQ. See report for details.    Volume removed: See report.    : EMMETT Lou    Attending: Franky Monk MD     Assistants: HUSSAIN    Sedation: None    Estimated Blood Loss (EBL): 0 cc    IVF: NA    Findings: Above    Specimens: See report.    Complications: See report.    Disposition: IR recovery.    EMMETT Lou  Vascular Interventional Radiology

## 2024-03-21 NOTE — DISCHARGE INSTRUCTIONS
Avoid any strenuous activities, pulling, tugging, straining or lifting anything over 10 pounds for the next 24 HOURS.    You may remove the bandage tomorrow and shower tomorrow; but you will need to avoid tub baths or any situation where your are submersed in water for the next 4 or 5 days to avoid the risk of infection.     DO NOT DRIVE ON THE DAY OF YOUR PROCEDURE.    If you have any problems or concern please contact your physician's office.      Get help right away if:  You have chest pain or shortness of breath.  You feel dizzy or light-headed or you faint.

## 2024-03-21 NOTE — TELEPHONE ENCOUNTER
Pt's son Jose Juan contacted as pre-procedure phone call prior to planned para for 3-21. Reviewed with patient arrival time, location, may eat early breakfast, okay to take blood pressure medications morning of procedure with a small sip of water,  needed, reviewed procedure instructions and allowed time for questions, will review meds and history tomorrow as son doesn't have list right now.

## 2024-03-21 NOTE — NURSING NOTE
US guided paracentesis performed by Juliette CHRISTINE.  3.3 Liters of fluid removed from peritoneum. Patient tolerated well. Report called to Elsa CHOWDARY.

## 2024-03-22 ENCOUNTER — HOME CARE VISIT (OUTPATIENT)
Dept: HOME HEALTH SERVICES | Facility: HOME HEALTHCARE | Age: 89
End: 2024-03-22
Payer: MEDICARE

## 2024-03-22 PROCEDURE — G0299 HHS/HOSPICE OF RN EA 15 MIN: HCPCS

## 2024-03-24 VITALS
DIASTOLIC BLOOD PRESSURE: 68 MMHG | OXYGEN SATURATION: 100 % | TEMPERATURE: 97.3 F | HEART RATE: 53 BPM | SYSTOLIC BLOOD PRESSURE: 118 MMHG | RESPIRATION RATE: 18 BRPM

## 2024-03-24 LAB
BACTERIA FLD CULT: NORMAL
GRAM STN SPEC: NORMAL
GRAM STN SPEC: NORMAL

## 2024-03-25 ENCOUNTER — HOME CARE VISIT (OUTPATIENT)
Dept: HOME HEALTH SERVICES | Facility: HOME HEALTHCARE | Age: 89
End: 2024-03-25
Payer: MEDICARE

## 2024-03-25 ENCOUNTER — TELEPHONE (OUTPATIENT)
Dept: CARDIOLOGY | Facility: CLINIC | Age: 89
End: 2024-03-25
Payer: MEDICARE

## 2024-03-25 VITALS
OXYGEN SATURATION: 98 % | RESPIRATION RATE: 16 BRPM | TEMPERATURE: 97.6 F | SYSTOLIC BLOOD PRESSURE: 111 MMHG | DIASTOLIC BLOOD PRESSURE: 78 MMHG | HEART RATE: 62 BPM

## 2024-03-25 VITALS
SYSTOLIC BLOOD PRESSURE: 120 MMHG | DIASTOLIC BLOOD PRESSURE: 64 MMHG | RESPIRATION RATE: 16 BRPM | TEMPERATURE: 97.4 F | HEART RATE: 66 BPM | OXYGEN SATURATION: 95 %

## 2024-03-25 PROCEDURE — G0156 HHCP-SVS OF AIDE,EA 15 MIN: HCPCS

## 2024-03-25 PROCEDURE — G0299 HHS/HOSPICE OF RN EA 15 MIN: HCPCS

## 2024-03-25 NOTE — TELEPHONE ENCOUNTER
Patient's son called regarding swelling since changes in medicine, and would like to know about restarting the Spironolactone. Call [phone]

## 2024-03-25 NOTE — TELEPHONE ENCOUNTER
Spoke with Jose Juan, son, and he advised that they have been noticing more swelling in Mr. Clark's cheek, eyes, and right hand.  He has a hx of cirrhosis and recently had paracentesis at [facility] and had  3.5 liters of fluid removed.  Son denies that Mr. Clark is sob.  Son is wanting to know if they can restart the spironolactone that Mirtha stopped on or around 3/15. I advised him that Dr Shannon is out of the office x2 weeks and I would have to send this message to Dr. Park and call him back.  Last labs were on  3/11/24 and creatinine level was 1.8/

## 2024-03-26 ENCOUNTER — TELEPHONE (OUTPATIENT)
Dept: FAMILY MEDICINE CLINIC | Facility: CLINIC | Age: 89
End: 2024-03-26
Payer: MEDICARE

## 2024-03-26 NOTE — TELEPHONE ENCOUNTER
I called patients son to schedule a wellness visit for patient, but son is requesting to do the wellness  via telehealth due to his dads health. I was unsure if this could be done. Please advise.

## 2024-03-26 NOTE — TELEPHONE ENCOUNTER
Spoke with Mr. Clark's son, Jose Juan, and explained that Dr. Park reviewed the message and his chart and due to the fact that Dr. Shannon stopped the Aldactone because his K+ level was elevated he does not want to restart that.  High K+ levels can cause death and we have to be careful.  We discussed that the swelling he described yesterday to the cheek, eyes, and only 1 arm seem more related to the cirrhosis vs heart and Dr. Milton would like you to reach out to gastro doctor seeing him for the liver issue. He verbalized understanding.

## 2024-03-27 ENCOUNTER — TELEPHONE (OUTPATIENT)
Dept: CARDIOLOGY | Facility: CLINIC | Age: 89
End: 2024-03-27
Payer: MEDICARE

## 2024-03-27 NOTE — TELEPHONE ENCOUNTER
informed patient that his test was normal and Eliquis appears to be adequately dosed with 2.5 mg twice daily. PT verbalized understanding and had no further questions.

## 2024-03-27 NOTE — TELEPHONE ENCOUNTER
----- Message from Oscar Shannon MD sent at 3/26/2024  5:10 PM EDT -----  Please inform patient that his test was normal and Eliquis appears to be adequately dosed with 2.5 mg twice daily. Thank you!

## 2024-03-28 NOTE — HOME HEALTH
Routine Visit Note:    Skill/education provided: Wound care/cp assessment    Patient/caregiver response: pt tolerated well    Plan for next visit: wound care/cp assessment    Other pertinent info:

## 2024-03-28 NOTE — TELEPHONE ENCOUNTER
I do not think right now the wellness things all that important for him with his health.  It is okay for him not to worry about that right now

## 2024-03-29 ENCOUNTER — TELEPHONE (OUTPATIENT)
Dept: GASTROENTEROLOGY | Facility: CLINIC | Age: 89
End: 2024-03-29
Payer: MEDICARE

## 2024-03-29 NOTE — TELEPHONE ENCOUNTER
I'd like to see patient back in office and discuss goals of care. His first para was 1/22, second was 3/21. We need to determine an appropriate interval for such moving forward.

## 2024-03-29 NOTE — TELEPHONE ENCOUNTER
Jose Juan (patients son) wants to you if you can put in a standing order for paracentesis. He said radiology told him that would be better than keep having to go through our office..Please advise.

## 2024-04-01 ENCOUNTER — HOME CARE VISIT (OUTPATIENT)
Dept: HOME HEALTH SERVICES | Facility: HOME HEALTHCARE | Age: 89
End: 2024-04-01
Payer: MEDICARE

## 2024-04-01 VITALS
OXYGEN SATURATION: 99 % | DIASTOLIC BLOOD PRESSURE: 60 MMHG | SYSTOLIC BLOOD PRESSURE: 120 MMHG | HEART RATE: 64 BPM | TEMPERATURE: 97.4 F | RESPIRATION RATE: 16 BRPM

## 2024-04-01 DIAGNOSIS — G45.9 TIA (TRANSIENT ISCHEMIC ATTACK): ICD-10-CM

## 2024-04-01 DIAGNOSIS — I10 ESSENTIAL (PRIMARY) HYPERTENSION: ICD-10-CM

## 2024-04-01 PROCEDURE — G0299 HHS/HOSPICE OF RN EA 15 MIN: HCPCS

## 2024-04-02 RX ORDER — ATORVASTATIN CALCIUM 80 MG/1
80 TABLET, FILM COATED ORAL
Qty: 90 TABLET | Refills: 1 | Status: SHIPPED | OUTPATIENT
Start: 2024-04-02

## 2024-04-02 NOTE — HOME HEALTH
Routine Visit Note:    Skill/education provided: WOund care and assessment    Patient/caregiver response: pt tolerated well    Plan for next visit: wound care    Other pertinent info: family doing daily dressing changes. SN twice weekly to monitor wounds.

## 2024-04-03 ENCOUNTER — HOME CARE VISIT (OUTPATIENT)
Dept: HOME HEALTH SERVICES | Facility: HOME HEALTHCARE | Age: 89
End: 2024-04-03
Payer: MEDICARE

## 2024-04-03 VITALS
TEMPERATURE: 98.1 F | RESPIRATION RATE: 18 BRPM | HEART RATE: 51 BPM | DIASTOLIC BLOOD PRESSURE: 60 MMHG | SYSTOLIC BLOOD PRESSURE: 120 MMHG | OXYGEN SATURATION: 100 %

## 2024-04-03 PROCEDURE — G0299 HHS/HOSPICE OF RN EA 15 MIN: HCPCS

## 2024-04-04 ENCOUNTER — PATIENT OUTREACH (OUTPATIENT)
Dept: ONCOLOGY | Facility: CLINIC | Age: 89
End: 2024-04-04
Payer: MEDICARE

## 2024-04-04 NOTE — SIGNIFICANT NOTE
Called patient number and spoke with son regarding cancelled CT chest. Son states patient is not in good enough health to get out to go to scan. They do not wish to reschedule. They know to call if condition improves.

## 2024-04-05 ENCOUNTER — HOME CARE VISIT (OUTPATIENT)
Dept: HOME HEALTH SERVICES | Facility: HOME HEALTHCARE | Age: 89
End: 2024-04-05
Payer: MEDICARE

## 2024-04-05 VITALS
TEMPERATURE: 98.1 F | SYSTOLIC BLOOD PRESSURE: 108 MMHG | HEART RATE: 62 BPM | OXYGEN SATURATION: 100 % | DIASTOLIC BLOOD PRESSURE: 85 MMHG | RESPIRATION RATE: 16 BRPM

## 2024-04-05 VITALS
OXYGEN SATURATION: 99 % | RESPIRATION RATE: 18 BRPM | SYSTOLIC BLOOD PRESSURE: 128 MMHG | DIASTOLIC BLOOD PRESSURE: 68 MMHG | TEMPERATURE: 98.1 F | HEART RATE: 60 BPM

## 2024-04-05 PROCEDURE — G0299 HHS/HOSPICE OF RN EA 15 MIN: HCPCS

## 2024-04-05 PROCEDURE — G0156 HHCP-SVS OF AIDE,EA 15 MIN: HCPCS

## 2024-04-08 ENCOUNTER — TELEPHONE (OUTPATIENT)
Dept: GASTROENTEROLOGY | Facility: CLINIC | Age: 89
End: 2024-04-08
Payer: MEDICARE

## 2024-04-08 ENCOUNTER — HOME CARE VISIT (OUTPATIENT)
Dept: HOME HEALTH SERVICES | Facility: HOME HEALTHCARE | Age: 89
End: 2024-04-08
Payer: MEDICARE

## 2024-04-08 PROCEDURE — G0299 HHS/HOSPICE OF RN EA 15 MIN: HCPCS

## 2024-04-08 NOTE — HOME HEALTH
Routine Visit Note:    Skill/education provided: Wound care/assessment    Patient/caregiver response: pt tolerated well    Plan for next visit: wound care/assessment    Other pertinent info:

## 2024-04-08 NOTE — TELEPHONE ENCOUNTER
Pt is scheduled with me 6/20 in [place]. Please offer office appt in [place] if sooner available so we can discuss goals of care. I placed repeat paracentesis orders last week for him, but recommend prompt evaluation at ED for concern of portal vein thrombosis, SBP, etc that may be contributing to his worsening / recurrent ascites.

## 2024-04-08 NOTE — TELEPHONE ENCOUNTER
I spoke with Mr Manzano. Sheba's recommendation given. Jose Juan voiced understanding and agreed.

## 2024-04-08 NOTE — TELEPHONE ENCOUNTER
Jose Juan Scruggs(son) called requesting a paracentesis. Fluid has came back quicker this time on his abdomen. Jose Juan is requesting a standing paracentesis order so he can get paracentesis as needed. Please advise. Thanks

## 2024-04-09 ENCOUNTER — HOME CARE VISIT (OUTPATIENT)
Dept: HOME HEALTH SERVICES | Facility: HOME HEALTHCARE | Age: 89
End: 2024-04-09
Payer: MEDICARE

## 2024-04-09 VITALS
RESPIRATION RATE: 18 BRPM | HEART RATE: 68 BPM | DIASTOLIC BLOOD PRESSURE: 60 MMHG | OXYGEN SATURATION: 98 % | SYSTOLIC BLOOD PRESSURE: 128 MMHG | TEMPERATURE: 98.1 F

## 2024-04-09 NOTE — HOME HEALTH
Routine Visit Note:    Skill/education provided: Wound care to BLE/CP assessment    Patient/caregiver response: pt tolerated well    Plan for next visit: Wound care    Other pertinent info: pt to be set up w/ GI/ for possible paracentesis next week

## 2024-04-09 NOTE — CASE COMMUNICATION
Patient missed a AIDE visit from [facility] on 4-9-24.     Reason: N/A.       For your records only.   Per CMS Guidance, MD must be notified of missed/cancelled visits; therefore the prescribed frequency was not met.

## 2024-04-10 ENCOUNTER — HOME CARE VISIT (OUTPATIENT)
Dept: HOME HEALTH SERVICES | Facility: HOME HEALTHCARE | Age: 89
End: 2024-04-10
Payer: MEDICARE

## 2024-04-10 VITALS
TEMPERATURE: 97.5 F | OXYGEN SATURATION: 98 % | RESPIRATION RATE: 16 BRPM | DIASTOLIC BLOOD PRESSURE: 68 MMHG | HEART RATE: 51 BPM | SYSTOLIC BLOOD PRESSURE: 124 MMHG

## 2024-04-10 DIAGNOSIS — K75.81 LIVER CIRRHOSIS SECONDARY TO NASH: Primary | ICD-10-CM

## 2024-04-10 DIAGNOSIS — K74.60 LIVER CIRRHOSIS SECONDARY TO NASH: Primary | ICD-10-CM

## 2024-04-10 PROCEDURE — G0299 HHS/HOSPICE OF RN EA 15 MIN: HCPCS

## 2024-04-10 RX ORDER — ALBUMIN (HUMAN) 12.5 G/50ML
12.5 SOLUTION INTRAVENOUS DAILY
OUTPATIENT
Start: 2024-04-10

## 2024-04-11 NOTE — HOME HEALTH
Routine Visit Note:    Skill/education provided: Wound care and assessment    Patient/caregiver response: pt tolerated well    Plan for next visit: wound care and assessment    Other pertinent info: Called GI office and got ordres for paracentesis and GI appt. Left great toe red, swollen, w/ pus. Wound care clinic called. Pt has appt Friday and they will check then. SSI education to family

## 2024-04-12 ENCOUNTER — HOME CARE VISIT (OUTPATIENT)
Dept: HOME HEALTH SERVICES | Facility: HOME HEALTHCARE | Age: 89
End: 2024-04-12
Payer: MEDICARE

## 2024-04-15 ENCOUNTER — HOME CARE VISIT (OUTPATIENT)
Dept: HOME HEALTH SERVICES | Facility: HOME HEALTHCARE | Age: 89
End: 2024-04-15
Payer: MEDICARE

## 2024-04-15 VITALS
RESPIRATION RATE: 16 BRPM | HEART RATE: 63 BPM | OXYGEN SATURATION: 99 % | SYSTOLIC BLOOD PRESSURE: 119 MMHG | DIASTOLIC BLOOD PRESSURE: 67 MMHG | TEMPERATURE: 97.9 F

## 2024-04-15 VITALS
DIASTOLIC BLOOD PRESSURE: 67 MMHG | OXYGEN SATURATION: 99 % | HEART RATE: 63 BPM | RESPIRATION RATE: 20 BRPM | TEMPERATURE: 97.9 F | SYSTOLIC BLOOD PRESSURE: 119 MMHG

## 2024-04-15 PROCEDURE — G0156 HHCP-SVS OF AIDE,EA 15 MIN: HCPCS

## 2024-04-15 PROCEDURE — G0299 HHS/HOSPICE OF RN EA 15 MIN: HCPCS

## 2024-04-16 ENCOUNTER — PREP FOR SURGERY (OUTPATIENT)
Dept: OTHER | Facility: HOSPITAL | Age: 89
End: 2024-04-16
Payer: MEDICARE

## 2024-04-16 RX ORDER — SODIUM CHLORIDE 0.9 % (FLUSH) 0.9 %
10 SYRINGE (ML) INJECTION AS NEEDED
Status: CANCELLED | OUTPATIENT
Start: 2024-04-16

## 2024-04-16 RX ORDER — SODIUM CHLORIDE 0.9 % (FLUSH) 0.9 %
3 SYRINGE (ML) INJECTION EVERY 12 HOURS SCHEDULED
Status: CANCELLED | OUTPATIENT
Start: 2024-04-16

## 2024-04-16 RX ORDER — SODIUM CHLORIDE 9 MG/ML
40 INJECTION, SOLUTION INTRAVENOUS AS NEEDED
Status: CANCELLED | OUTPATIENT
Start: 2024-04-16

## 2024-04-16 NOTE — HOME HEALTH
Routine Visit Note:    Skill/education provided: Wound care/CP assessment    Patient/caregiver response: pt tolerated well    Plan for next visit: Wound care and documentation/CP assessment    Other pertinent info: pt to have paracenesis Thursday

## 2024-04-17 ENCOUNTER — TELEPHONE (OUTPATIENT)
Dept: INFUSION THERAPY | Facility: HOSPITAL | Age: 89
End: 2024-04-17
Payer: MEDICARE

## 2024-04-17 ENCOUNTER — HOME CARE VISIT (OUTPATIENT)
Dept: HOME HEALTH SERVICES | Facility: HOME HEALTHCARE | Age: 89
End: 2024-04-17
Payer: MEDICARE

## 2024-04-17 VITALS
RESPIRATION RATE: 16 BRPM | SYSTOLIC BLOOD PRESSURE: 120 MMHG | DIASTOLIC BLOOD PRESSURE: 60 MMHG | TEMPERATURE: 98.1 F | HEART RATE: 65 BPM | OXYGEN SATURATION: 99 %

## 2024-04-17 PROCEDURE — G0299 HHS/HOSPICE OF RN EA 15 MIN: HCPCS

## 2024-04-17 NOTE — TELEPHONE ENCOUNTER
Attempted to contact Mr. Clark as appointment reminder as he is scheduled for paracentesis tomorrow morning at 9:00. Spoke with his son who confirms that they will be keeping that appointment.

## 2024-04-18 ENCOUNTER — HOSPITAL ENCOUNTER (OUTPATIENT)
Dept: INTERVENTIONAL RADIOLOGY/VASCULAR | Facility: HOSPITAL | Age: 89
Discharge: HOME | End: 2024-04-18
Payer: MEDICARE

## 2024-04-18 VITALS
HEART RATE: 65 BPM | WEIGHT: 199 LBS | TEMPERATURE: 96.8 F | OXYGEN SATURATION: 99 % | HEIGHT: 73 IN | DIASTOLIC BLOOD PRESSURE: 63 MMHG | RESPIRATION RATE: 15 BRPM | BODY MASS INDEX: 26.37 KG/M2 | SYSTOLIC BLOOD PRESSURE: 140 MMHG

## 2024-04-18 DIAGNOSIS — K74.60 LIVER CIRRHOSIS SECONDARY TO NASH: ICD-10-CM

## 2024-04-18 DIAGNOSIS — K75.81 LIVER CIRRHOSIS SECONDARY TO NASH: ICD-10-CM

## 2024-04-18 LAB
APPEARANCE FLD: ABNORMAL
COLOR FLD: YELLOW
GLUCOSE BLDC GLUCOMTR-MCNC: 129 MG/DL (ref 70–130)
INR PPP: 1.38 (ref 0.89–1.12)
LYMPHOCYTES NFR FLD MANUAL: 43 %
MACROPHAGE FLUID: 38 %
MONOCYTES NFR FLD: 7 %
NEUTROPHILS NFR FLD MANUAL: 12 %
PROTHROMBIN TIME: 17.1 SECONDS (ref 12.2–14.5)
RBC # FLD AUTO: <2000 /MM3
WBC # FLD AUTO: 264 /MM3

## 2024-04-18 PROCEDURE — 25010000002 ALBUMIN HUMAN 25% PER 50 ML: Performed by: PHYSICIAN ASSISTANT

## 2024-04-18 PROCEDURE — 85610 PROTHROMBIN TIME: CPT | Performed by: NURSE PRACTITIONER

## 2024-04-18 PROCEDURE — 76942 ECHO GUIDE FOR BIOPSY: CPT

## 2024-04-18 PROCEDURE — P9047 ALBUMIN (HUMAN), 25%, 50ML: HCPCS | Performed by: PHYSICIAN ASSISTANT

## 2024-04-18 PROCEDURE — 96365 THER/PROPH/DIAG IV INF INIT: CPT

## 2024-04-18 PROCEDURE — 82948 REAGENT STRIP/BLOOD GLUCOSE: CPT

## 2024-04-18 PROCEDURE — C1729 CATH, DRAINAGE: HCPCS

## 2024-04-18 PROCEDURE — 25010000002 LIDOCAINE 1 % SOLUTION: Performed by: PHYSICIAN ASSISTANT

## 2024-04-18 PROCEDURE — 89051 BODY FLUID CELL COUNT: CPT | Performed by: PHYSICIAN ASSISTANT

## 2024-04-18 RX ORDER — SODIUM CHLORIDE 0.9 % (FLUSH) 0.9 %
3 SYRINGE (ML) INJECTION EVERY 12 HOURS SCHEDULED
Status: DISCONTINUED | OUTPATIENT
Start: 2024-04-18 | End: 2024-04-19 | Stop reason: HOSPADM

## 2024-04-18 RX ORDER — LIDOCAINE HYDROCHLORIDE 10 MG/ML
10 INJECTION, SOLUTION INFILTRATION; PERINEURAL ONCE
Status: COMPLETED | OUTPATIENT
Start: 2024-04-18 | End: 2024-04-18

## 2024-04-18 RX ORDER — DOXYCYCLINE HYCLATE 100 MG/1
100 CAPSULE ORAL 2 TIMES DAILY
COMMUNITY
Start: 2024-04-12 | End: 2024-04-26

## 2024-04-18 RX ORDER — ALBUMIN (HUMAN) 12.5 G/50ML
12.5 SOLUTION INTRAVENOUS DAILY
Status: DISCONTINUED | OUTPATIENT
Start: 2024-04-18 | End: 2024-04-19 | Stop reason: HOSPADM

## 2024-04-18 RX ORDER — SODIUM CHLORIDE 9 MG/ML
40 INJECTION, SOLUTION INTRAVENOUS AS NEEDED
Status: DISCONTINUED | OUTPATIENT
Start: 2024-04-18 | End: 2024-04-19 | Stop reason: HOSPADM

## 2024-04-18 RX ORDER — SODIUM CHLORIDE 0.9 % (FLUSH) 0.9 %
10 SYRINGE (ML) INJECTION AS NEEDED
Status: DISCONTINUED | OUTPATIENT
Start: 2024-04-18 | End: 2024-04-19 | Stop reason: HOSPADM

## 2024-04-18 RX ADMIN — ALBUMIN (HUMAN) 12.5 G: 0.25 INJECTION, SOLUTION INTRAVENOUS at 10:40

## 2024-04-18 RX ADMIN — LIDOCAINE HYDROCHLORIDE 8 ML: 10 INJECTION, SOLUTION INFILTRATION; PERINEURAL at 10:07

## 2024-04-18 NOTE — PRE-PROCEDURE NOTE
[facility]   Vascular Interventional Radiology  History & Physicial        Patient Name:Onel Clark    : 3/25/1933    MRN: [id number]    Primary Care Physician: Ilir Fair MD    Referring Physician: Sheba Pereira PA-C     Date of admission: 2024    Subjective     Reason for Consult: Paracentesis for ascites.    History of Present Illness     Onel Clark is a 89 y.o. male referred to IR as noted above.      Active Hospital Problems:  There are no active hospital problems to display for this patient.      Personal History     Past Medical History:   Diagnosis Date    Acquired hypothyroidism     Allergic     Allergies     Arthritis     Benign prostatic hyperplasia with nocturia     Bilateral carotid artery disease     Cancer of the skin, basal cell     Cataract     CHF (congestive heart failure)     Chronic non-seasonal allergic rhinitis     DUE TO POLLEN    Chronic renal impairment     COPD (chronic obstructive pulmonary disease)     Coronary artery disease     Decreased hearing of both ears     Degenerative lumbar spinal stenosis     Diabetes mellitus     Elevated PSA     Frequent falls     GERD without esophagitis     Heart murmur     High risk medication use     HL (hearing loss)     Hx of bilateral hip replacements     Hx of decompressive lumbar laminectomy     Hx of total knee replacement, bilateral     Hx of transient ischemic attack (TIA)     Hypertension     Mixed hyperlipidemia due to type 2 diabetes mellitus     Nicotine dependence     No natural teeth     FALSE TEETH    Osteoarthritis     Primary hypertension     Primary osteoarthritis involving multiple joints     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 2 diabetes mellitus     Stroke TIA  2 years ago    Thyroid disease     TIA (transient ischemic attack)     Type 2 diabetes mellitus with diabetic polyneuropathy     Type 2 diabetes mellitus with stage 2 chronic kidney disease, without long-term current use of insulin        Past  "Surgical History:   Procedure Laterality Date    BACK SURGERY  2007    Lumbar Laminectomy    CARPAL TUNNEL RELEASE  2012    COLONOSCOPY      EYE SURGERY  cataracts    JOINT REPLACEMENT  both hips & knee & back    REPLACEMENT TOTAL KNEE Left 2013    SPINE SURGERY  about 15 yrs ago    TOTAL HIP ARTHROPLASTY Right 2016       Family History: His family history includes Alzheimer's disease in his mother; Arthritis in his brother and sister; Coronary artery disease in his father; Diabetes in his brother, mother, and sister; Hearing loss in his brother and father; Heart attack in his father; Hyperlipidemia in his father, mother, and sister; Hypertension in his sister; Stroke in his father.     Social History: He  reports that he quit smoking about 24 years ago. His smoking use included cigarettes. He started smoking about 34 years ago. He has a 10 pack-year smoking history. He has been exposed to tobacco smoke. He has never used smokeless tobacco. He reports current alcohol use of about 1.0 standard drink of alcohol per week. He reports that he does not use drugs.    Home Medications:  Aspirin, Calcium Carb-Cholecalciferol, Cetirizine HCl, Magnesium, amLODIPine, apixaban, atorvastatin, bumetanide, doxycycline, levothyroxine, losartan, memantine, metFORMIN, multivitamin with minerals, omeprazole, and vitamin C    Current Medications:    albumin human    sodium chloride    sodium chloride    sodium chloride     Allergies:  No Known Allergies    Review of Systems    IR Procedure pertinent significant findings are mentioned in the PMH and PSH above.    Objective     Visit Vitals  /75   Pulse 56   Temp 96.8 °F (36 °C) (Tympanic)   Resp 15   Ht 185.4 cm (73\")   Wt 90.3 kg (199 lb)   SpO2 97%   BMI 26.25 kg/m²        Physical Exam    A&Ox3.   Able to communicate  No Apparent Distress  Average physique   CVS: VS as noted. Chart reviewed. Stable for the procedure.  Respiratory: Non labored breathing. No signs of respiratory " "compromise.    Result Review      I have personally reviewed the results from the time of this admission to 4/18/2024 09:56 EDT and agree with these findings.  [x]  Laboratory  []  Microbiology  [x]  Radiology  []  EKG/Telemetry   []  Cardiology/Vascular   []  Pathology  []  Old records  []  Other:    Most notable findings include: As noted:      CBC & INR reviewed.                CrCl cannot be calculated (Patient's most recent lab result is older than the maximum 30 days allowed.). No results found for: \"CREATININE\"    No results found for: \"COVID19\"     No results found for: \"PREGTESTUR\", \"PREGSERUM\", \"HCG\", \"HCGQUANT\"     ASA SCALE ASSESSMENT (applicable ONLY if sedation planned):        MALLAMPATI CLASSIFICATION (applicable ONLY if sedation planned):       Assessment / Plan     Onel Clark is a 89 y.o. male referred to the IR service with above problem.    Plan:   As above.    Notice: The note was created before the performance of the procedure. It might have been left in the pending status and signed off after the procedure. The time stamp on the note may be misleading.    EMMETT Ford   Vascular Interventional Radiology  04/18/24   9:56 AM EDT     "

## 2024-04-18 NOTE — POST-PROCEDURE NOTE
Vascular Interventional Radiology  Procedure Note    Date: 04/18/24      Time: 09:57 EDT     Pre-op Diagnosis: Ascites     Post-op Diagnosis: Ascites    Procedure: US guided Paracentesis. Via RLQ. See report for details.    Volume removed: See report.    Sedation: None    Estimated Blood Loss (EBL): 0 cc    IVF: NA    Findings: Above    Specimens: See report.    Complications: See report.    Disposition: IR recovery.    EMMETT Lou  Vascular Interventional Radiology

## 2024-04-18 NOTE — NURSING NOTE
Image guided paracentesis performed by EMMETT Lou. 5000 mls of  cloudy, yellow fluid removed from peritoneum. Patient tolerated well. Report called to RICARDA Horvath.

## 2024-04-19 ENCOUNTER — HOME CARE VISIT (OUTPATIENT)
Dept: HOME HEALTH SERVICES | Facility: HOME HEALTHCARE | Age: 89
End: 2024-04-19
Payer: MEDICARE

## 2024-04-19 PROCEDURE — G0299 HHS/HOSPICE OF RN EA 15 MIN: HCPCS

## 2024-04-19 NOTE — HOME HEALTH
Routine Visit Note:    Skill/education provided: Wound care/CP assessment/wound documentation    Patient/caregiver response: pt tolerated well    Plan for next visit: Wound care/cp assessment    Other pertinent info: pt goes for paracenteiss in AM

## 2024-04-21 VITALS
DIASTOLIC BLOOD PRESSURE: 68 MMHG | TEMPERATURE: 97.6 F | SYSTOLIC BLOOD PRESSURE: 128 MMHG | HEART RATE: 61 BPM | RESPIRATION RATE: 18 BRPM | OXYGEN SATURATION: 99 %

## 2024-04-21 NOTE — HOME HEALTH
Routine Visit Note:    Skill/education provided: Wound care to BLE    Patient/caregiver response: pt tolerated well    Plan for next visit: wound care and documentation    Other pertinent info: pt had 5L fluid removed via paracentisis yesterday

## 2024-04-22 ENCOUNTER — HOME CARE VISIT (OUTPATIENT)
Dept: HOME HEALTH SERVICES | Facility: HOME HEALTHCARE | Age: 89
End: 2024-04-22
Payer: MEDICARE

## 2024-04-22 VITALS
DIASTOLIC BLOOD PRESSURE: 72 MMHG | OXYGEN SATURATION: 100 % | TEMPERATURE: 97.2 F | SYSTOLIC BLOOD PRESSURE: 130 MMHG | HEART RATE: 66 BPM | RESPIRATION RATE: 18 BRPM

## 2024-04-22 VITALS
HEART RATE: 68 BPM | TEMPERATURE: 98.2 F | SYSTOLIC BLOOD PRESSURE: 142 MMHG | OXYGEN SATURATION: 100 % | DIASTOLIC BLOOD PRESSURE: 69 MMHG | RESPIRATION RATE: 16 BRPM

## 2024-04-22 PROCEDURE — G0299 HHS/HOSPICE OF RN EA 15 MIN: HCPCS

## 2024-04-22 PROCEDURE — G0156 HHCP-SVS OF AIDE,EA 15 MIN: HCPCS

## 2024-04-22 NOTE — HOME HEALTH
Routine Visit Note:    Skill/education provided: Wound care and assessment/CP assessment    Patient/caregiver response: pt tolerated well    Plan for next visit:Wound care and assessment/CP assessment    Other pertinent info: pt has wound clinic appt Friday

## 2024-04-24 ENCOUNTER — HOME CARE VISIT (OUTPATIENT)
Dept: HOME HEALTH SERVICES | Facility: HOME HEALTHCARE | Age: 89
End: 2024-04-24
Payer: MEDICARE

## 2024-04-24 PROCEDURE — G0299 HHS/HOSPICE OF RN EA 15 MIN: HCPCS

## 2024-04-25 VITALS
HEART RATE: 51 BPM | DIASTOLIC BLOOD PRESSURE: 68 MMHG | OXYGEN SATURATION: 97 % | TEMPERATURE: 98.1 F | SYSTOLIC BLOOD PRESSURE: 122 MMHG

## 2024-04-26 ENCOUNTER — HOME CARE VISIT (OUTPATIENT)
Dept: HOME HEALTH SERVICES | Facility: HOME HEALTHCARE | Age: 89
End: 2024-04-26
Payer: MEDICARE

## 2024-04-27 ENCOUNTER — HOSPITAL ENCOUNTER (INPATIENT)
Facility: HOSPITAL | Age: 89
LOS: 4 days | Discharge: HOSPICE | End: 2024-05-01
Attending: EMERGENCY MEDICINE | Admitting: INTERNAL MEDICINE
Payer: MEDICARE

## 2024-04-27 ENCOUNTER — APPOINTMENT (OUTPATIENT)
Dept: GENERAL RADIOLOGY | Facility: HOSPITAL | Age: 89
End: 2024-04-27
Payer: MEDICARE

## 2024-04-27 ENCOUNTER — APPOINTMENT (OUTPATIENT)
Dept: CT IMAGING | Facility: HOSPITAL | Age: 89
End: 2024-04-27
Payer: MEDICARE

## 2024-04-27 DIAGNOSIS — R41.0 CONFUSION: Primary | ICD-10-CM

## 2024-04-27 DIAGNOSIS — S81.802A WOUND OF LEFT LOWER EXTREMITY, INITIAL ENCOUNTER: ICD-10-CM

## 2024-04-27 DIAGNOSIS — I48.92 ATRIAL FLUTTER WITH CONTROLLED RESPONSE: ICD-10-CM

## 2024-04-27 DIAGNOSIS — N17.9 AKI (ACUTE KIDNEY INJURY): ICD-10-CM

## 2024-04-27 DIAGNOSIS — R79.89 ELEVATED TROPONIN: ICD-10-CM

## 2024-04-27 DIAGNOSIS — Z87.19 HISTORY OF CIRRHOSIS: ICD-10-CM

## 2024-04-27 DIAGNOSIS — D64.9 ANEMIA, UNSPECIFIED TYPE: ICD-10-CM

## 2024-04-27 DIAGNOSIS — R65.10 SIRS (SYSTEMIC INFLAMMATORY RESPONSE SYNDROME): ICD-10-CM

## 2024-04-27 DIAGNOSIS — S81.801A WOUND OF RIGHT LOWER EXTREMITY, INITIAL ENCOUNTER: ICD-10-CM

## 2024-04-27 PROBLEM — L97.429 DIABETIC ULCER OF LEFT HEEL ASSOCIATED WITH TYPE 2 DIABETES MELLITUS: Status: ACTIVE | Noted: 2024-04-27

## 2024-04-27 PROBLEM — E83.42 HYPOMAGNESEMIA: Status: ACTIVE | Noted: 2024-04-27

## 2024-04-27 PROBLEM — I73.9 PVD (PERIPHERAL VASCULAR DISEASE): Status: ACTIVE | Noted: 2024-04-27

## 2024-04-27 PROBLEM — N18.30 CKD (CHRONIC KIDNEY DISEASE) STAGE 3, GFR 30-59 ML/MIN: Status: ACTIVE | Noted: 2024-04-27

## 2024-04-27 PROBLEM — G93.41 METABOLIC ENCEPHALOPATHY: Status: ACTIVE | Noted: 2024-04-27

## 2024-04-27 PROBLEM — Z79.01 CHRONIC ANTICOAGULATION: Status: ACTIVE | Noted: 2024-04-27

## 2024-04-27 PROBLEM — E11.621 DIABETIC ULCER OF LEFT HEEL ASSOCIATED WITH TYPE 2 DIABETES MELLITUS: Status: ACTIVE | Noted: 2024-04-27

## 2024-04-27 PROBLEM — Z86.73 HX-TIA (TRANSIENT ISCHEMIC ATTACK): Status: ACTIVE | Noted: 2024-04-27

## 2024-04-27 PROBLEM — K74.60 CIRRHOSIS OF LIVER: Status: ACTIVE | Noted: 2024-04-27

## 2024-04-27 PROBLEM — I50.9 CHF (CONGESTIVE HEART FAILURE): Status: ACTIVE | Noted: 2024-04-27

## 2024-04-27 LAB
ALBUMIN SERPL-MCNC: 3 G/DL (ref 3.5–5.2)
ALBUMIN/GLOB SERPL: 1 G/DL
ALP SERPL-CCNC: 222 U/L (ref 39–117)
ALT SERPL W P-5'-P-CCNC: 9 U/L (ref 1–41)
AMMONIA BLD-SCNC: 11 UMOL/L (ref 16–60)
ANION GAP SERPL CALCULATED.3IONS-SCNC: 12 MMOL/L (ref 5–15)
ANION GAP SERPL CALCULATED.3IONS-SCNC: 13 MMOL/L (ref 5–15)
AST SERPL-CCNC: 18 U/L (ref 1–40)
BASOPHILS # BLD AUTO: 0.05 10*3/MM3 (ref 0–0.2)
BASOPHILS NFR BLD AUTO: 0.3 % (ref 0–1.5)
BILIRUB SERPL-MCNC: 1 MG/DL (ref 0–1.2)
BILIRUB UR QL STRIP: NEGATIVE
BUN SERPL-MCNC: 51 MG/DL (ref 8–23)
BUN SERPL-MCNC: 51 MG/DL (ref 8–23)
BUN/CREAT SERPL: 22.7 (ref 7–25)
BUN/CREAT SERPL: 24.4 (ref 7–25)
CALCIUM SPEC-SCNC: 8.6 MG/DL (ref 8.2–9.6)
CALCIUM SPEC-SCNC: 8.8 MG/DL (ref 8.2–9.6)
CHLORIDE SERPL-SCNC: 102 MMOL/L (ref 98–107)
CHLORIDE SERPL-SCNC: 103 MMOL/L (ref 98–107)
CLARITY UR: CLEAR
CO2 SERPL-SCNC: 23 MMOL/L (ref 22–29)
CO2 SERPL-SCNC: 24 MMOL/L (ref 22–29)
COLOR UR: YELLOW
CREAT SERPL-MCNC: 2.09 MG/DL (ref 0.76–1.27)
CREAT SERPL-MCNC: 2.25 MG/DL (ref 0.76–1.27)
CRP SERPL-MCNC: 19.16 MG/DL (ref 0–0.5)
D-LACTATE SERPL-SCNC: 1.8 MMOL/L (ref 0.5–2)
DEPRECATED RDW RBC AUTO: 50.6 FL (ref 37–54)
EGFRCR SERPLBLD CKD-EPI 2021: 26.9 ML/MIN/1.73
EGFRCR SERPLBLD CKD-EPI 2021: 29.3 ML/MIN/1.73
EOSINOPHIL # BLD AUTO: 0 10*3/MM3 (ref 0–0.4)
EOSINOPHIL NFR BLD AUTO: 0 % (ref 0.3–6.2)
ERYTHROCYTE [DISTWIDTH] IN BLOOD BY AUTOMATED COUNT: 15.9 % (ref 12.3–15.4)
GEN 5 2HR TROPONIN T REFLEX: 134 NG/L
GLOBULIN UR ELPH-MCNC: 3 GM/DL
GLUCOSE BLDC GLUCOMTR-MCNC: 141 MG/DL (ref 70–130)
GLUCOSE SERPL-MCNC: 124 MG/DL (ref 65–99)
GLUCOSE SERPL-MCNC: 134 MG/DL (ref 65–99)
GLUCOSE UR STRIP-MCNC: NEGATIVE MG/DL
HCT VFR BLD AUTO: 32 % (ref 37.5–51)
HGB BLD-MCNC: 10.5 G/DL (ref 13–17.7)
HGB UR QL STRIP.AUTO: NEGATIVE
HOLD SPECIMEN: NORMAL
IMM GRANULOCYTES # BLD AUTO: 0.05 10*3/MM3 (ref 0–0.05)
IMM GRANULOCYTES NFR BLD AUTO: 0.3 % (ref 0–0.5)
KETONES UR QL STRIP: NEGATIVE
LEUKOCYTE ESTERASE UR QL STRIP.AUTO: NEGATIVE
LYMPHOCYTES # BLD AUTO: 1.88 10*3/MM3 (ref 0.7–3.1)
LYMPHOCYTES NFR BLD AUTO: 11.9 % (ref 19.6–45.3)
MAGNESIUM SERPL-MCNC: 1.5 MG/DL (ref 1.7–2.3)
MAGNESIUM SERPL-MCNC: 1.5 MG/DL (ref 1.7–2.3)
MCH RBC QN AUTO: 28.8 PG (ref 26.6–33)
MCHC RBC AUTO-ENTMCNC: 32.8 G/DL (ref 31.5–35.7)
MCV RBC AUTO: 87.9 FL (ref 79–97)
MONOCYTES # BLD AUTO: 1.24 10*3/MM3 (ref 0.1–0.9)
MONOCYTES NFR BLD AUTO: 7.9 % (ref 5–12)
MRSA DNA SPEC QL NAA+PROBE: NEGATIVE
NEUTROPHILS NFR BLD AUTO: 12.54 10*3/MM3 (ref 1.7–7)
NEUTROPHILS NFR BLD AUTO: 79.6 % (ref 42.7–76)
NITRITE UR QL STRIP: NEGATIVE
NRBC BLD AUTO-RTO: 0 /100 WBC (ref 0–0.2)
PH UR STRIP.AUTO: 5.5 [PH] (ref 5–8)
PLATELET # BLD AUTO: 352 10*3/MM3 (ref 140–450)
PMV BLD AUTO: 9.5 FL (ref 6–12)
POTASSIUM SERPL-SCNC: 4.6 MMOL/L (ref 3.5–5.2)
POTASSIUM SERPL-SCNC: 4.8 MMOL/L (ref 3.5–5.2)
PROCALCITONIN SERPL-MCNC: 1.52 NG/ML (ref 0–0.25)
PROT SERPL-MCNC: 6 G/DL (ref 6–8.5)
PROT UR QL STRIP: ABNORMAL
RBC # BLD AUTO: 3.64 10*6/MM3 (ref 4.14–5.8)
SODIUM SERPL-SCNC: 138 MMOL/L (ref 136–145)
SODIUM SERPL-SCNC: 139 MMOL/L (ref 136–145)
SP GR UR STRIP: 1.02 (ref 1–1.03)
TROPONIN T DELTA: 0 NG/L
TROPONIN T SERPL HS-MCNC: 134 NG/L
TSH SERPL DL<=0.05 MIU/L-ACNC: 2.61 UIU/ML (ref 0.27–4.2)
UROBILINOGEN UR QL STRIP: ABNORMAL
WBC NRBC COR # BLD AUTO: 15.76 10*3/MM3 (ref 3.4–10.8)
WHOLE BLOOD HOLD COAG: NORMAL
WHOLE BLOOD HOLD SPECIMEN: NORMAL

## 2024-04-27 PROCEDURE — 80053 COMPREHEN METABOLIC PANEL: CPT | Performed by: EMERGENCY MEDICINE

## 2024-04-27 PROCEDURE — 82948 REAGENT STRIP/BLOOD GLUCOSE: CPT

## 2024-04-27 PROCEDURE — 25010000002 VANCOMYCIN HCL IN NACL 2-0.9 GM/500ML-% SOLUTION: Performed by: EMERGENCY MEDICINE

## 2024-04-27 PROCEDURE — 74176 CT ABD & PELVIS W/O CONTRAST: CPT

## 2024-04-27 PROCEDURE — 83735 ASSAY OF MAGNESIUM: CPT | Performed by: EMERGENCY MEDICINE

## 2024-04-27 PROCEDURE — 25010000002 ALBUMIN HUMAN 25% PER 50 ML: Performed by: FAMILY MEDICINE

## 2024-04-27 PROCEDURE — 87147 CULTURE TYPE IMMUNOLOGIC: CPT | Performed by: EMERGENCY MEDICINE

## 2024-04-27 PROCEDURE — 86140 C-REACTIVE PROTEIN: CPT | Performed by: FAMILY MEDICINE

## 2024-04-27 PROCEDURE — 84145 PROCALCITONIN (PCT): CPT | Performed by: EMERGENCY MEDICINE

## 2024-04-27 PROCEDURE — P9612 CATHETERIZE FOR URINE SPEC: HCPCS

## 2024-04-27 PROCEDURE — 99285 EMERGENCY DEPT VISIT HI MDM: CPT

## 2024-04-27 PROCEDURE — 87154 CUL TYP ID BLD PTHGN 6+ TRGT: CPT | Performed by: EMERGENCY MEDICINE

## 2024-04-27 PROCEDURE — 85025 COMPLETE CBC W/AUTO DIFF WBC: CPT | Performed by: EMERGENCY MEDICINE

## 2024-04-27 PROCEDURE — 82140 ASSAY OF AMMONIA: CPT | Performed by: EMERGENCY MEDICINE

## 2024-04-27 PROCEDURE — 93005 ELECTROCARDIOGRAM TRACING: CPT | Performed by: EMERGENCY MEDICINE

## 2024-04-27 PROCEDURE — 25010000002 MAGNESIUM SULFATE IN D5W 1G/100ML (PREMIX) 1-5 GM/100ML-% SOLUTION: Performed by: FAMILY MEDICINE

## 2024-04-27 PROCEDURE — P9047 ALBUMIN (HUMAN), 25%, 50ML: HCPCS | Performed by: FAMILY MEDICINE

## 2024-04-27 PROCEDURE — 87040 BLOOD CULTURE FOR BACTERIA: CPT | Performed by: EMERGENCY MEDICINE

## 2024-04-27 PROCEDURE — 25010000002 PIPERACILLIN SOD-TAZOBACTAM PER 1 G: Performed by: FAMILY MEDICINE

## 2024-04-27 PROCEDURE — 70450 CT HEAD/BRAIN W/O DYE: CPT

## 2024-04-27 PROCEDURE — 84484 ASSAY OF TROPONIN QUANT: CPT | Performed by: EMERGENCY MEDICINE

## 2024-04-27 PROCEDURE — 25010000002 PIPERACILLIN SOD-TAZOBACTAM PER 1 G: Performed by: EMERGENCY MEDICINE

## 2024-04-27 PROCEDURE — 84443 ASSAY THYROID STIM HORMONE: CPT | Performed by: FAMILY MEDICINE

## 2024-04-27 PROCEDURE — 71045 X-RAY EXAM CHEST 1 VIEW: CPT

## 2024-04-27 PROCEDURE — 25810000003 LACTATED RINGERS PER 1000 ML: Performed by: FAMILY MEDICINE

## 2024-04-27 PROCEDURE — 83605 ASSAY OF LACTIC ACID: CPT | Performed by: EMERGENCY MEDICINE

## 2024-04-27 PROCEDURE — 87641 MR-STAPH DNA AMP PROBE: CPT

## 2024-04-27 PROCEDURE — 83735 ASSAY OF MAGNESIUM: CPT | Performed by: FAMILY MEDICINE

## 2024-04-27 PROCEDURE — 81003 URINALYSIS AUTO W/O SCOPE: CPT | Performed by: EMERGENCY MEDICINE

## 2024-04-27 PROCEDURE — 71250 CT THORAX DX C-: CPT

## 2024-04-27 PROCEDURE — 36415 COLL VENOUS BLD VENIPUNCTURE: CPT

## 2024-04-27 RX ORDER — NICOTINE POLACRILEX 4 MG
15 LOZENGE BUCCAL
Status: DISCONTINUED | OUTPATIENT
Start: 2024-04-27 | End: 2024-05-01 | Stop reason: HOSPADM

## 2024-04-27 RX ORDER — BISACODYL 10 MG
10 SUPPOSITORY, RECTAL RECTAL DAILY PRN
Status: DISCONTINUED | OUTPATIENT
Start: 2024-04-27 | End: 2024-05-01 | Stop reason: HOSPADM

## 2024-04-27 RX ORDER — MELOXICAM 7.5 MG/1
TABLET ORAL
COMMUNITY
Start: 2024-04-01 | End: 2024-05-01 | Stop reason: HOSPADM

## 2024-04-27 RX ORDER — MAGNESIUM SULFATE 1 G/100ML
1 INJECTION INTRAVENOUS
Status: COMPLETED | OUTPATIENT
Start: 2024-04-27 | End: 2024-04-27

## 2024-04-27 RX ORDER — BISACODYL 5 MG/1
5 TABLET, DELAYED RELEASE ORAL DAILY PRN
Status: DISCONTINUED | OUTPATIENT
Start: 2024-04-27 | End: 2024-05-01 | Stop reason: HOSPADM

## 2024-04-27 RX ORDER — NITROGLYCERIN 0.4 MG/1
0.4 TABLET SUBLINGUAL
Status: DISCONTINUED | OUTPATIENT
Start: 2024-04-27 | End: 2024-05-01 | Stop reason: HOSPADM

## 2024-04-27 RX ORDER — SODIUM CHLORIDE 0.9 % (FLUSH) 0.9 %
10 SYRINGE (ML) INJECTION AS NEEDED
Status: DISCONTINUED | OUTPATIENT
Start: 2024-04-27 | End: 2024-05-01 | Stop reason: HOSPADM

## 2024-04-27 RX ORDER — IBUPROFEN 600 MG/1
1 TABLET ORAL
Status: DISCONTINUED | OUTPATIENT
Start: 2024-04-27 | End: 2024-05-01 | Stop reason: HOSPADM

## 2024-04-27 RX ORDER — AMLODIPINE BESYLATE 5 MG/1
5 TABLET ORAL DAILY
Status: DISCONTINUED | OUTPATIENT
Start: 2024-04-28 | End: 2024-05-01 | Stop reason: HOSPADM

## 2024-04-27 RX ORDER — ALBUMIN (HUMAN) 12.5 G/50ML
25 SOLUTION INTRAVENOUS ONCE
Status: COMPLETED | OUTPATIENT
Start: 2024-04-27 | End: 2024-04-27

## 2024-04-27 RX ORDER — ASPIRIN 81 MG/1
81 TABLET ORAL DAILY
Status: DISCONTINUED | OUTPATIENT
Start: 2024-04-27 | End: 2024-04-29

## 2024-04-27 RX ORDER — QUETIAPINE FUMARATE 25 MG/1
12.5 TABLET, FILM COATED ORAL 2 TIMES DAILY PRN
Status: DISCONTINUED | OUTPATIENT
Start: 2024-04-27 | End: 2024-05-01 | Stop reason: HOSPADM

## 2024-04-27 RX ORDER — ONDANSETRON 2 MG/ML
4 INJECTION INTRAMUSCULAR; INTRAVENOUS EVERY 6 HOURS PRN
Status: DISCONTINUED | OUTPATIENT
Start: 2024-04-27 | End: 2024-05-01 | Stop reason: HOSPADM

## 2024-04-27 RX ORDER — INSULIN LISPRO 100 [IU]/ML
2-7 INJECTION, SOLUTION INTRAVENOUS; SUBCUTANEOUS
Status: DISCONTINUED | OUTPATIENT
Start: 2024-04-27 | End: 2024-04-29

## 2024-04-27 RX ORDER — SODIUM CHLORIDE 0.9 % (FLUSH) 0.9 %
10 SYRINGE (ML) INJECTION EVERY 12 HOURS SCHEDULED
Status: DISCONTINUED | OUTPATIENT
Start: 2024-04-27 | End: 2024-05-01 | Stop reason: HOSPADM

## 2024-04-27 RX ORDER — VANCOMYCIN 2 GRAM/500 ML IN 0.9 % SODIUM CHLORIDE INTRAVENOUS
22 ONCE
Qty: 500 ML | Refills: 0 | Status: COMPLETED | OUTPATIENT
Start: 2024-04-27 | End: 2024-04-27

## 2024-04-27 RX ORDER — MORPHINE SULFATE 2 MG/ML
1 INJECTION, SOLUTION INTRAMUSCULAR; INTRAVENOUS EVERY 4 HOURS PRN
Status: DISCONTINUED | OUTPATIENT
Start: 2024-04-27 | End: 2024-05-01 | Stop reason: HOSPADM

## 2024-04-27 RX ORDER — ATORVASTATIN CALCIUM 40 MG/1
80 TABLET, FILM COATED ORAL DAILY
Status: DISCONTINUED | OUTPATIENT
Start: 2024-04-27 | End: 2024-05-01 | Stop reason: HOSPADM

## 2024-04-27 RX ORDER — POLYETHYLENE GLYCOL 3350 17 G/17G
17 POWDER, FOR SOLUTION ORAL DAILY PRN
Status: DISCONTINUED | OUTPATIENT
Start: 2024-04-27 | End: 2024-05-01 | Stop reason: HOSPADM

## 2024-04-27 RX ORDER — HALOPERIDOL 5 MG/ML
2 INJECTION INTRAMUSCULAR EVERY 4 HOURS PRN
Status: DISCONTINUED | OUTPATIENT
Start: 2024-04-27 | End: 2024-05-01 | Stop reason: HOSPADM

## 2024-04-27 RX ORDER — NALOXONE HCL 0.4 MG/ML
0.4 VIAL (ML) INJECTION
Status: DISCONTINUED | OUTPATIENT
Start: 2024-04-27 | End: 2024-05-01 | Stop reason: HOSPADM

## 2024-04-27 RX ORDER — SODIUM CHLORIDE, SODIUM LACTATE, POTASSIUM CHLORIDE, CALCIUM CHLORIDE 600; 310; 30; 20 MG/100ML; MG/100ML; MG/100ML; MG/100ML
100 INJECTION, SOLUTION INTRAVENOUS CONTINUOUS
Status: DISCONTINUED | OUTPATIENT
Start: 2024-04-27 | End: 2024-04-28

## 2024-04-27 RX ORDER — MULTIPLE VITAMINS W/ MINERALS TAB 9MG-400MCG
1 TAB ORAL DAILY
Status: DISCONTINUED | OUTPATIENT
Start: 2024-04-27 | End: 2024-05-01 | Stop reason: HOSPADM

## 2024-04-27 RX ORDER — AMOXICILLIN 250 MG
2 CAPSULE ORAL 2 TIMES DAILY
Status: DISCONTINUED | OUTPATIENT
Start: 2024-04-27 | End: 2024-05-01 | Stop reason: HOSPADM

## 2024-04-27 RX ORDER — PANTOPRAZOLE SODIUM 40 MG/1
40 TABLET, DELAYED RELEASE ORAL
Status: DISCONTINUED | OUTPATIENT
Start: 2024-04-28 | End: 2024-04-29

## 2024-04-27 RX ORDER — LEVOTHYROXINE SODIUM 137 UG/1
137 TABLET ORAL EVERY MORNING
Status: DISCONTINUED | OUTPATIENT
Start: 2024-04-28 | End: 2024-05-01 | Stop reason: HOSPADM

## 2024-04-27 RX ORDER — SODIUM CHLORIDE 9 MG/ML
40 INJECTION, SOLUTION INTRAVENOUS AS NEEDED
Status: DISCONTINUED | OUTPATIENT
Start: 2024-04-27 | End: 2024-05-01 | Stop reason: HOSPADM

## 2024-04-27 RX ORDER — DEXTROSE MONOHYDRATE 25 G/50ML
25 INJECTION, SOLUTION INTRAVENOUS
Status: DISCONTINUED | OUTPATIENT
Start: 2024-04-27 | End: 2024-05-01 | Stop reason: HOSPADM

## 2024-04-27 RX ORDER — MEMANTINE HYDROCHLORIDE 10 MG/1
5 TABLET ORAL 2 TIMES DAILY
Status: DISCONTINUED | OUTPATIENT
Start: 2024-04-27 | End: 2024-05-01 | Stop reason: HOSPADM

## 2024-04-27 RX ADMIN — APIXABAN 2.5 MG: 2.5 TABLET, FILM COATED ORAL at 20:19

## 2024-04-27 RX ADMIN — SODIUM CHLORIDE, POTASSIUM CHLORIDE, SODIUM LACTATE AND CALCIUM CHLORIDE 100 ML/HR: 600; 310; 30; 20 INJECTION, SOLUTION INTRAVENOUS at 19:08

## 2024-04-27 RX ADMIN — PIPERACILLIN AND TAZOBACTAM 3.38 G: 3; .375 INJECTION, POWDER, LYOPHILIZED, FOR SOLUTION INTRAVENOUS at 22:46

## 2024-04-27 RX ADMIN — ASPIRIN 81 MG: 81 TABLET, COATED ORAL at 20:19

## 2024-04-27 RX ADMIN — PIPERACILLIN SODIUM AND TAZOBACTAM SODIUM 3.38 G: 3; .375 INJECTION, POWDER, LYOPHILIZED, FOR SOLUTION INTRAVENOUS at 15:38

## 2024-04-27 RX ADMIN — ALBUMIN (HUMAN) 25 G: 0.25 INJECTION, SOLUTION INTRAVENOUS at 18:14

## 2024-04-27 RX ADMIN — Medication 2000 MG: at 16:29

## 2024-04-27 RX ADMIN — MAGNESIUM SULFATE 1 G: 1 INJECTION INTRAVENOUS at 21:28

## 2024-04-27 RX ADMIN — Medication 1 TABLET: at 20:19

## 2024-04-27 RX ADMIN — Medication 10 ML: at 20:11

## 2024-04-27 RX ADMIN — MAGNESIUM SULFATE 1 G: 1 INJECTION INTRAVENOUS at 18:39

## 2024-04-27 RX ADMIN — ATORVASTATIN CALCIUM 80 MG: 40 TABLET, FILM COATED ORAL at 20:19

## 2024-04-27 RX ADMIN — SENNOSIDES AND DOCUSATE SODIUM 2 TABLET: 8.6; 5 TABLET ORAL at 20:19

## 2024-04-27 RX ADMIN — MEMANTINE 5 MG: 10 TABLET ORAL at 20:19

## 2024-04-27 RX ADMIN — MAGNESIUM SULFATE 1 G: 1 INJECTION INTRAVENOUS at 20:19

## 2024-04-27 NOTE — H&P
[facility] Medicine Services  HISTORY AND PHYSICAL    Patient Name: Onel Clark  : 3/25/1933  MRN: [id number]  Primary Care Physician: Ilir Fair MD  Date of admission: 2024      Subjective   Subjective     Chief Complaint:  Confusion     HPI:  Onel Clark is a 89 y.o. male with past medical history of cirrhosis getting periodic outpatient paracentesis, diabetes mellitus type 2, history of TIA, atrial flutter on chronic anticoagulation, PVD, CKD 3, and baseline chronic debility related to his advanced age.  At baseline patient lives at home with his wife, his son Jose Juan lives nearby and comes over for several hours every day to help with patient's care.  He remains ambulatory but with assistance and only small distances.    Brought to [facility] ED for confusion and restlessness which was unusual for patient.  Starting last evening patient was more restless and had minimal sleep through the night, he woke this morning and wanted to sit to the side of the bed but had overall general weakness and was able able to get up.  Patient's son Jose Juan had to come over to assist with patient's mobility and patient was found to be confused from his normal baseline.  He has a known history of cirrhosis and requires paracentesis, family became concerned that he possibly had increasing ascites or hyperammonemia.  Patient remembers the events and denies shortness of breath, chest pain, sense of doom, diaphoresis, nausea, focal deficits.     In the emergency department evaluation was overall unremarkable.  Patient had stable vitals, no evidence of UTI or pneumonia, normal ammonia.  The most significant findings were hypomagnesemia, elevated CRP and white count of 15 with a left shift however no overt signs of infection.  Patient's mental status has returned to baseline and he is alert, oriented, and conversant.          Personal History     Past Medical History:    Diagnosis Date    Acquired hypothyroidism     Allergic     Allergies     Arthritis     Benign prostatic hyperplasia with nocturia     Bilateral carotid artery disease     Cancer of the skin, basal cell     Cataract     CHF (congestive heart failure)     Chronic non-seasonal allergic rhinitis     DUE TO POLLEN    Chronic renal impairment     Cirrhosis of liver 04/27/2024    CKD (chronic kidney disease) stage 3, GFR 30-59 ml/min 04/27/2024    COPD (chronic obstructive pulmonary disease)     Coronary artery disease     Decreased hearing of both ears     Degenerative lumbar spinal stenosis     Diabetes mellitus     Elevated PSA     Frequent falls     GERD without esophagitis     Heart murmur     High risk medication use     HL (hearing loss)     Hx of bilateral hip replacements     Hx of decompressive lumbar laminectomy     Hx of total knee replacement, bilateral     Hx of transient ischemic attack (TIA)     Hypertension     Mixed hyperlipidemia due to type 2 diabetes mellitus     Nicotine dependence     No natural teeth     FALSE TEETH    Osteoarthritis     Primary hypertension     Primary osteoarthritis involving multiple joints     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 2 diabetes mellitus     PVD (peripheral vascular disease) 04/27/2024    Stroke TIA  2 years ago    Thyroid disease     TIA (transient ischemic attack)     Type 2 diabetes mellitus with diabetic polyneuropathy     Type 2 diabetes mellitus with stage 2 chronic kidney disease, without long-term current use of insulin            Past Surgical History:   Procedure Laterality Date    BACK SURGERY  2007    Lumbar Laminectomy    CARPAL TUNNEL RELEASE  2012    COLONOSCOPY      EYE SURGERY  cataracts    JOINT REPLACEMENT  both hips & knee & back    REPLACEMENT TOTAL KNEE Left 2013    SPINE SURGERY  about 15 yrs ago    TOTAL HIP ARTHROPLASTY Right 2016       Family History: family history includes Alzheimer's disease in his mother; Arthritis in his brother and sister;  Coronary artery disease in his father; Diabetes in his brother, mother, and sister; Hearing loss in his brother and father; Heart attack in his father; Hyperlipidemia in his father, mother, and sister; Hypertension in his sister; Stroke in his father.     Social History:  reports that he quit smoking about 24 years ago. His smoking use included cigarettes. He started smoking about 34 years ago. He has a 10 pack-year smoking history. He has been exposed to tobacco smoke. He has never used smokeless tobacco. He reports current alcohol use of about 1.0 standard drink of alcohol per week. He reports that he does not use drugs.  Social History     Social History Narrative    Lives in [place], [place] at home with spouse        Medications:  Available home medication information reviewed.  Aspirin, Calcium Carb-Cholecalciferol, Cetirizine HCl, Magnesium, amLODIPine, apixaban, atorvastatin, bumetanide, levothyroxine, losartan, meloxicam, memantine, metFORMIN, multivitamin with minerals, omeprazole, and vitamin C    No Known Allergies    Objective   Objective     Vital Signs:   Temp:  [97.6 °F (36.4 °C)-98.8 °F (37.1 °C)] 97.6 °F (36.4 °C)  Heart Rate:  [63-65] 64  Resp:  [16] 16  BP: (111-132)/(57-66) 114/57       Physical Exam   Constitutional: No acute distress, awake, alert, nontoxic, normal body habitus  Eyes: Pupils equal, sclerae anicteric, no conjunctival injection  HENT: NCAT, mucous membranes moist  Respiratory: Clear to auscultation bilaterally with dull bases, good effort, nonlabored respirations   Cardiovascular: RRR, 2/6 murmur  Gastrointestinal: Positive bowel sounds, soft, nontender, nondistended, no ascitic wave  Musculoskeletal: No peripheral edema, normal muscle tone for age  Psychiatric: Appropriate affect, good insight and judgement, cooperative  Neurologic: Oriented x 4, movements symmetric BUE and BLE, Cranial Nerves grossly intact, speech clear and fluent  Skin: Left first toe and left heel dry  noninfected diabetic ulcers covered with Betadine and dry gauze.      LAB RESULTS:      Lab 04/27/24  1538 04/27/24  1257   WBC  --  15.76*   HEMOGLOBIN  --  10.5*   HEMATOCRIT  --  32.0*   PLATELETS  --  352   NEUTROS ABS  --  12.54*   IMMATURE GRANS (ABS)  --  0.05   LYMPHS ABS  --  1.88   MONOS ABS  --  1.24*   EOS ABS  --  0.00   MCV  --  87.9   CRP 19.16*  --    PROCALCITONIN  --  1.52*   LACTATE  --  1.8         Lab 04/27/24  1755 04/27/24  1538 04/27/24  1257   SODIUM 139  --  138   POTASSIUM 4.6  --  4.8   CHLORIDE 103  --  102   CO2 23.0  --  24.0   ANION GAP 13.0  --  12.0   BUN 51*  --  51*   CREATININE 2.09*  --  2.25*   EGFR 29.3*  --  26.9*   GLUCOSE 124*  --  134*   CALCIUM 8.6  --  8.8   MAGNESIUM 1.5*  --  1.5*   TSH  --  2.610  --          Lab 04/27/24  1257   TOTAL PROTEIN 6.0   ALBUMIN 3.0*   GLOBULIN 3.0   ALT (SGPT) 9   AST (SGOT) 18   BILIRUBIN 1.0   ALK PHOS 222*         Lab 04/27/24  1538 04/27/24  1257   HSTROP T 134* 134*                 UA          1/3/2024    11:00 4/27/2024    13:35   Urinalysis   Specific [place], UA  1.018    Ketones, UA Trace  Negative    Blood, UA  Negative    Leukocytes, UA Negative  Negative    Nitrite, UA  Negative        Microbiology Results (last 10 days)       Procedure Component Value - Date/Time    MRSA Screen, PCR (Inpatient) - Swab, Nares [[id number]]  (Normal) Collected: 04/27/24 1401    Lab Status: Final result Specimen: Swab from Nares Updated: 04/27/24 1522     MRSA PCR Negative    Narrative:      The negative predictive value of this diagnostic test is high and should only be used to consider de-escalating anti-MRSA therapy. A positive result may indicate colonization with MRSA and must be correlated clinically.  MRSA Negative            CT Chest Without Contrast Diagnostic    Result Date: 4/27/2024  CT HEAD WO CONTRAST, CT CHEST WO CONTRAST DIAGNOSTIC, CT ABDOMEN PELVIS WO CONTRAST Date of Exam: 4/27/2024 2:51 PM EDT Indication: AMS, confusion.  Comparison: 12/4/2023 chest CT and 1/3/2024 PET/CT Technique: Axial CT images were obtained of the head, chest, abdomen, and pelvis without contrast administration.  Automated exposure control and iterative construction methods were used. Findings: CT HEAD: There is no evidence of acute intracranial hemorrhage, mass, midline shift, loss of gray-white matter differentiation, or extra-axial fluid collection. Subcortical periventricular white matter hypodensities are present consistent with small vessel ischemic disease. There is extensive global parenchymal volume loss with ex vacuo dilation of the ventricular system. The basal cisterns are patent. There is no fracture or suspicious osseous lesion. Mild mucosal thickening of the bilateral maxillary sinuses. Partial mastoid air cell effusions. Bilateral ocular surgery. Soft tissues are unremarkable. CT CHEST: The central airways are patent. There are hypoventilatory changes of the lung bases, left greater than right, with associated left lower lobe rounded atelectasis. This is unchanged from recent PET/CT. There is no new focal airspace consolidation. No new suspicious pulmonary nodule or mass. Unchanged small left pleural effusion, likely chronic given associated pleural wall thickening. Unchanged cardiomegaly. Dense atherosclerotic calcifications of the coronary arteries and aortic valve. There are also dense thoracic aortic calcifications. Trace pericardial effusion. No mediastinal mass or threshold lymphadenopathy. Chest wall soft tissues show no acute abnormality. Gynecomastia. No evidence of chest wall fracture or suspicious osseous lesion. CT ABDOMEN/PELVIS: Cirrhotic morphology of the liver. No suspicious hepatic lesion. The gallbladder is unremarkable. No significant biliary ductal dilation. The spleen, pancreas, and bilateral adrenal glands show no acute abnormality. There is a small right superior pole renal cyst, unchanged. Bilateral renal parenchymal  scarring/thinning. No hydronephrosis or nephrolithiasis. No suspicious renal lesion. Small hiatal hernia. Thickening of the gastric wall which may be secondary to under distention. There is no evidence of bowel obstruction. Colonic diverticulosis without definite evidence of diverticulitis. Appendix is not well visualized and may be surgically absent or decompressed. There is no evidence of suspicious lymphadenopathy. There are shotty retroperitoneal and mesenteric lymph nodes which are unchanged from January 2024 PET/CT. There is moderate abdominopelvic ascites with autumn mesentery. No evidence of abdominal aortic aneurysm. Dense atherosclerotic calcifications of the aorta and branch vessels. The urinary bladder and prostate is difficult to evaluate given adjacent metallic artifact, however is grossly unremarkable. The abdominal and pelvic wall soft tissues show no acute abnormality. There is mild diffuse fatty muscle atrophy without discrete fluid collection. Bilateral hip arthroplasties. Cerclage fixation of the right intertrochanteric region. Chronic compression deformity of L1 vertebral body. No evidence of acute fracture or suspicious bony lesion.     Impression: Impression: CT HEAD: No acute intracranial abnormality. Chronic sequela of probable small vessel ischemic disease and severe global parenchymal volume loss. CT CHEST: No acute abnormality in the chest. Bilateral hypoventilatory changes with round atelectasis in the left lung base. Chronic adjacent small left pleural effusion with pleural wall thickening, not significantly changed from December 2023 chest CT.  Chronic ancillary findings as above. CT ABDOMEN/PELVIS: No acute abnormality in the abdomen or pelvis. Cirrhosis with moderate abdominopelvic ascites. Chronic ancillary findings as above. Electronically Signed: Kenroy Mcneil MD  4/27/2024 3:30 PM EDT  Workstation ID: [id number]    CT Abdomen Pelvis Without Contrast    Result Date: 4/27/2024  CT HEAD  WO CONTRAST, CT CHEST WO CONTRAST DIAGNOSTIC, CT ABDOMEN PELVIS WO CONTRAST Date of Exam: 4/27/2024 2:51 PM EDT Indication: AMS, confusion. Comparison: 12/4/2023 chest CT and 1/3/2024 PET/CT Technique: Axial CT images were obtained of the head, chest, abdomen, and pelvis without contrast administration.  Automated exposure control and iterative construction methods were used. Findings: CT HEAD: There is no evidence of acute intracranial hemorrhage, mass, midline shift, loss of gray-white matter differentiation, or extra-axial fluid collection. Subcortical periventricular white matter hypodensities are present consistent with small vessel ischemic disease. There is extensive global parenchymal volume loss with ex vacuo dilation of the ventricular system. The basal cisterns are patent. There is no fracture or suspicious osseous lesion. Mild mucosal thickening of the bilateral maxillary sinuses. Partial mastoid air cell effusions. Bilateral ocular surgery. Soft tissues are unremarkable. CT CHEST: The central airways are patent. There are hypoventilatory changes of the lung bases, left greater than right, with associated left lower lobe rounded atelectasis. This is unchanged from recent PET/CT. There is no new focal airspace consolidation. No new suspicious pulmonary nodule or mass. Unchanged small left pleural effusion, likely chronic given associated pleural wall thickening. Unchanged cardiomegaly. Dense atherosclerotic calcifications of the coronary arteries and aortic valve. There are also dense thoracic aortic calcifications. Trace pericardial effusion. No mediastinal mass or threshold lymphadenopathy. Chest wall soft tissues show no acute abnormality. Gynecomastia. No evidence of chest wall fracture or suspicious osseous lesion. CT ABDOMEN/PELVIS: Cirrhotic morphology of the liver. No suspicious hepatic lesion. The gallbladder is unremarkable. No significant biliary ductal dilation. The spleen, pancreas, and  bilateral adrenal glands show no acute abnormality. There is a small right superior pole renal cyst, unchanged. Bilateral renal parenchymal scarring/thinning. No hydronephrosis or nephrolithiasis. No suspicious renal lesion. Small hiatal hernia. Thickening of the gastric wall which may be secondary to under distention. There is no evidence of bowel obstruction. Colonic diverticulosis without definite evidence of diverticulitis. Appendix is not well visualized and may be surgically absent or decompressed. There is no evidence of suspicious lymphadenopathy. There are shotty retroperitoneal and mesenteric lymph nodes which are unchanged from January 2024 PET/CT. There is moderate abdominopelvic ascites with autumn mesentery. No evidence of abdominal aortic aneurysm. Dense atherosclerotic calcifications of the aorta and branch vessels. The urinary bladder and prostate is difficult to evaluate given adjacent metallic artifact, however is grossly unremarkable. The abdominal and pelvic wall soft tissues show no acute abnormality. There is mild diffuse fatty muscle atrophy without discrete fluid collection. Bilateral hip arthroplasties. Cerclage fixation of the right intertrochanteric region. Chronic compression deformity of L1 vertebral body. No evidence of acute fracture or suspicious bony lesion.     Impression: Impression: CT HEAD: No acute intracranial abnormality. Chronic sequela of probable small vessel ischemic disease and severe global parenchymal volume loss. CT CHEST: No acute abnormality in the chest. Bilateral hypoventilatory changes with round atelectasis in the left lung base. Chronic adjacent small left pleural effusion with pleural wall thickening, not significantly changed from December 2023 chest CT.  Chronic ancillary findings as above. CT ABDOMEN/PELVIS: No acute abnormality in the abdomen or pelvis. Cirrhosis with moderate abdominopelvic ascites. Chronic ancillary findings as above. Electronically  Signed: Kenroy Mcneil MD  4/27/2024 3:30 PM EDT  Workstation ID: [id number]    CT Head Without Contrast    Result Date: 4/27/2024  CT HEAD WO CONTRAST, CT CHEST WO CONTRAST DIAGNOSTIC, CT ABDOMEN PELVIS WO CONTRAST Date of Exam: 4/27/2024 2:51 PM EDT Indication: AMS, confusion. Comparison: 12/4/2023 chest CT and 1/3/2024 PET/CT Technique: Axial CT images were obtained of the head, chest, abdomen, and pelvis without contrast administration.  Automated exposure control and iterative construction methods were used. Findings: CT HEAD: There is no evidence of acute intracranial hemorrhage, mass, midline shift, loss of gray-white matter differentiation, or extra-axial fluid collection. Subcortical periventricular white matter hypodensities are present consistent with small vessel ischemic disease. There is extensive global parenchymal volume loss with ex vacuo dilation of the ventricular system. The basal cisterns are patent. There is no fracture or suspicious osseous lesion. Mild mucosal thickening of the bilateral maxillary sinuses. Partial mastoid air cell effusions. Bilateral ocular surgery. Soft tissues are unremarkable. CT CHEST: The central airways are patent. There are hypoventilatory changes of the lung bases, left greater than right, with associated left lower lobe rounded atelectasis. This is unchanged from recent PET/CT. There is no new focal airspace consolidation. No new suspicious pulmonary nodule or mass. Unchanged small left pleural effusion, likely chronic given associated pleural wall thickening. Unchanged cardiomegaly. Dense atherosclerotic calcifications of the coronary arteries and aortic valve. There are also dense thoracic aortic calcifications. Trace pericardial effusion. No mediastinal mass or threshold lymphadenopathy. Chest wall soft tissues show no acute abnormality. Gynecomastia. No evidence of chest wall fracture or suspicious osseous lesion. CT ABDOMEN/PELVIS: Cirrhotic morphology of the  liver. No suspicious hepatic lesion. The gallbladder is unremarkable. No significant biliary ductal dilation. The spleen, pancreas, and bilateral adrenal glands show no acute abnormality. There is a small right superior pole renal cyst, unchanged. Bilateral renal parenchymal scarring/thinning. No hydronephrosis or nephrolithiasis. No suspicious renal lesion. Small hiatal hernia. Thickening of the gastric wall which may be secondary to under distention. There is no evidence of bowel obstruction. Colonic diverticulosis without definite evidence of diverticulitis. Appendix is not well visualized and may be surgically absent or decompressed. There is no evidence of suspicious lymphadenopathy. There are shotty retroperitoneal and mesenteric lymph nodes which are unchanged from January 2024 PET/CT. There is moderate abdominopelvic ascites with autumn mesentery. No evidence of abdominal aortic aneurysm. Dense atherosclerotic calcifications of the aorta and branch vessels. The urinary bladder and prostate is difficult to evaluate given adjacent metallic artifact, however is grossly unremarkable. The abdominal and pelvic wall soft tissues show no acute abnormality. There is mild diffuse fatty muscle atrophy without discrete fluid collection. Bilateral hip arthroplasties. Cerclage fixation of the right intertrochanteric region. Chronic compression deformity of L1 vertebral body. No evidence of acute fracture or suspicious bony lesion.     Impression: Impression: CT HEAD: No acute intracranial abnormality. Chronic sequela of probable small vessel ischemic disease and severe global parenchymal volume loss. CT CHEST: No acute abnormality in the chest. Bilateral hypoventilatory changes with round atelectasis in the left lung base. Chronic adjacent small left pleural effusion with pleural wall thickening, not significantly changed from December 2023 chest CT.  Chronic ancillary findings as above. CT ABDOMEN/PELVIS: No acute  abnormality in the abdomen or pelvis. Cirrhosis with moderate abdominopelvic ascites. Chronic ancillary findings as above. Electronically Signed: Kenroy Mcneil MD  4/27/2024 3:30 PM EDT  Workstation ID: [id number]    XR Chest 1 View    Result Date: 4/27/2024  XR CHEST 1 VW Date of Exam: 4/27/2024 12:50 PM EDT Indication: Weak/Dizzy/AMS triage protocol Comparison: PET/CT January 3, 2024, chest CT December 4, 2023, chest radiographs November 15, 2023. Findings: Left mid and lower lung pleural and parenchymal opacities appear similar to prior exams. There is a suspected small left pleural effusion component. No right lung infiltrate or pleural effusion. No significant pneumothorax component is identified. Heart size appears within normal limits. There is atherosclerosis of the aorta.     Impression: Impression: 1.Left mid and lower lung pleural and parenchymal opacities appear grossly similar to recent prior studies. 2.No definite radiographic findings of acute cardiopulmonary abnormality. Electronically Signed: Samir Sanchez  4/27/2024 1:24 PM EDT  Workstation ID: [id number]         Assessment & Plan   Assessment & Plan       Metabolic encephalopathy    Type 2 diabetes mellitus without complication, without long-term current use of insulin    Primary hypertension    Atrial flutter with controlled response    Chronic anticoagulation    PVD (peripheral vascular disease)    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 2 diabetes mellitus    Cirrhosis of liver    Hx-TIA (transient ischemic attack)    CHF (no ECHO on file currently)    Elevated serum creatinine    CKD (chronic kidney disease) stage 3, GFR 30-59 ml/min    Hypomagnesemia    Metabolic encephalopathy  - resolved, patient very conversant and oriented currently  - ammonia = normal   - no overt evidence of infectious source but has elevated CRP and WBC's  - was on outpt PO Doxy for left heel & toe diabetic ulcer but currently does not appear infected  - continue  "empiric Zosyn and Vanc started in ED however low threshold to convert back to his PO Doxy after WOC evals wounds and documents  - hx of Aflutter, MRI brain ordered to rule out embolic dz given vague confusion presentation to rule out embolic phenomena    Elevated creatinine  CKD III  - moderate ascities  - hold home diuretics for now while give some oncotic support and get intravascularly replenished  - give 25g albumin IV with 1L LR over 10 hours  - reasses morning BMP    Cirrhosis  - has needed outpt paracentesis more often  - currently abd soft without dyspnea, moderate ascites noted on imaging  - follows with [facility]  - family interested in standing outpt paracentesis orders to decrease difficulty keeping patient hepatically homeostatic    - at last GI appt there was discussion about palliative tenkhoff  - courtesy consult to GI to help set up outpt needs an avert readmission   - GOC are comfort-based overall, agreeable to Hospice discussions    Diabetic left foot wound 1st toe and heel   - noninfected appearing  - continue dry gauze wraps  - WOC    NIDDM2  - LDSSI    PVD  Hx of TIAs  HTN  HL  Aflutter on chronic anticoagulation  -continue Eliquis  - not on rate medications per home meds  - continue ASA 81mg and Lipitor 80mg     Senile cognitive impairment  At risk for hospital delirium  - takes Namenda at home   - delirium mitigation pathway enacted      DVT prophylaxis:  Medical DVT prophylaxis orders are present.      Discussed findings and plan of care with patient's son Jose Juan by phone at approximately 9:30 PM.  Jose Juan helps care for patient in home during daytime hours, patient lives with his wife still in their own home.  He has limited mobility but does walk from the bed to the living room and restroom with assistance.     Long discussion with patient, he has a sense of guilt for his son having to care for him daily.  Patient was stating \" I have lived good life and I think it is my time soon, and I do not " "have a problem with that\".  Patient was not receptive to the idea of SNF rehab, he feels he is \"not at that place in my life where I am going to get better anymore\".  Very realistic and comfortable with this conversation.    HOSPICE CONSULT IN PLACE TO HELP WITH LONG-TERM DISPO OPTIONS BASED ON HIS GOC AND AGE.    CODE STATUS:    Code Status and Medical Interventions:   Ordered at: 04/27/24 214     Medical Intervention Limits:    NO intubation (DNI)    NO cardioversion    NO antiarrhythmic drugs    NO dialysis    NO artificial nutrition     Code Status (Patient has no pulse and is not breathing):    No CPR (Do Not Attempt to Resuscitate)     Medical Interventions (Patient has pulse or is breathing):    Limited Support       Expected Discharge   Expected discharge date/ time has not been documented.     Rajwinder Freeman MD  04/27/24    "

## 2024-04-27 NOTE — ED PROVIDER NOTES
Subjective   History of Present Illness  Patient is a pleasant 89-year-old male with history of cirrhosis, CHF, COPD, diabetes, frequent falls, TIA.  Presents to the ED with altered mental status and fatigue.  Lives at home with wife and son.  Apparently he is typically fully oriented but today is confused.  Family also felt his abdomen was more distended than usual.  EMS describes it as hard and rigid but at the time of his arrival to the ED it is soft.  Possible mild distention.  Patient had decreased p.o. intake today.  History is limited as patient is confused and unable to supply any reliable history.    Patient does take Eliquis.    Denies fever, chills, chest pain, shortness of breath, vomiting, diarrhea, or other acute complaints.  Last bowel movement is not known.        Review of Systems   All other systems reviewed and are negative.      Past Medical History:   Diagnosis Date    Acquired hypothyroidism     Allergic     Allergies     Arthritis     Benign prostatic hyperplasia with nocturia     Bilateral carotid artery disease     Cancer of the skin, basal cell     Cataract     CHF (congestive heart failure)     Chronic non-seasonal allergic rhinitis     DUE TO POLLEN    Chronic renal impairment     Cirrhosis of liver 04/27/2024    CKD (chronic kidney disease) stage 3, GFR 30-59 ml/min 04/27/2024    COPD (chronic obstructive pulmonary disease)     Coronary artery disease     Decreased hearing of both ears     Degenerative lumbar spinal stenosis     Diabetes mellitus     Elevated PSA     Frequent falls     GERD without esophagitis     Heart murmur     High risk medication use     HL (hearing loss)     Hx of bilateral hip replacements     Hx of decompressive lumbar laminectomy     Hx of total knee replacement, bilateral     Hx of transient ischemic attack (TIA)     Hypertension     Mixed hyperlipidemia due to type 2 diabetes mellitus     Nicotine dependence     No natural teeth     FALSE TEETH     Osteoarthritis     Primary hypertension     Primary osteoarthritis involving multiple joints     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 2 diabetes mellitus     PVD (peripheral vascular disease) 2024    Stroke TIA  2 years ago    Thyroid disease     TIA (transient ischemic attack)     Type 2 diabetes mellitus with diabetic polyneuropathy     Type 2 diabetes mellitus with stage 2 chronic kidney disease, without long-term current use of insulin        No Known Allergies    Past Surgical History:   Procedure Laterality Date    BACK SURGERY  2007    Lumbar Laminectomy    CARPAL TUNNEL RELEASE      COLONOSCOPY      EYE SURGERY  cataracts    JOINT REPLACEMENT  both hips & knee & back    REPLACEMENT TOTAL KNEE Left 2013    SPINE SURGERY  about 15 yrs ago    TOTAL HIP ARTHROPLASTY Right 2016       Family History   Problem Relation Age of Onset    Alzheimer's disease Mother     Diabetes Mother     Hyperlipidemia Mother     Heart attack Father     Stroke Father     Coronary artery disease Father     Hearing loss Father     Hyperlipidemia Father     Diabetes Sister     Hypertension Sister     Hyperlipidemia Sister     Arthritis Sister     Arthritis Brother     Diabetes Brother     Hearing loss Brother        Social History     Socioeconomic History    Marital status:     Number of children: 1   Tobacco Use    Smoking status: Former     Current packs/day: 0.00     Average packs/day: 1 pack/day for 10.0 years (10.0 ttl pk-yrs)     Types: Cigarettes     Start date:      Quit date:      Years since quittin.3     Passive exposure: Past    Smokeless tobacco: Never    Tobacco comments:     None   Vaping Use    Vaping status: Never Used   Substance and Sexual Activity    Alcohol use: Yes     Alcohol/week: 1.0 standard drink of alcohol     Types: 1 Glasses of wine per week     Comment: None    Drug use: No    Sexual activity: Not Currently     Partners: Male     Comment: With  (male) until lately - old age problems            Objective   Physical Exam  Vitals and nursing note reviewed.   Constitutional:       Appearance: He is ill-appearing.   HENT:      Head: Normocephalic and atraumatic.      Mouth/Throat:      Mouth: Mucous membranes are dry.   Eyes:      Extraocular Movements: Extraocular movements intact.      Pupils: Pupils are equal, round, and reactive to light.   Cardiovascular:      Rate and Rhythm: Normal rate and regular rhythm.      Heart sounds: Murmur heard.   Pulmonary:      Effort: Pulmonary effort is normal.      Breath sounds: Normal breath sounds.   Abdominal:      General: Bowel sounds are normal.      Palpations: Abdomen is soft.   Musculoskeletal:         General: Normal range of motion.      Cervical back: Normal range of motion.      Comments: Bilateral shoulder discomfort, baseline.  Arthritis consistent with patient's age.   Skin:     General: Skin is warm and dry.      Capillary Refill: Capillary refill takes less than 2 seconds.      Comments: Chronic appearing wounds to the left ankle, right heel, and left great toe.  Left great toe also has erythematous.  Appears to be a chronic finding   Neurological:      Mental Status: He is alert. He is confused.      GCS: GCS eye subscore is 4. GCS verbal subscore is 4. GCS motor subscore is 6.      Comments: Generalized weakness.  No focal deficit.  Patient has difficulty raising his arms off the bed secondary to arthritis in his shoulders but  strength is intact bilaterally   Psychiatric:         Behavior: Behavior normal.         Procedures           ED Course      Recent Results (from the past 24 hour(s))   ECG 12 Lead ED Triage Standing Order; Weak / Dizzy / AMS    Collection Time: 04/27/24 12:45 PM   Result Value Ref Range    QT Interval 468 ms    QTC Interval 490 ms   Comprehensive Metabolic Panel    Collection Time: 04/27/24 12:57 PM    Specimen: Blood   Result Value Ref Range    Glucose 134 (H) 65 - 99 mg/dL    BUN 51 (H) 8 - 23 mg/dL     Creatinine 2.25 (H) 0.76 - 1.27 mg/dL    Sodium 138 136 - 145 mmol/L    Potassium 4.8 3.5 - 5.2 mmol/L    Chloride 102 98 - 107 mmol/L    CO2 24.0 22.0 - 29.0 mmol/L    Calcium 8.8 8.2 - 9.6 mg/dL    Total Protein 6.0 6.0 - 8.5 g/dL    Albumin 3.0 (L) 3.5 - 5.2 g/dL    ALT (SGPT) 9 1 - 41 U/L    AST (SGOT) 18 1 - 40 U/L    Alkaline Phosphatase 222 (H) 39 - 117 U/L    Total Bilirubin 1.0 0.0 - 1.2 mg/dL    Globulin 3.0 gm/dL    A/G Ratio 1.0 g/dL    BUN/Creatinine Ratio 22.7 7.0 - 25.0    Anion Gap 12.0 5.0 - 15.0 mmol/L    eGFR 26.9 (L) >60.0 mL/min/1.73   Single High Sensitivity Troponin T    Collection Time: 04/27/24 12:57 PM    Specimen: Blood   Result Value Ref Range    HS Troponin T 134 (C) <22 ng/L   Magnesium    Collection Time: 04/27/24 12:57 PM    Specimen: Blood   Result Value Ref Range    Magnesium 1.5 (L) 1.7 - 2.3 mg/dL   Green Top (Gel)    Collection Time: 04/27/24 12:57 PM   Result Value Ref Range    Extra Tube Hold for add-ons.    Lavender Top    Collection Time: 04/27/24 12:57 PM   Result Value Ref Range    Extra Tube hold for add-on    Gold Top - SST    Collection Time: 04/27/24 12:57 PM   Result Value Ref Range    Extra Tube Hold for add-ons.    Gray Top    Collection Time: 04/27/24 12:57 PM   Result Value Ref Range    Extra Tube Hold for add-ons.    Light Blue Top    Collection Time: 04/27/24 12:57 PM   Result Value Ref Range    Extra Tube Hold for add-ons.    CBC Auto Differential    Collection Time: 04/27/24 12:57 PM    Specimen: Blood   Result Value Ref Range    WBC 15.76 (H) 3.40 - 10.80 10*3/mm3    RBC 3.64 (L) 4.14 - 5.80 10*6/mm3    Hemoglobin 10.5 (L) 13.0 - 17.7 g/dL    Hematocrit 32.0 (L) 37.5 - 51.0 %    MCV 87.9 79.0 - 97.0 fL    MCH 28.8 26.6 - 33.0 pg    MCHC 32.8 31.5 - 35.7 g/dL    RDW 15.9 (H) 12.3 - 15.4 %    RDW-SD 50.6 37.0 - 54.0 fl    MPV 9.5 6.0 - 12.0 fL    Platelets 352 140 - 450 10*3/mm3    Neutrophil % 79.6 (H) 42.7 - 76.0 %    Lymphocyte % 11.9 (L) 19.6 - 45.3 %     Monocyte % 7.9 5.0 - 12.0 %    Eosinophil % 0.0 (L) 0.3 - 6.2 %    Basophil % 0.3 0.0 - 1.5 %    Immature Grans % 0.3 0.0 - 0.5 %    Neutrophils, Absolute 12.54 (H) 1.70 - 7.00 10*3/mm3    Lymphocytes, Absolute 1.88 0.70 - 3.10 10*3/mm3    Monocytes, Absolute 1.24 (H) 0.10 - 0.90 10*3/mm3    Eosinophils, Absolute 0.00 0.00 - 0.40 10*3/mm3    Basophils, Absolute 0.05 0.00 - 0.20 10*3/mm3    Immature Grans, Absolute 0.05 0.00 - 0.05 10*3/mm3    nRBC 0.0 0.0 - 0.2 /100 WBC   Lactic Acid, Plasma    Collection Time: 04/27/24 12:57 PM    Specimen: Blood   Result Value Ref Range    Lactate 1.8 0.5 - 2.0 mmol/L   Procalcitonin    Collection Time: 04/27/24 12:57 PM    Specimen: Blood   Result Value Ref Range    Procalcitonin 1.52 (H) 0.00 - 0.25 ng/mL   Urinalysis With Culture If Indicated - Urine, Catheter In/Out    Collection Time: 04/27/24  1:35 PM    Specimen: Urine, Catheter In/Out   Result Value Ref Range    Color, UA Yellow Yellow, Straw    Appearance, UA Clear Clear    pH, UA 5.5 5.0 - 8.0    Specific Gravity, UA 1.018 1.001 - 1.030    Glucose, UA Negative Negative    Ketones, UA Negative Negative    Bilirubin, UA Negative Negative    Blood, UA Negative Negative    Protein, UA Trace (A) Negative    Leuk Esterase, UA Negative Negative    Nitrite, UA Negative Negative    Urobilinogen, UA 0.2 E.U./dL 0.2 - 1.0 E.U./dL   MRSA Screen, PCR (Inpatient) - Swab, Nares    Collection Time: 04/27/24  2:01 PM    Specimen: Nares; Swab   Result Value Ref Range    MRSA PCR Negative Negative   Ammonia    Collection Time: 04/27/24  2:35 PM    Specimen: Blood   Result Value Ref Range    Ammonia 11 (L) 16 - 60 umol/L   High Sensitivity Troponin T 2Hr    Collection Time: 04/27/24  3:38 PM    Specimen: Blood   Result Value Ref Range    HS Troponin T 134 (C) <22 ng/L    Troponin T Delta 0 >=-4 - <+4 ng/L     Note: In addition to lab results from this visit, the labs listed above may include labs taken at another facility or during a  different encounter within the last 24 hours. Please correlate lab times with ED admission and discharge times for further clarification of the services performed during this visit.    CT Chest Without Contrast Diagnostic   Final Result   Impression:   CT HEAD: No acute intracranial abnormality. Chronic sequela of probable small vessel ischemic disease and severe global parenchymal volume loss.      CT CHEST: No acute abnormality in the chest. Bilateral hypoventilatory changes with round atelectasis in the left lung base. Chronic adjacent small left pleural effusion with pleural wall thickening, not significantly changed from December 2023 chest CT.    Chronic ancillary findings as above.      CT ABDOMEN/PELVIS: No acute abnormality in the abdomen or pelvis. Cirrhosis with moderate abdominopelvic ascites. Chronic ancillary findings as above.            Electronically Signed: Kenroy Mcneil MD     4/27/2024 3:30 PM EDT     Workstation ID: [id number]      CT Abdomen Pelvis Without Contrast   Final Result   Impression:   CT HEAD: No acute intracranial abnormality. Chronic sequela of probable small vessel ischemic disease and severe global parenchymal volume loss.      CT CHEST: No acute abnormality in the chest. Bilateral hypoventilatory changes with round atelectasis in the left lung base. Chronic adjacent small left pleural effusion with pleural wall thickening, not significantly changed from December 2023 chest CT.    Chronic ancillary findings as above.      CT ABDOMEN/PELVIS: No acute abnormality in the abdomen or pelvis. Cirrhosis with moderate abdominopelvic ascites. Chronic ancillary findings as above.            Electronically Signed: Kenroy Mcneil MD     4/27/2024 3:30 PM EDT     Workstation ID: [id number]      CT Head Without Contrast   Final Result   Impression:   CT HEAD: No acute intracranial abnormality. Chronic sequela of probable small vessel ischemic disease and severe global parenchymal volume loss.      CT  "CHEST: No acute abnormality in the chest. Bilateral hypoventilatory changes with round atelectasis in the left lung base. Chronic adjacent small left pleural effusion with pleural wall thickening, not significantly changed from December 2023 chest CT.    Chronic ancillary findings as above.      CT ABDOMEN/PELVIS: No acute abnormality in the abdomen or pelvis. Cirrhosis with moderate abdominopelvic ascites. Chronic ancillary findings as above.            Electronically Signed: Kenroy Mcneil MD     4/27/2024 3:30 PM EDT     Workstation ID: [id number]      XR Chest 1 View   Final Result   Impression:   1.Left mid and lower lung pleural and parenchymal opacities appear grossly similar to recent prior studies.   2.No definite radiographic findings of acute cardiopulmonary abnormality.         Electronically Signed: Samir Sanchez     4/27/2024 1:24 PM EDT     Workstation ID: [id number]      MRI Brain Without Contrast    (Results Pending)     Vitals:    04/27/24 1547 04/27/24 1600 04/27/24 1630 04/27/24 1734   BP:  123/62 123/62 125/59   Patient Position:       Pulse: 64 64 65 65   Resp:       Temp:    97.7 °F (36.5 °C)   TempSrc:    Oral   SpO2: 95% 100% 100% 99%   Weight:    83.8 kg (184 lb 12.8 oz)   Height:    182.9 cm (72\")     Medications   sodium chloride 0.9 % flush 10 mL (has no administration in time range)   vancomycin IVPB 2000 mg in 0.9% Sodium Chloride 500 mL (2,000 mg Intravenous New Bag 4/27/24 1629)   amLODIPine (NORVASC) tablet 5 mg (has no administration in time range)   apixaban (ELIQUIS) tablet 2.5 mg (has no administration in time range)   aspirin EC tablet 81 mg (has no administration in time range)   atorvastatin (LIPITOR) tablet 80 mg (has no administration in time range)   levothyroxine (SYNTHROID, LEVOTHROID) tablet 137 mcg (has no administration in time range)   memantine (NAMENDA) tablet 5 mg (has no administration in time range)   pantoprazole (PROTONIX) EC tablet 40 mg (has no administration in " time range)   sodium chloride 0.9 % flush 10 mL (has no administration in time range)   sodium chloride 0.9 % flush 10 mL (has no administration in time range)   sodium chloride 0.9 % infusion 40 mL (has no administration in time range)   dextrose (GLUTOSE) oral gel 15 g (has no administration in time range)   dextrose (D50W) (25 g/50 mL) IV injection 25 g (has no administration in time range)   glucagon (GLUCAGEN) injection 1 mg (has no administration in time range)   Insulin Lispro (humaLOG) injection 2-7 Units ( Subcutaneous Not Given 4/27/24 1807)   nitroglycerin (NITROSTAT) SL tablet 0.4 mg (has no administration in time range)   albumin human 25 % IV SOLN 25 g (25 g Intravenous New Bag 4/27/24 1814)   lactated ringers infusion (has no administration in time range)   Potassium Replacement - Follow Nurse / BPA Driven Protocol (has no administration in time range)   Magnesium Standard Dose Replacement - Follow Nurse / BPA Driven Protocol (has no administration in time range)   Phosphorus Replacement - Follow Nurse / BPA Driven Protocol (has no administration in time range)   Calcium Replacement - Follow Nurse / BPA Driven Protocol (has no administration in time range)   sennosides-docusate (PERICOLACE) 8.6-50 MG per tablet 2 tablet (has no administration in time range)     And   polyethylene glycol (MIRALAX) packet 17 g (has no administration in time range)     And   bisacodyl (DULCOLAX) EC tablet 5 mg (has no administration in time range)     And   bisacodyl (DULCOLAX) suppository 10 mg (has no administration in time range)   morphine injection 1 mg (has no administration in time range)     And   naloxone (NARCAN) injection 0.4 mg (has no administration in time range)   ondansetron (ZOFRAN) injection 4 mg (has no administration in time range)   multivitamin with minerals 1 tablet (has no administration in time range)   QUEtiapine (SEROquel) tablet 12.5 mg (has no administration in time range)   haloperidol  lactate (HALDOL) injection 2 mg (has no administration in time range)   magnesium sulfate in D5W 1g/100mL (PREMIX) (has no administration in time range)   piperacillin-tazobactam (ZOSYN) 3.375 g IVPB in 100 mL NS MBP (CD) (0 g Intravenous Stopped 4/27/24 1627)     ECG/EMG Results (last 24 hours)       Procedure Component Value Units Date/Time    ECG 12 Lead ED Triage Standing Order; Weak / Dizzy / AMS [[id number]] Collected: 04/27/24 1245     Updated: 04/27/24 1252     QT Interval 468 ms      QTC Interval 490 ms     Narrative:      Test Reason : ED Triage Standing Order~  Blood Pressure :   */*   mmHG  Vent. Rate :  66 BPM     Atrial Rate : 288 BPM     P-R Int :   * ms          QRS Dur :  68 ms      QT Int : 468 ms       P-R-T Axes :   * -38 -75 degrees     QTc Int : 490 ms    Accelerated Junctional rhythm  Left axis deviation  Pulmonary disease pattern  Inferior infarct , age undetermined  ST & T wave abnormality, consider lateral ischemia  Abnormal ECG  No previous ECGs available    Referred By: ED MD           Confirmed By:           ECG 12 Lead ED Triage Standing Order; Weak / Dizzy / AMS   Preliminary Result   Test Reason : ED Triage Standing Order~   Blood Pressure :   */*   mmHG   Vent. Rate :  66 BPM     Atrial Rate : 288 BPM      P-R Int :   * ms          QRS Dur :  68 ms       QT Int : 468 ms       P-R-T Axes :   * -38 -75 degrees      QTc Int : 490 ms      Accelerated Junctional rhythm   Left axis deviation   Pulmonary disease pattern   Inferior infarct , age undetermined   ST & T wave abnormality, consider lateral ischemia   Abnormal ECG   No previous ECGs available      Referred By: ED MD           Confirmed By:                                                  Medical Decision Making  Patient is an ill 89-year-old.  The white count is elevated.  Infectious source is suspected although not definitively identified as of yet.  Case discussed with internal medicine attending.  Patient will be admitted for  further evaluation and management.    Problems Addressed:  CHICO (acute kidney injury): complicated acute illness or injury  Anemia, unspecified type: complicated acute illness or injury  Confusion: complicated acute illness or injury  Elevated troponin: complicated acute illness or injury  History of cirrhosis: complicated acute illness or injury  SIRS (systemic inflammatory response syndrome): complicated acute illness or injury  Wound of left lower extremity, initial encounter: complicated acute illness or injury  Wound of right lower extremity, initial encounter: complicated acute illness or injury    Amount and/or Complexity of Data Reviewed  Independent Historian: EMS  External Data Reviewed: notes.  Labs: ordered. Decision-making details documented in ED Course.  Radiology: ordered and independent interpretation performed. Decision-making details documented in ED Course.  ECG/medicine tests: ordered.    Risk  Prescription drug management.  Decision regarding hospitalization.        Final diagnoses:   Confusion   Wound of left lower extremity, initial encounter   Wound of right lower extremity, initial encounter   History of cirrhosis   Elevated troponin   SIRS (systemic inflammatory response syndrome)   CHICO (acute kidney injury)   Anemia, unspecified type   Atrial flutter with controlled response       ED Disposition  ED Disposition       ED Disposition   Decision to Admit    Condition   --    Comment   Level of Care: Telemetry [5]   Diagnosis: Metabolic encephalopathy [348.31.ICD-9-CM]   Admitting Physician: TAINA SANCHEZ [[id number]]                  Juan Arciniega DO  04/27/24 1819

## 2024-04-27 NOTE — Clinical Note
Level of Care: Telemetry [5]   Diagnosis: Metabolic encephalopathy [348.31.ICD-9-CM]   Admitting Physician: TAINA SANCHEZ [[id number]]   Certification: I Certify That Inpatient Hospital Services Are Medically Necessary For Greater Than 2 Midnights

## 2024-04-27 NOTE — LETTER
EMS Transport Request  For use at [facility], [place], [place], [place], and [place] only   Patient Name: Onel Clark : 3/25/1933   Weight:90.3 kg (199 lb) Pick-up Location: [facility] BLS/ALS: BLS/ALS: BLS   Insurance: MEDICARE Auth End Date:      Pre-Cert #: D/C Summary complete:    Destination: Home How many stairs 1, Will the patient be on the main level yes, Is there a ramp available no, Can the patient stand and pivot no, Address [address] [facility] , and Name/contact number for who will be present Jose Juan [phone]   Contact Precautions: None   Equipment (O2, Fluids, etc.): None   Arrive By Date/Time:   1300 or after Stretcher/WC: Stretcher   CM Requesting: Ana Cristina Mar RN Ext: [phone]   Notes/Medical Necessity: Pt is bedbound, unable to stand and pivot, has  wounds to the feet, increased weakness     ______________________________________________________________________    *Only 2 patient bags OR 1 carry-on size bag are permitted.  Wheelchairs and walkers CANNOT transported with the patient. Acknowledge: Yes

## 2024-04-27 NOTE — ED NOTES
[facility]    Nursing Report ED to Floor:  Mental status: A&O X 2  Ambulatory status: HAVE NOT HAD HIM UP TO AMBULATE. BASELINE WITH WALKER  Oxygen Therapy:  RA  Cardiac Rhythm: ACCELERATED JUNCTIONAL   Admitted from: HOME  Safety Concerns:  FALL RISK/ DIABETIC ULCERS  Social Issues: CONFUSED  ED Room #:  16    ED Nurse Phone Extension - [phone] or may call [phone].      HPI:   Chief Complaint   Patient presents with    Altered Mental Status       Past Medical History:  Past Medical History:   Diagnosis Date    Acquired hypothyroidism     Allergic     Allergies     Arthritis     Benign prostatic hyperplasia with nocturia     Bilateral carotid artery disease     Cancer of the skin, basal cell     Cataract     CHF (congestive heart failure)     Chronic non-seasonal allergic rhinitis     DUE TO POLLEN    Chronic renal impairment     COPD (chronic obstructive pulmonary disease)     Coronary artery disease     Decreased hearing of both ears     Degenerative lumbar spinal stenosis     Diabetes mellitus     Elevated PSA     Frequent falls     GERD without esophagitis     Heart murmur     High risk medication use     HL (hearing loss)     Hx of bilateral hip replacements     Hx of decompressive lumbar laminectomy     Hx of total knee replacement, bilateral     Hx of transient ischemic attack (TIA)     Hypertension     Mixed hyperlipidemia due to type 2 diabetes mellitus     Nicotine dependence     No natural teeth     FALSE TEETH    Osteoarthritis     Primary hypertension     Primary osteoarthritis involving multiple joints     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 2 diabetes mellitus     PVD (peripheral vascular disease) 04/27/2024    Stroke TIA  2 years ago    Thyroid disease     TIA (transient ischemic attack)     Type 2 diabetes mellitus with diabetic polyneuropathy     Type 2 diabetes mellitus with stage 2 chronic kidney disease, without long-term current use of insulin         Past Surgical History:  Past Surgical History:    Procedure Laterality Date    BACK SURGERY  2007    Lumbar Laminectomy    CARPAL TUNNEL RELEASE  2012    COLONOSCOPY      EYE SURGERY  cataracts    JOINT REPLACEMENT  both hips & knee & back    REPLACEMENT TOTAL KNEE Left 2013    SPINE SURGERY  about 15 yrs ago    TOTAL HIP ARTHROPLASTY Right 2016        Admitting Doctor:   Rajwinder Freeman MD    Consulting Provider(s):  Consults       No orders found from 3/29/2024 to 4/28/2024.             Admitting Diagnosis:   The primary encounter diagnosis was Confusion. Diagnoses of Wound of left lower extremity, initial encounter, Wound of right lower extremity, initial encounter, History of cirrhosis, Elevated troponin, SIRS (systemic inflammatory response syndrome), CHICO (acute kidney injury), and Anemia, unspecified type were also pertinent to this visit.    Most Recent Vitals:   Vitals:    04/27/24 1400 04/27/24 1430 04/27/24 1545 04/27/24 1547   BP: 111/62 132/66 115/63    Patient Position:       Pulse: 63 65  64   Resp:       Temp:       SpO2: 100% 100%  95%   Weight:       Height:           Active LDAs/IV Access:   Lines, Drains & Airways       Active LDAs       Name Placement date Placement time Site Days    Peripheral IV 04/27/24 1240 Anterior;Right Forearm 04/27/24  1240  Forearm  less than 1    Peripheral IV 04/27/24 1303 Anterior;Left Forearm 04/27/24  1303  Forearm  less than 1                    Labs (abnormal labs have a star):   Labs Reviewed   COMPREHENSIVE METABOLIC PANEL - Abnormal; Notable for the following components:       Result Value    Glucose 134 (*)     BUN 51 (*)     Creatinine 2.25 (*)     Albumin 3.0 (*)     Alkaline Phosphatase 222 (*)     eGFR 26.9 (*)     All other components within normal limits    Narrative:     GFR Normal >60  Chronic Kidney Disease <60  Kidney Failure <15    The GFR formula is only valid for adults with stable renal function between ages 18 and 70.   SINGLE HS TROPONIN T - Abnormal; Notable for the following components:    HS  "Troponin T 134 (*)     All other components within normal limits    Narrative:     High Sensitive Troponin T Reference Range:  <14.0 ng/L- Negative Female for AMI  <22.0 ng/L- Negative Male for AMI  >=14 - Abnormal Female indicating possible myocardial injury.  >=22 - Abnormal Male indicating possible myocardial injury.   Clinicians would have to utilize clinical acumen, EKG, Troponin, and serial changes to determine if it is an Acute Myocardial Infarction or myocardial injury due to an underlying chronic condition.        MAGNESIUM - Abnormal; Notable for the following components:    Magnesium 1.5 (*)     All other components within normal limits   CBC WITH AUTO DIFFERENTIAL - Abnormal; Notable for the following components:    WBC 15.76 (*)     RBC 3.64 (*)     Hemoglobin 10.5 (*)     Hematocrit 32.0 (*)     RDW 15.9 (*)     Neutrophil % 79.6 (*)     Lymphocyte % 11.9 (*)     Eosinophil % 0.0 (*)     Neutrophils, Absolute 12.54 (*)     Monocytes, Absolute 1.24 (*)     All other components within normal limits   PROCALCITONIN - Abnormal; Notable for the following components:    Procalcitonin 1.52 (*)     All other components within normal limits    Narrative:     As a Marker for Sepsis (Non-Neonates):    1. <0.5 ng/mL represents a low risk of severe sepsis and/or septic shock.  2. >2 ng/mL represents a high risk of severe sepsis and/or septic shock.    As a Marker for Lower Respiratory Tract Infections that require antibiotic therapy:    PCT on Admission    Antibiotic Therapy       6-12 Hrs later    >0.5                Strongly Recommended  >0.25 - <0.5        Recommended   0.1 - 0.25          Discouraged              Remeasure/reassess PCT  <0.1                Strongly Discouraged     Remeasure/reassess PCT    As 28 day mortality risk marker: \"Change in Procalcitonin Result\" (>80% or <=80%) if Day 0 (or Day 1) and Day 4 values are available. Refer to http://www.[employer]-pct-calculator.com    Change in PCT <=80%  A " decrease of PCT levels below or equal to 80% defines a positive change in PCT test result representing a higher risk for 28-day all-cause mortality of patients diagnosed with severe sepsis for septic shock.    Change in PCT >80%  A decrease of PCT levels of more than 80% defines a negative change in PCT result representing a lower risk for 28-day all-cause mortality of patients diagnosed with severe sepsis or septic shock.      AMMONIA - Abnormal; Notable for the following components:    Ammonia 11 (*)     All other components within normal limits   URINALYSIS W/ CULTURE IF INDICATED - Abnormal; Notable for the following components:    Protein, UA Trace (*)     All other components within normal limits    Narrative:     In absence of clinical symptoms, the presence of pyuria, bacteria, and/or nitrites on the urinalysis result does not correlate with infection.  Urine microscopic not indicated.   HIGH SENSITIVITIY TROPONIN T 2HR - Abnormal; Notable for the following components:    HS Troponin T 134 (*)     All other components within normal limits    Narrative:     High Sensitive Troponin T Reference Range:  <14.0 ng/L- Negative Female for AMI  <22.0 ng/L- Negative Male for AMI  >=14 - Abnormal Female indicating possible myocardial injury.  >=22 - Abnormal Male indicating possible myocardial injury.   Clinicians would have to utilize clinical acumen, EKG, Troponin, and serial changes to determine if it is an Acute Myocardial Infarction or myocardial injury due to an underlying chronic condition.        MRSA SCREEN, PCR - Normal    Narrative:     The negative predictive value of this diagnostic test is high and should only be used to consider de-escalating anti-MRSA therapy. A positive result may indicate colonization with MRSA and must be correlated clinically.  MRSA Negative   LACTIC ACID, PLASMA - Normal   BLOOD CULTURE   BLOOD CULTURE   RAINBOW DRAW    Narrative:     The following orders were created for panel  order [place] Draw.  Procedure                               Abnormality         Status                     ---------                               -----------         ------                     Green Top (Gel)[[id number]]                                  Final result               Lavender Top[[id number]]                                     Final result               Gold Top - SST[[id number]]                                   Final result               Gray Top[[id number]]                                         Final result               Light Blue Top[[id number]]                                   Final result                 Please view results for these tests on the individual orders.   CBC AND DIFFERENTIAL    Narrative:     The following orders were created for panel order CBC & Differential.  Procedure                               Abnormality         Status                     ---------                               -----------         ------                     CBC Auto Differential[[id number]]        Abnormal            Final result                 Please view results for these tests on the individual orders.   GREEN TOP   LAVENDER TOP   GOLD TOP - SST   GRAY TOP   LIGHT BLUE TOP       Meds Given in ED:   Medications   sodium chloride 0.9 % flush 10 mL (has no administration in time range)   vancomycin IVPB 2000 mg in 0.9% Sodium Chloride 500 mL (2,000 mg Intravenous New Bag 4/27/24 1629)   piperacillin-tazobactam (ZOSYN) 3.375 g IVPB in 100 mL NS MBP (CD) (0 g Intravenous Stopped 4/27/24 1627)           Last NIH score:                                                          Dysphagia screening results:  Patient Factors Component (Dysphagia:Stroke or Rule-out)  Best Eye Response: 4-->(E4) spontaneous (04/27/24 1321)  Best Motor Response: 6-->(M6) obeys commands (04/27/24 1321)  Best Verbal Response: 4-->(V4) confused (04/27/24 1321)  [place] Coma Scale Score: 14 (04/27/24 1321)     [place] Coma Scale:  No data  recorded     CIWA:        Restraint Type:            Isolation Status:  No active isolations

## 2024-04-28 ENCOUNTER — APPOINTMENT (OUTPATIENT)
Dept: MRI IMAGING | Facility: HOSPITAL | Age: 89
End: 2024-04-28
Payer: MEDICARE

## 2024-04-28 ENCOUNTER — HOME CARE VISIT (OUTPATIENT)
Dept: HOME HEALTH SERVICES | Facility: HOME HEALTHCARE | Age: 89
End: 2024-04-28
Payer: MEDICARE

## 2024-04-28 LAB
ANION GAP SERPL CALCULATED.3IONS-SCNC: 12 MMOL/L (ref 5–15)
BACTERIA BLD CULT: ABNORMAL
BASOPHILS # BLD AUTO: 0.05 10*3/MM3 (ref 0–0.2)
BASOPHILS NFR BLD AUTO: 0.3 % (ref 0–1.5)
BOTTLE TYPE: ABNORMAL
BUN SERPL-MCNC: 49 MG/DL (ref 8–23)
BUN/CREAT SERPL: 22.5 (ref 7–25)
CALCIUM SPEC-SCNC: 8.6 MG/DL (ref 8.2–9.6)
CHLORIDE SERPL-SCNC: 103 MMOL/L (ref 98–107)
CO2 SERPL-SCNC: 24 MMOL/L (ref 22–29)
CREAT SERPL-MCNC: 2.18 MG/DL (ref 0.76–1.27)
CREAT UR-MCNC: 124.4 MG/DL
DEPRECATED RDW RBC AUTO: 51.3 FL (ref 37–54)
EGFRCR SERPLBLD CKD-EPI 2021: 27.9 ML/MIN/1.73
EOSINOPHIL # BLD AUTO: 0 10*3/MM3 (ref 0–0.4)
EOSINOPHIL NFR BLD AUTO: 0 % (ref 0.3–6.2)
ERYTHROCYTE [DISTWIDTH] IN BLOOD BY AUTOMATED COUNT: 16.2 % (ref 12.3–15.4)
GLUCOSE BLDC GLUCOMTR-MCNC: 122 MG/DL (ref 70–130)
GLUCOSE BLDC GLUCOMTR-MCNC: 153 MG/DL (ref 70–130)
GLUCOSE BLDC GLUCOMTR-MCNC: 158 MG/DL (ref 70–130)
GLUCOSE BLDC GLUCOMTR-MCNC: 189 MG/DL (ref 70–130)
GLUCOSE SERPL-MCNC: 165 MG/DL (ref 65–99)
HCT VFR BLD AUTO: 30.9 % (ref 37.5–51)
HGB BLD-MCNC: 10.2 G/DL (ref 13–17.7)
IMM GRANULOCYTES # BLD AUTO: 0.06 10*3/MM3 (ref 0–0.05)
IMM GRANULOCYTES NFR BLD AUTO: 0.4 % (ref 0–0.5)
LYMPHOCYTES # BLD AUTO: 1.1 10*3/MM3 (ref 0.7–3.1)
LYMPHOCYTES NFR BLD AUTO: 7.3 % (ref 19.6–45.3)
MAGNESIUM SERPL-MCNC: 2.5 MG/DL (ref 1.7–2.3)
MCH RBC QN AUTO: 28.9 PG (ref 26.6–33)
MCHC RBC AUTO-ENTMCNC: 33 G/DL (ref 31.5–35.7)
MCV RBC AUTO: 87.5 FL (ref 79–97)
MONOCYTES # BLD AUTO: 1.41 10*3/MM3 (ref 0.1–0.9)
MONOCYTES NFR BLD AUTO: 9.4 % (ref 5–12)
NEUTROPHILS NFR BLD AUTO: 12.38 10*3/MM3 (ref 1.7–7)
NEUTROPHILS NFR BLD AUTO: 82.6 % (ref 42.7–76)
NRBC BLD AUTO-RTO: 0 /100 WBC (ref 0–0.2)
PLATELET # BLD AUTO: 335 10*3/MM3 (ref 140–450)
PMV BLD AUTO: 9.8 FL (ref 6–12)
POTASSIUM SERPL-SCNC: 4.2 MMOL/L (ref 3.5–5.2)
QT INTERVAL: 468 MS
QTC INTERVAL: 490 MS
RBC # BLD AUTO: 3.53 10*6/MM3 (ref 4.14–5.8)
SODIUM SERPL-SCNC: 139 MMOL/L (ref 136–145)
SODIUM UR-SCNC: <20 MMOL/L
VANCOMYCIN SERPL-MCNC: 13.8 MCG/ML (ref 5–40)
WBC NRBC COR # BLD AUTO: 15 10*3/MM3 (ref 3.4–10.8)

## 2024-04-28 PROCEDURE — 25010000002 VANCOMYCIN 750 MG RECONSTITUTED SOLUTION 1 EACH VIAL

## 2024-04-28 PROCEDURE — 25010000002 PIPERACILLIN SOD-TAZOBACTAM PER 1 G: Performed by: FAMILY MEDICINE

## 2024-04-28 PROCEDURE — 25010000002 ALBUMIN HUMAN 25% PER 50 ML: Performed by: INTERNAL MEDICINE

## 2024-04-28 PROCEDURE — 82570 ASSAY OF URINE CREATININE: CPT | Performed by: INTERNAL MEDICINE

## 2024-04-28 PROCEDURE — 97530 THERAPEUTIC ACTIVITIES: CPT

## 2024-04-28 PROCEDURE — 80048 BASIC METABOLIC PNL TOTAL CA: CPT | Performed by: FAMILY MEDICINE

## 2024-04-28 PROCEDURE — 82948 REAGENT STRIP/BLOOD GLUCOSE: CPT

## 2024-04-28 PROCEDURE — 84300 ASSAY OF URINE SODIUM: CPT | Performed by: INTERNAL MEDICINE

## 2024-04-28 PROCEDURE — 25810000003 SODIUM CHLORIDE 0.9 % SOLUTION 250 ML FLEX CONT

## 2024-04-28 PROCEDURE — 80202 ASSAY OF VANCOMYCIN: CPT

## 2024-04-28 PROCEDURE — 97162 PT EVAL MOD COMPLEX 30 MIN: CPT

## 2024-04-28 PROCEDURE — 63710000001 INSULIN LISPRO (HUMAN) PER 5 UNITS: Performed by: FAMILY MEDICINE

## 2024-04-28 PROCEDURE — P9047 ALBUMIN (HUMAN), 25%, 50ML: HCPCS | Performed by: INTERNAL MEDICINE

## 2024-04-28 PROCEDURE — 85025 COMPLETE CBC W/AUTO DIFF WBC: CPT | Performed by: FAMILY MEDICINE

## 2024-04-28 PROCEDURE — 83735 ASSAY OF MAGNESIUM: CPT | Performed by: FAMILY MEDICINE

## 2024-04-28 PROCEDURE — 99223 1ST HOSP IP/OBS HIGH 75: CPT | Performed by: NURSE PRACTITIONER

## 2024-04-28 RX ORDER — ACETAMINOPHEN 500 MG
500 TABLET ORAL EVERY 6 HOURS PRN
Status: DISCONTINUED | OUTPATIENT
Start: 2024-04-28 | End: 2024-05-01 | Stop reason: HOSPADM

## 2024-04-28 RX ORDER — MIDODRINE HYDROCHLORIDE 5 MG/1
5 TABLET ORAL
Status: DISCONTINUED | OUTPATIENT
Start: 2024-04-28 | End: 2024-05-01 | Stop reason: HOSPADM

## 2024-04-28 RX ORDER — ALBUMIN (HUMAN) 12.5 G/50ML
50 SOLUTION INTRAVENOUS 2 TIMES DAILY
Status: DISCONTINUED | OUTPATIENT
Start: 2024-04-28 | End: 2024-04-29

## 2024-04-28 RX ORDER — LACTULOSE 10 G/15ML
10 SOLUTION ORAL DAILY
Status: DISCONTINUED | OUTPATIENT
Start: 2024-04-28 | End: 2024-05-01 | Stop reason: HOSPADM

## 2024-04-28 RX ADMIN — ALBUMIN (HUMAN) 50 G: 0.25 INJECTION, SOLUTION INTRAVENOUS at 21:34

## 2024-04-28 RX ADMIN — LEVOTHYROXINE SODIUM 137 MCG: 137 TABLET ORAL at 06:34

## 2024-04-28 RX ADMIN — INSULIN LISPRO 2 UNITS: 100 INJECTION, SOLUTION INTRAVENOUS; SUBCUTANEOUS at 12:27

## 2024-04-28 RX ADMIN — VANCOMYCIN HYDROCHLORIDE 750 MG: 750 INJECTION, POWDER, LYOPHILIZED, FOR SOLUTION INTRAVENOUS at 16:56

## 2024-04-28 RX ADMIN — MIDODRINE HYDROCHLORIDE 5 MG: 5 TABLET ORAL at 16:56

## 2024-04-28 RX ADMIN — MEMANTINE 5 MG: 10 TABLET ORAL at 21:34

## 2024-04-28 RX ADMIN — ATORVASTATIN CALCIUM 80 MG: 40 TABLET, FILM COATED ORAL at 08:46

## 2024-04-28 RX ADMIN — MEMANTINE 5 MG: 10 TABLET ORAL at 08:45

## 2024-04-28 RX ADMIN — PANTOPRAZOLE SODIUM 40 MG: 40 TABLET, DELAYED RELEASE ORAL at 06:34

## 2024-04-28 RX ADMIN — LACTULOSE 10 G: 20 SOLUTION ORAL at 14:21

## 2024-04-28 RX ADMIN — PIPERACILLIN AND TAZOBACTAM 3.38 G: 3; .375 INJECTION, POWDER, LYOPHILIZED, FOR SOLUTION INTRAVENOUS at 22:43

## 2024-04-28 RX ADMIN — ACETAMINOPHEN 500 MG: 500 TABLET ORAL at 15:53

## 2024-04-28 RX ADMIN — SENNOSIDES AND DOCUSATE SODIUM 2 TABLET: 8.6; 5 TABLET ORAL at 08:46

## 2024-04-28 RX ADMIN — RIFAXIMIN 550 MG: 550 TABLET ORAL at 14:21

## 2024-04-28 RX ADMIN — APIXABAN 2.5 MG: 2.5 TABLET, FILM COATED ORAL at 21:34

## 2024-04-28 RX ADMIN — PIPERACILLIN AND TAZOBACTAM 3.38 G: 3; .375 INJECTION, POWDER, LYOPHILIZED, FOR SOLUTION INTRAVENOUS at 05:15

## 2024-04-28 RX ADMIN — Medication 1 TABLET: at 08:45

## 2024-04-28 RX ADMIN — AMLODIPINE BESYLATE 5 MG: 5 TABLET ORAL at 08:45

## 2024-04-28 RX ADMIN — RIFAXIMIN 550 MG: 550 TABLET ORAL at 22:42

## 2024-04-28 RX ADMIN — APIXABAN 2.5 MG: 2.5 TABLET, FILM COATED ORAL at 08:45

## 2024-04-28 RX ADMIN — INSULIN LISPRO 2 UNITS: 100 INJECTION, SOLUTION INTRAVENOUS; SUBCUTANEOUS at 16:55

## 2024-04-28 RX ADMIN — INSULIN LISPRO 2 UNITS: 100 INJECTION, SOLUTION INTRAVENOUS; SUBCUTANEOUS at 08:46

## 2024-04-28 RX ADMIN — Medication 10 ML: at 08:46

## 2024-04-28 RX ADMIN — PIPERACILLIN AND TAZOBACTAM 3.38 G: 3; .375 INJECTION, POWDER, LYOPHILIZED, FOR SOLUTION INTRAVENOUS at 14:21

## 2024-04-28 RX ADMIN — SENNOSIDES AND DOCUSATE SODIUM 2 TABLET: 8.6; 5 TABLET ORAL at 21:34

## 2024-04-28 RX ADMIN — ASPIRIN 81 MG: 81 TABLET, COATED ORAL at 08:45

## 2024-04-28 NOTE — CONSULTS
[facility]  Inpatient Gastroenterology Consult    Inpatient Gastroenterology Consult  Consult performed by: Guilherme Ta APRN  Consult ordered by: Rajwinder Freeman MD  Reason for consult: Courtesy consult to GI; needs outpatient standing order for paracentesis prior to d/c    Referring Provider: No ref. provider found    PCP: Ilir Fair MD    Chief Complaint: confusion     History of present illness:    Onel Clark is a 89 y.o. male who is admitted with worsening confusion.  GI consult received as patient is established with our practice for which she is followed for liver cirrhosis with ascites.  At time of exam, patient is oriented only to self; when asked what year it is, patient responds 1960, when asked where he is currently at he responds a motel, and is unaware of current situation.  No family available at bedside.    Chart review finds that patient was brought to the emergency department for worsening confusion and restlessness.  Patient does not endorse any issues with N/V/D, abdominal pain, SOB, CP, or F/C.  As noted above, does have history of decompensated liver cirrhosis with ascites and has required recurrent paracentesis on the outpatient basis.  CT imaging obtained last evening shows evidence of reaccumulated ascites 10 days post most recent paracentesis.  Sodium is within normal limits and indicative of failure to adhere to dietary restrictions i.e. low-sodium diet. Past medical, surgical, social, and family histories are reviewed for accuracy.  No documented alleviating or exacerbating factors.  Does not endorse pain at time of exam.    Allergies:  Patient has no known allergies.    Scheduled Meds:  amLODIPine, 5 mg, Oral, Daily  apixaban, 2.5 mg, Oral, BID  aspirin, 81 mg, Oral, Daily  atorvastatin, 80 mg, Oral, Daily  insulin lispro, 2-7 Units, Subcutaneous, 4x Daily AC & at Bedtime  levothyroxine, 137 mcg, Oral, QAM  memantine, 5 mg, Oral,  BID  multivitamin with minerals, 1 tablet, Oral, Daily  pantoprazole, 40 mg, Oral, Q AM  piperacillin-tazobactam, 3.375 g, Intravenous, Q8H  senna-docusate sodium, 2 tablet, Oral, BID  sodium chloride, 10 mL, Intravenous, Q12H  vancomycin (dosing per levels), , Does not apply, Daily    Infusions:  Pharmacy to dose vancomycin,     PRN Meds:    senna-docusate sodium **AND** polyethylene glycol **AND** bisacodyl **AND** bisacodyl    Calcium Replacement - Follow Nurse / BPA Driven Protocol    dextrose    dextrose    glucagon (human recombinant)    haloperidol lactate    Magnesium Standard Dose Replacement - Follow Nurse / BPA Driven Protocol    Morphine **AND** naloxone    nitroglycerin    ondansetron    Pharmacy to dose vancomycin    Phosphorus Replacement - Follow Nurse / BPA Driven Protocol    Potassium Replacement - Follow Nurse / BPA Driven Protocol    QUEtiapine    sodium chloride    sodium chloride    sodium chloride    Home Meds:  Medications Prior to Admission   Medication Sig Dispense Refill Last Dose    amLODIPine (NORVASC) 5 MG tablet TAKE 1 TABLET BY MOUTH EVERY DAY 90 tablet 0 4/26/2024    apixaban (ELIQUIS) 2.5 MG tablet tablet Take 1 tablet by mouth Daily. (Patient taking differently: Take 1 tablet by mouth Daily. BID) 180 tablet 3 4/26/2024    ASPIRIN 81 PO Take 81 mg by mouth Daily.   4/26/2024    atorvastatin (LIPITOR) 80 MG tablet TAKE 1 TABLET BY MOUTH AT BEDTIME 90 tablet 1 4/26/2024    bumetanide (BUMEX) 2 MG tablet Take 1 tablet by mouth Daily. 90 tablet 3 4/26/2024    CALCIUM CARB-CHOLECALCIFEROL PO Take  by mouth.   4/26/2024    Cetirizine HCl 10 MG capsule Take 10 mg by mouth Daily With Breakfast. Indications: Hayfever   4/26/2024    levothyroxine (SYNTHROID, LEVOTHROID) 137 MCG tablet TAKE 1 TABLET BY MOUTH EVERY MORNING 90 tablet 0 4/26/2024    losartan (COZAAR) 50 MG tablet TAKE 1 TABLET BY MOUTH EVERY DAY 90 tablet 0 4/26/2024    Magnesium 400 MG capsule Take 400 mg by mouth Daily.    4/26/2024    meloxicam (MOBIC) 7.5 MG tablet    4/26/2024    memantine (NAMENDA) 5 MG tablet TAKE 1 TABLET BY MOUTH 2 TIMES A  tablet 0 4/26/2024    metFORMIN (GLUCOPHAGE) 1000 MG tablet Take 1 tablet by mouth 2 (Two) Times a Day With Meals. 180 tablet 1 4/26/2024    multivitamin with minerals (MENS MULTI VITAMIN & MINERAL PO) Take 1 tablet by mouth Daily.   4/26/2024    omeprazole (priLOSEC) 40 MG capsule TAKE 1 CAPSULE BY MOUTH EVERY DAY 90 capsule 0 4/26/2024    vitamin C (ASCORBIC ACID) 250 MG tablet Take 1 tablet by mouth Daily.   4/26/2024     ROS: Review of Systems   Unable to perform ROS: Other     PAST MED HX:  Past Medical History:   Diagnosis Date    Acquired hypothyroidism     Allergic     Allergies     Arthritis     Benign prostatic hyperplasia with nocturia     Bilateral carotid artery disease     Cancer of the skin, basal cell     Cataract     CHF (congestive heart failure)     Chronic non-seasonal allergic rhinitis     DUE TO POLLEN    Chronic renal impairment     Cirrhosis of liver 04/27/2024    CKD (chronic kidney disease) stage 3, GFR 30-59 ml/min 04/27/2024    COPD (chronic obstructive pulmonary disease)     Coronary artery disease     Decreased hearing of both ears     Degenerative lumbar spinal stenosis     Diabetes mellitus     Elevated PSA     Frequent falls     GERD without esophagitis     Heart murmur     High risk medication use     HL (hearing loss)     Hx of bilateral hip replacements     Hx of decompressive lumbar laminectomy     Hx of total knee replacement, bilateral     Hx of transient ischemic attack (TIA)     Hypertension     Mixed hyperlipidemia due to type 2 diabetes mellitus     Nicotine dependence     No natural teeth     FALSE TEETH    Osteoarthritis     Primary hypertension     Primary osteoarthritis involving multiple joints     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 2 diabetes mellitus     PVD (peripheral vascular disease) 04/27/2024    Stroke TIA  2 years ago    Thyroid disease   "   TIA (transient ischemic attack)     Type 2 diabetes mellitus with diabetic polyneuropathy     Type 2 diabetes mellitus with stage 2 chronic kidney disease, without long-term current use of insulin        PAST SURG HX:  Past Surgical History:   Procedure Laterality Date    BACK SURGERY      Lumbar Laminectomy    CARPAL TUNNEL RELEASE      COLONOSCOPY      EYE SURGERY  cataracts    JOINT REPLACEMENT  both hips & knee & back    REPLACEMENT TOTAL KNEE Left 2013    SPINE SURGERY  about 15 yrs ago    TOTAL HIP ARTHROPLASTY Right 2016       FAM HX:  Family History   Problem Relation Age of Onset    Alzheimer's disease Mother     Diabetes Mother     Hyperlipidemia Mother     Heart attack Father     Stroke Father     Coronary artery disease Father     Hearing loss Father     Hyperlipidemia Father     Diabetes Sister     Hypertension Sister     Hyperlipidemia Sister     Arthritis Sister     Arthritis Brother     Diabetes Brother     Hearing loss Brother        SOC HX:  Social History     Socioeconomic History    Marital status:     Number of children: 1   Tobacco Use    Smoking status: Former     Current packs/day: 0.00     Average packs/day: 1 pack/day for 10.0 years (10.0 ttl pk-yrs)     Types: Cigarettes     Start date:      Quit date: 2000     Years since quittin.3     Passive exposure: Past    Smokeless tobacco: Never    Tobacco comments:     None   Vaping Use    Vaping status: Never Used   Substance and Sexual Activity    Alcohol use: Yes     Alcohol/week: 1.0 standard drink of alcohol     Types: 1 Glasses of wine per week     Comment: None    Drug use: No    Sexual activity: Not Currently     Partners: Male     Comment: With  (male) until lately - old age problems       PHYSICAL EXAM  /62 (BP Location: Right arm, Patient Position: Lying)   Pulse 66   Temp 97.8 °F (36.6 °C) (Oral)   Resp 18   Ht 182.9 cm (72\")   Wt 90.3 kg (199 lb)   SpO2 96%   BMI 26.99 kg/m²   Wt Readings " from Last 3 Encounters:   04/27/24 90.3 kg (199 lb)   04/18/24 90.3 kg (199 lb)   03/21/24 88.5 kg (195 lb)   ,body mass index is 26.99 kg/m².  Physical Exam  Vitals and nursing note reviewed.   Constitutional:       General: He is not in acute distress.     Appearance: He is ill-appearing. He is not toxic-appearing.      Comments: Patient is an elderly, frail-appearing male with marked cachexia and temporal muscle wasting.   HENT:      Head: Normocephalic and atraumatic.   Eyes:      General: No scleral icterus.     Extraocular Movements: Extraocular movements intact.      Pupils: Pupils are equal, round, and reactive to light.   Cardiovascular:      Rate and Rhythm: Normal rate. Rhythm irregular.      Heart sounds: Murmur heard.   Pulmonary:      Effort: Pulmonary effort is normal.      Breath sounds: Normal breath sounds.   Abdominal:      General: Abdomen is flat. Bowel sounds are normal. There is no distension.      Palpations: Abdomen is soft. There is no mass.      Tenderness: There is no abdominal tenderness. There is no guarding or rebound.      Hernia: No hernia is present.   Genitourinary:     Comments: Defer  Musculoskeletal:         General: Normal range of motion.      Right lower leg: No edema.      Left lower leg: No edema.   Skin:     General: Skin is warm and dry.      Capillary Refill: Capillary refill takes less than 2 seconds.      Coloration: Skin is pale. Skin is not jaundiced.   Neurological:      Mental Status: He is alert.      Comments: Alert to person but disoriented otherwise; tremor of right upper extremity noted on exam, no asterixis on left   Psychiatric:      Comments: Pleasantly confused   Results Review:   I reviewed the patient's new clinical results.  I reviewed the patient's new imaging results and agree with the interpretation.  I reviewed the patient's other test results and agree with the interpretation  I personally viewed and interpreted the patient's EKG/Telemetry  data    Lab Results   Component Value Date    WBC 15.00 (H) 04/28/2024    HGB 10.2 (L) 04/28/2024    HGB 10.5 (L) 04/27/2024    HGB 11.6 (L) 02/29/2024    HCT 30.9 (L) 04/28/2024    MCV 87.5 04/28/2024     04/28/2024       Lab Results   Component Value Date    INR 1.38 (H) 04/18/2024    INR 1.12 01/22/2024    INR 1.08 01/22/2024       Lab Results   Component Value Date    GLUCOSE 165 (H) 04/28/2024    BUN 49 (H) 04/28/2024    CREATININE 2.18 (H) 04/28/2024    BCR 22.5 04/28/2024     04/28/2024    K 4.2 04/28/2024    CO2 24.0 04/28/2024    CALCIUM 8.6 04/28/2024    PROTENTOTREF 6.5 02/29/2024    ALBUMIN 3.0 (L) 04/27/2024    ALKPHOS 222 (H) 04/27/2024    BILITOT 1.0 04/27/2024    ALT 9 04/27/2024    AST 18 04/27/2024       CT Chest Without Contrast Diagnostic    Result Date: 4/27/2024  CT HEAD WO CONTRAST, CT CHEST WO CONTRAST DIAGNOSTIC, CT ABDOMEN PELVIS WO CONTRAST Date of Exam: 4/27/2024 2:51 PM EDT Indication: AMS, confusion. Comparison: 12/4/2023 chest CT and 1/3/2024 PET/CT Technique: Axial CT images were obtained of the head, chest, abdomen, and pelvis without contrast administration.  Automated exposure control and iterative construction methods were used. Findings: CT HEAD: There is no evidence of acute intracranial hemorrhage, mass, midline shift, loss of gray-white matter differentiation, or extra-axial fluid collection. Subcortical periventricular white matter hypodensities are present consistent with small vessel ischemic disease. There is extensive global parenchymal volume loss with ex vacuo dilation of the ventricular system. The basal cisterns are patent. There is no fracture or suspicious osseous lesion. Mild mucosal thickening of the bilateral maxillary sinuses. Partial mastoid air cell effusions. Bilateral ocular surgery. Soft tissues are unremarkable. CT CHEST: The central airways are patent. There are hypoventilatory changes of the lung bases, left greater than right, with  associated left lower lobe rounded atelectasis. This is unchanged from recent PET/CT. There is no new focal airspace consolidation. No new suspicious pulmonary nodule or mass. Unchanged small left pleural effusion, likely chronic given associated pleural wall thickening. Unchanged cardiomegaly. Dense atherosclerotic calcifications of the coronary arteries and aortic valve. There are also dense thoracic aortic calcifications. Trace pericardial effusion. No mediastinal mass or threshold lymphadenopathy. Chest wall soft tissues show no acute abnormality. Gynecomastia. No evidence of chest wall fracture or suspicious osseous lesion. CT ABDOMEN/PELVIS: Cirrhotic morphology of the liver. No suspicious hepatic lesion. The gallbladder is unremarkable. No significant biliary ductal dilation. The spleen, pancreas, and bilateral adrenal glands show no acute abnormality. There is a small right superior pole renal cyst, unchanged. Bilateral renal parenchymal scarring/thinning. No hydronephrosis or nephrolithiasis. No suspicious renal lesion. Small hiatal hernia. Thickening of the gastric wall which may be secondary to under distention. There is no evidence of bowel obstruction. Colonic diverticulosis without definite evidence of diverticulitis. Appendix is not well visualized and may be surgically absent or decompressed. There is no evidence of suspicious lymphadenopathy. There are shotty retroperitoneal and mesenteric lymph nodes which are unchanged from January 2024 PET/CT. There is moderate abdominopelvic ascites with autumn mesentery. No evidence of abdominal aortic aneurysm. Dense atherosclerotic calcifications of the aorta and branch vessels. The urinary bladder and prostate is difficult to evaluate given adjacent metallic artifact, however is grossly unremarkable. The abdominal and pelvic wall soft tissues show no acute abnormality. There is mild diffuse fatty muscle atrophy without discrete fluid collection. Bilateral hip  arthroplasties. Cerclage fixation of the right intertrochanteric region. Chronic compression deformity of L1 vertebral body. No evidence of acute fracture or suspicious bony lesion.     Impression: CT HEAD: No acute intracranial abnormality. Chronic sequela of probable small vessel ischemic disease and severe global parenchymal volume loss. CT CHEST: No acute abnormality in the chest. Bilateral hypoventilatory changes with round atelectasis in the left lung base. Chronic adjacent small left pleural effusion with pleural wall thickening, not significantly changed from December 2023 chest CT.  Chronic ancillary findings as above. CT ABDOMEN/PELVIS: No acute abnormality in the abdomen or pelvis. Cirrhosis with moderate abdominopelvic ascites. Chronic ancillary findings as above. Electronically Signed: Kenroy Mcneil MD  4/27/2024 3:30 PM EDT  Workstation ID: [id number]    CT Abdomen Pelvis Without Contrast    Result Date: 4/27/2024  CT HEAD WO CONTRAST, CT CHEST WO CONTRAST DIAGNOSTIC, CT ABDOMEN PELVIS WO CONTRAST Date of Exam: 4/27/2024 2:51 PM EDT Indication: AMS, confusion. Comparison: 12/4/2023 chest CT and 1/3/2024 PET/CT Technique: Axial CT images were obtained of the head, chest, abdomen, and pelvis without contrast administration.  Automated exposure control and iterative construction methods were used. Findings: CT HEAD: There is no evidence of acute intracranial hemorrhage, mass, midline shift, loss of gray-white matter differentiation, or extra-axial fluid collection. Subcortical periventricular white matter hypodensities are present consistent with small vessel ischemic disease. There is extensive global parenchymal volume loss with ex vacuo dilation of the ventricular system. The basal cisterns are patent. There is no fracture or suspicious osseous lesion. Mild mucosal thickening of the bilateral maxillary sinuses. Partial mastoid air cell effusions. Bilateral ocular surgery. Soft tissues are unremarkable. CT  CHEST: The central airways are patent. There are hypoventilatory changes of the lung bases, left greater than right, with associated left lower lobe rounded atelectasis. This is unchanged from recent PET/CT. There is no new focal airspace consolidation. No new suspicious pulmonary nodule or mass. Unchanged small left pleural effusion, likely chronic given associated pleural wall thickening. Unchanged cardiomegaly. Dense atherosclerotic calcifications of the coronary arteries and aortic valve. There are also dense thoracic aortic calcifications. Trace pericardial effusion. No mediastinal mass or threshold lymphadenopathy. Chest wall soft tissues show no acute abnormality. Gynecomastia. No evidence of chest wall fracture or suspicious osseous lesion. CT ABDOMEN/PELVIS: Cirrhotic morphology of the liver. No suspicious hepatic lesion. The gallbladder is unremarkable. No significant biliary ductal dilation. The spleen, pancreas, and bilateral adrenal glands show no acute abnormality. There is a small right superior pole renal cyst, unchanged. Bilateral renal parenchymal scarring/thinning. No hydronephrosis or nephrolithiasis. No suspicious renal lesion. Small hiatal hernia. Thickening of the gastric wall which may be secondary to under distention. There is no evidence of bowel obstruction. Colonic diverticulosis without definite evidence of diverticulitis. Appendix is not well visualized and may be surgically absent or decompressed. There is no evidence of suspicious lymphadenopathy. There are shotty retroperitoneal and mesenteric lymph nodes which are unchanged from January 2024 PET/CT. There is moderate abdominopelvic ascites with autumn mesentery. No evidence of abdominal aortic aneurysm. Dense atherosclerotic calcifications of the aorta and branch vessels. The urinary bladder and prostate is difficult to evaluate given adjacent metallic artifact, however is grossly unremarkable. The abdominal and pelvic wall soft  tissues show no acute abnormality. There is mild diffuse fatty muscle atrophy without discrete fluid collection. Bilateral hip arthroplasties. Cerclage fixation of the right intertrochanteric region. Chronic compression deformity of L1 vertebral body. No evidence of acute fracture or suspicious bony lesion.     Impression: CT HEAD: No acute intracranial abnormality. Chronic sequela of probable small vessel ischemic disease and severe global parenchymal volume loss. CT CHEST: No acute abnormality in the chest. Bilateral hypoventilatory changes with round atelectasis in the left lung base. Chronic adjacent small left pleural effusion with pleural wall thickening, not significantly changed from December 2023 chest CT.  Chronic ancillary findings as above. CT ABDOMEN/PELVIS: No acute abnormality in the abdomen or pelvis. Cirrhosis with moderate abdominopelvic ascites. Chronic ancillary findings as above. Electronically Signed: Kenroy Mcneil MD  4/27/2024 3:30 PM EDT  Workstation ID: [id number]    CT Head Without Contrast    Result Date: 4/27/2024  CT HEAD WO CONTRAST, CT CHEST WO CONTRAST DIAGNOSTIC, CT ABDOMEN PELVIS WO CONTRAST Date of Exam: 4/27/2024 2:51 PM EDT Indication: AMS, confusion. Comparison: 12/4/2023 chest CT and 1/3/2024 PET/CT Technique: Axial CT images were obtained of the head, chest, abdomen, and pelvis without contrast administration.  Automated exposure control and iterative construction methods were used. Findings: CT HEAD: There is no evidence of acute intracranial hemorrhage, mass, midline shift, loss of gray-white matter differentiation, or extra-axial fluid collection. Subcortical periventricular white matter hypodensities are present consistent with small vessel ischemic disease. There is extensive global parenchymal volume loss with ex vacuo dilation of the ventricular system. The basal cisterns are patent. There is no fracture or suspicious osseous lesion. Mild mucosal thickening of the  bilateral maxillary sinuses. Partial mastoid air cell effusions. Bilateral ocular surgery. Soft tissues are unremarkable. CT CHEST: The central airways are patent. There are hypoventilatory changes of the lung bases, left greater than right, with associated left lower lobe rounded atelectasis. This is unchanged from recent PET/CT. There is no new focal airspace consolidation. No new suspicious pulmonary nodule or mass. Unchanged small left pleural effusion, likely chronic given associated pleural wall thickening. Unchanged cardiomegaly. Dense atherosclerotic calcifications of the coronary arteries and aortic valve. There are also dense thoracic aortic calcifications. Trace pericardial effusion. No mediastinal mass or threshold lymphadenopathy. Chest wall soft tissues show no acute abnormality. Gynecomastia. No evidence of chest wall fracture or suspicious osseous lesion. CT ABDOMEN/PELVIS: Cirrhotic morphology of the liver. No suspicious hepatic lesion. The gallbladder is unremarkable. No significant biliary ductal dilation. The spleen, pancreas, and bilateral adrenal glands show no acute abnormality. There is a small right superior pole renal cyst, unchanged. Bilateral renal parenchymal scarring/thinning. No hydronephrosis or nephrolithiasis. No suspicious renal lesion. Small hiatal hernia. Thickening of the gastric wall which may be secondary to under distention. There is no evidence of bowel obstruction. Colonic diverticulosis without definite evidence of diverticulitis. Appendix is not well visualized and may be surgically absent or decompressed. There is no evidence of suspicious lymphadenopathy. There are shotty retroperitoneal and mesenteric lymph nodes which are unchanged from January 2024 PET/CT. There is moderate abdominopelvic ascites with autumn mesentery. No evidence of abdominal aortic aneurysm. Dense atherosclerotic calcifications of the aorta and branch vessels. The urinary bladder and prostate is  difficult to evaluate given adjacent metallic artifact, however is grossly unremarkable. The abdominal and pelvic wall soft tissues show no acute abnormality. There is mild diffuse fatty muscle atrophy without discrete fluid collection. Bilateral hip arthroplasties. Cerclage fixation of the right intertrochanteric region. Chronic compression deformity of L1 vertebral body. No evidence of acute fracture or suspicious bony lesion.     Impression: CT HEAD: No acute intracranial abnormality. Chronic sequela of probable small vessel ischemic disease and severe global parenchymal volume loss. CT CHEST: No acute abnormality in the chest. Bilateral hypoventilatory changes with round atelectasis in the left lung base. Chronic adjacent small left pleural effusion with pleural wall thickening, not significantly changed from December 2023 chest CT.  Chronic ancillary findings as above. CT ABDOMEN/PELVIS: No acute abnormality in the abdomen or pelvis. Cirrhosis with moderate abdominopelvic ascites. Chronic ancillary findings as above. Electronically Signed: Kenroy Mcneil MD  4/27/2024 3:30 PM EDT  Workstation ID: [id number]    XR Chest 1 View    Result Date: 4/27/2024  XR CHEST 1 VW Date of Exam: 4/27/2024 12:50 PM EDT Indication: Weak/Dizzy/AMS triage protocol Comparison: PET/CT January 3, 2024, chest CT December 4, 2023, chest radiographs November 15, 2023. Findings: Left mid and lower lung pleural and parenchymal opacities appear similar to prior exams. There is a suspected small left pleural effusion component. No right lung infiltrate or pleural effusion. No significant pneumothorax component is identified. Heart size appears within normal limits. There is atherosclerosis of the aorta.     Impression: 1.Left mid and lower lung pleural and parenchymal opacities appear grossly similar to recent prior studies. 2.No definite radiographic findings of acute cardiopulmonary abnormality. Electronically Signed: Samir Sanchez  4/27/2024  1:24 PM EDT  Workstation ID: [id number]    US Paracentesis    Result Date: 4/18/2024  US PARACENTESIS  Date of Exam: 4/18/2024 9:46 AM EDT  Indication: cirrhosis with ascites.  Comparison: None available.  Technique: A thorough discussion of the procedure, including the risks, benefits, and alternatives of the procedure was carried out with the patient. An informed written consent was obtained. A preprocedure timeout was performed. The interventional radiology nurse monitored the patient at all times. The patient was placed in supine position and a limited ultrasound was performed on the abdomen. Large amount of ascites noted. The right lower quadrant was then marked and prepped and draped in the usual sterile fashion. The skin and subcutaneous tissue was anesthetized with 1% lidocaine. Next, a 5 Armenian centesis needle was advanced using ultrasound guidance into the peritoneal space. A total of 5 L of straw-colored fluid was removed. The needle was then removed, hemostasis obtained, and a sterile dressing applied. Patient tolerated the procedure well without immediate complication.  Albumin was infused intravenously if ordered by the referring doctor.  Findings: See above      Impression: Successful ultrasound-guided paracentesis using a right lower quadrant access site with recovery of 5 L of fluid as described above.  I, Franky Monk MD, have personally reviewed the image(s) and the prepared and signed interpretation by Juliette Goetz NP.  Based on my review, I agree with the findings.   Report dictated by: EMMETT Lou  I have personally reviewed this case and agree with the findings above: Electronically Signed: Franky Monk MD  4/18/2024 4:31 PM EDT  Workstation ID: [id number]     ASSESSMENTS/PLANS  1.  Decompensated liver cirrhosis with ascites  MELD-Na: 18 points  Child-Rodrigez: B  2.  Ascites, related to above  3.  Metabolic encephalopathy, suspected underlying hepatic encephalopathy  4.  Sarcopenia,  related to above  5.  CKD, creatinine elevated above baseline.  6.  Type 2 diabetes mellitus, diabetic left foot wound  7.  Atrial flutter, anticoagulated on Eliquis  8.  Cognitive impairment  9.  Aortic stenosis    Onel Clark is a 89 y.o. male presents to hospital with altered mental status.  GI consult received for treatment planning going forward in terms of patient's decompensated liver cirrhosis.      Patient has required a total of 3 paracentesis this year (January, March, and April) with most recent CT imaging showing reaccumulated ascites over the past 9 days.  Patient sodium is also within normal limits and I strongly suspect he is not following a sodium restricted diet such.  Additionally, renal function is impaired which limits diuretics for management of his ascites.    Overall, patient is markedly muscle wasted and physically debilitated with poor ECOG status.  Agree with plans for palliative care and possible hospice going forward given his overall medical state.  Patient is medically complex and high risk of further decline.    For management of ascites  Daily weights  When +10 pounds, contact GI for paracentesis  Standing order for paracentesis will be placed by gastroenterology office  Continue to address nutrition; desperately needs a high-protein diet  Diuresis as renal function allows  Will only consider placement of tenkhoff catheter if going home with hospice.   For general cirrhosis care  Nutrition  high-protein / low sodium (less than 2g/day) diet  Frequent high-protein snacks  High-protein bedtime snack  AFP and ultrasound imaging up-to-date  Discontinue meloxicam; may use Tylenol up to 2 g daily  Avoid hepatotoxins  For altered mental status  Suspect there is underlying component of hepatic encephalopathy at play  Lactulose 10 g p.o. daily  Xifaxan 550 mg p.o. twice daily    As noted above, overall long-term prognosis is poor given patient's worsening weakness and muscle wasting  and overall poor ECOG status.  Palliative care consult certainly appropriate as would be a hospice consult for ongoing goals of care and end-of-life care discussions.    I discussed the patient's findings and my recommendations with patient and consulting provider    EMMETT Hilliard  04/28/24  09:51 EDT

## 2024-04-28 NOTE — PLAN OF CARE
Goal Outcome Evaluation:  Plan of Care Reviewed With: patient        Progress: no change  Outcome Evaluation: Pt is alert only to self and somewhat place with VSS, accelerated junctional rhythm on the monitor, and on RA. LR was infusing at 100 mL/hr until 0500 per orders. Pt slept well during the night. There are no complaints at this time.

## 2024-04-28 NOTE — PROGRESS NOTES
Pharmacy Consult-Vancomycin Dosing  Onel Clark is a  89 y.o. male receiving vancomycin therapy.     Indication:SSTI  Consulting Provider: hospitalist   ID Consult: No    Goal Trough: 10-15    Current Antimicrobial Therapy  Anti-Infectives (From admission, onward)      Ordered     Dose/Rate Route Frequency Start Stop    04/28/24 1337  vancomycin 750 mg in sodium chloride 0.9 % 250 mL IVPB-VTB        Ordering Provider: Naheed Bose RPH    750 mg  333.3 mL/hr over 45 Minutes Intravenous Once 04/28/24 1700      04/28/24 1223  riFAXIMin (XIFAXAN) tablet 550 mg        Ordering Provider: Guilherme Ta APRN    550 mg Oral Every 12 Hours Scheduled 04/28/24 1315 05/28/24 0859    04/27/24 2210  vancomycin (dosing per levels)        Ordering Provider: Jose R Garcia, PharmD     Does not apply Daily 04/28/24 0900 05/05/24 0859    04/27/24 2204  piperacillin-tazobactam (ZOSYN) 3.375 g IVPB in 100 mL NS MBP (CD)        Ordering Provider: Rajwinder Freeman MD    3.375 g  over 4 Hours Intravenous Every 8 Hours Scheduled 04/27/24 2215 04/30/24 2159    04/27/24 2204  Pharmacy to dose vancomycin        Ordering Provider: Rajwinder Freeman MD     Does not apply Continuous PRN 04/27/24 2204 04/30/24 2203    04/27/24 1350  vancomycin IVPB 2000 mg in 0.9% Sodium Chloride 500 mL        Ordering Provider: Juan Arciniega DO    22 mg/kg × 90.3 kg  250 mL/hr over 120 Minutes Intravenous Once 04/27/24 1430 04/27/24 1829    04/27/24 1350  piperacillin-tazobactam (ZOSYN) 3.375 g IVPB in 100 mL NS MBP (CD)        Ordering Provider: Juan Arciniega DO    3.375 g  over 30 Minutes Intravenous Once 04/27/24 1400 04/27/24 1627            Allergies  Allergies as of 04/27/2024    (No Known Allergies)       Labs    Results from last 7 days   Lab Units 04/28/24  0440 04/27/24  1755 04/27/24  1257   BUN mg/dL 49* 51* 51*   CREATININE mg/dL 2.18* 2.09* 2.25*       Results from last 7 days   Lab Units 04/28/24  0906 04/27/24  1257   WBC 10*3/mm3 15.00*  "15.76*       Evaluation of Dosing     Last Dose Received in the ED/Outside Facility: 2000mg  Is Patient on Dialysis or Renal Replacement: no    Ht - 182.9 cm (72\")  Wt - 90.3 kg (199 lb)    Estimated Creatinine Clearance: 28.2 mL/min (A) (by C-G formula based on SCr of 2.18 mg/dL (H)).    Intake & Output (last 3 days)         04/25 0701 04/26 0700 04/26 0701 04/27 0700 04/27 0701  04/28 0700    P.O.   118    IV Piggyback   100    Total Intake(mL/kg)   218 (2.4)    Net   +218                   Microbiology and Radiology  Microbiology Results (last 10 days)       Procedure Component Value - Date/Time    MRSA Screen, PCR (Inpatient) - Swab, Nares [[id number]]  (Normal) Collected: 04/27/24 1401    Lab Status: Final result Specimen: Swab from Nares Updated: 04/27/24 1522     MRSA PCR Negative    Narrative:      The negative predictive value of this diagnostic test is high and should only be used to consider de-escalating anti-MRSA therapy. A positive result may indicate colonization with MRSA and must be correlated clinically.  MRSA Negative            Vancomycin Levels:    Results from last 7 days   Lab Units 04/28/24  0440   VANCOMYCIN RM mcg/mL 13.80                     Assessment/Plan:    Pharmacy consulted to dose vancomycin for SSTI.  Patient was loaded on vancomycin 2000mg IV x 1 (~20mg/kg). Random level today is 13.8 mg/L (~12h post load).  Due to renal function will repeat dose this afternoon of vancomycin 750mg IV x 1 (~8mg/kg) and recheck level in the morning.  Continue to monitor renal function, cultures and sensitivities and clinical status and adjust regimen as necessary. MRSA PCR negative; WBC 15.  Pharmacy will continue to follow.        Naheed Bose, PharmD  4/28/2024  13:41 EDT    "

## 2024-04-28 NOTE — PROGRESS NOTES
"Pharmacy Consult-Vancomycin Dosing  Onel Clark is a  89 y.o. male receiving vancomycin therapy.     Indication:SSTI  Consulting Provider: hospitalist   ID Consult:     Goal Trough: 10-15    Current Antimicrobial Therapy  Anti-Infectives (From admission, onward)      Ordered     Dose/Rate Route Frequency Start Stop    04/27/24 2204  piperacillin-tazobactam (ZOSYN) 3.375 g IVPB in 100 mL NS MBP (CD)        Ordering Provider: Rajwinder Freeman MD    3.375 g  over 4 Hours Intravenous Every 8 Hours Scheduled 04/27/24 2215 04/30/24 2159 04/27/24 2204  Pharmacy to dose vancomycin        Ordering Provider: Rajwinder Freeman MD     Does not apply Continuous PRN 04/27/24 2204 04/30/24 2203    04/27/24 1350  vancomycin IVPB 2000 mg in 0.9% Sodium Chloride 500 mL        Ordering Provider: Juan Arciniega DO    22 mg/kg × 90.3 kg  250 mL/hr over 120 Minutes Intravenous Once 04/27/24 1430 04/27/24 1829    04/27/24 1350  piperacillin-tazobactam (ZOSYN) 3.375 g IVPB in 100 mL NS MBP (CD)        Ordering Provider: Juan Arciniega DO    3.375 g  over 30 Minutes Intravenous Once 04/27/24 1400 04/27/24 1627            Allergies  Allergies as of 04/27/2024    (No Known Allergies)       Labs    Results from last 7 days   Lab Units 04/27/24  1755 04/27/24  1257   BUN mg/dL 51* 51*   CREATININE mg/dL 2.09* 2.25*       Results from last 7 days   Lab Units 04/27/24  1257   WBC 10*3/mm3 15.76*       Evaluation of Dosing     Last Dose Received in the ED/Outside Facility: 2000mg  Is Patient on Dialysis or Renal Replacement: no    Ht - 182.9 cm (72\")  Wt - 90.3 kg (199 lb)    Estimated Creatinine Clearance: 29.4 mL/min (A) (by C-G formula based on SCr of 2.09 mg/dL (H)).    Intake & Output (last 3 days)         04/25 0701 04/26 0700 04/26 0701 04/27 0700 04/27 0701 04/28 0700    P.O.   118    IV Piggyback   100    Total Intake(mL/kg)   218 (2.4)    Net   +218                   Microbiology and Radiology  Microbiology Results (last 10 days)       " Procedure Component Value - Date/Time    MRSA Screen, PCR (Inpatient) - Swab, Nares [[id number]]  (Normal) Collected: 04/27/24 1401    Lab Status: Final result Specimen: Swab from Nares Updated: 04/27/24 1522     MRSA PCR Negative    Narrative:      The negative predictive value of this diagnostic test is high and should only be used to consider de-escalating anti-MRSA therapy. A positive result may indicate colonization with MRSA and must be correlated clinically.  MRSA Negative            Vancomycin Levels:                        Assessment/Plan:  The patient will be started on a bolus of 20mg/kg for a dose of 2000mg . Given the patient's current renal status, will dose per random levels and obtain a random level with morning labs before ordering another dose.  Due to infection severity, will target a trough of 10-15.    Pharmacy will continue to follow the patient’s culture results and clinical progress daily.    Jose R Garcia, SofiD

## 2024-04-28 NOTE — THERAPY EVALUATION
Patient Name: Onel Clark  : 3/25/1933    MRN: [id number]                              Today's Date: 2024       Admit Date: 2024    Visit Dx:     ICD-10-CM ICD-9-CM   1. Confusion  R41.0 298.9   2. Wound of left lower extremity, initial encounter  S81.802A 894.0   3. Wound of right lower extremity, initial encounter  S81.801A 894.0   4. History of cirrhosis  Z87.19 V12.79   5. Elevated troponin  R79.89 790.6   6. SIRS (systemic inflammatory response syndrome)  R65.10 995.90   7. CHICO (acute kidney injury)  N17.9 584.9   8. Anemia, unspecified type  D64.9 285.9   9. Atrial flutter with controlled response  I48.92 427.32     Patient Active Problem List   Diagnosis    Right leg pain    Degenerative lumbar spinal stenosis    Hereditary and idiopathic peripheral neuropathy    History of lumbar laminectomy    Type 2 diabetes mellitus without complication, without long-term current use of insulin    Primary hypertension    Aortic stenosis, severe    Atrial flutter with controlled response    SOB (shortness of breath)    Metabolic encephalopathy    Chronic anticoagulation    PVD (peripheral vascular disease)    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 2 diabetes mellitus    Cirrhosis of liver    Hx-TIA (transient ischemic attack)    CHF (no ECHO on file currently)    Elevated serum creatinine    CKD (chronic kidney disease) stage 3, GFR 30-59 ml/min    Hypomagnesemia     Past Medical History:   Diagnosis Date    Acquired hypothyroidism     Allergic     Allergies     Arthritis     Benign prostatic hyperplasia with nocturia     Bilateral carotid artery disease     Cancer of the skin, basal cell     Cataract     CHF (congestive heart failure)     Chronic non-seasonal allergic rhinitis     DUE TO POLLEN    Chronic renal impairment     Cirrhosis of liver 2024    CKD (chronic kidney disease) stage 3, GFR 30-59 ml/min 2024    COPD (chronic obstructive pulmonary disease)     Coronary artery  disease     Decreased hearing of both ears     Degenerative lumbar spinal stenosis     Diabetes mellitus     Elevated PSA     Frequent falls     GERD without esophagitis     Heart murmur     High risk medication use     HL (hearing loss)     Hx of bilateral hip replacements     Hx of decompressive lumbar laminectomy     Hx of total knee replacement, bilateral     Hx of transient ischemic attack (TIA)     Hypertension     Mixed hyperlipidemia due to type 2 diabetes mellitus     Nicotine dependence     No natural teeth     FALSE TEETH    Osteoarthritis     Primary hypertension     Primary osteoarthritis involving multiple joints     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 2 diabetes mellitus     PVD (peripheral vascular disease) 04/27/2024    Stroke TIA  2 years ago    Thyroid disease     TIA (transient ischemic attack)     Type 2 diabetes mellitus with diabetic polyneuropathy     Type 2 diabetes mellitus with stage 2 chronic kidney disease, without long-term current use of insulin      Past Surgical History:   Procedure Laterality Date    BACK SURGERY  2007    Lumbar Laminectomy    CARPAL TUNNEL RELEASE  2012    COLONOSCOPY      EYE SURGERY  cataracts    JOINT REPLACEMENT  both hips & knee & back    REPLACEMENT TOTAL KNEE Left 2013    SPINE SURGERY  about 15 yrs ago    TOTAL HIP ARTHROPLASTY Right 2016      General Information       Row Name 04/28/24 1139          Physical Therapy Time and Intention    Document Type evaluation  -ND     Mode of Treatment physical therapy  -ND       Row Name 04/28/24 1139          General Information    Patient Profile Reviewed yes  -ND     Prior Level of Function min assist:;all household mobility;gait;transfer;bed mobility  Pt uses rollator within house with min-A from son for ambulation, transfers, and getting in and out of bed. Per family reports, pt requires increased assist for silvia-hygiene and bathing/dressing.  -ND     Existing Precautions/Restrictions fall;oxygen therapy device and L/min  -ND      Barriers to Rehab medically complex;previous functional deficit;cognitive status  -ND       Row Name 04/28/24 1139          Living Environment    People in Home child(claudio), adult;spouse  -ND       Row Name 04/28/24 1139          Home Main Entrance    Number of Stairs, Main Entrance other (see comments)  ramp  -ND       Row Name 04/28/24 1139          Stairs Within Home, Primary    Number of Stairs, Within Home, Primary none  -ND       Row Name 04/28/24 1139          Cognition    Orientation Status (Cognition) oriented to;person;time;verbal cues/prompts needed for orientation;place;situation  -ND       Row Name 04/28/24 1139          Safety Issues, Functional Mobility    Safety Issues Affecting Function (Mobility) awareness of need for assistance;friction/shear risk;insight into deficits/self-awareness;judgment;safety precaution awareness;safety precautions follow-through/compliance;sequencing abilities  -ND     Impairments Affecting Function (Mobility) balance;cognition;endurance/activity tolerance;coordination;strength;motor planning;postural/trunk control;pain  -ND     Cognitive Impairments, Mobility Safety/Performance insight into deficits/self-awareness;judgment;problem-solving/reasoning;safety precaution awareness;safety precaution follow-through;sequencing abilities  -ND               User Key  (r) = Recorded By, (t) = Taken By, (c) = Cosigned By      Initials Name Provider Type    ND Tatum Martin PT Physical Therapist                   Mobility       Row Name 04/28/24 1143          Bed Mobility    Bed Mobility supine-sit  -ND     Supine-Sit [place] (Bed Mobility) moderate assist (50% patient effort);1 person assist;verbal cues;nonverbal cues (demo/gesture)  -ND     Assistive Device (Bed Mobility) bed rails;head of bed elevated;draw sheet  -ND     Comment, (Bed Mobility) Assist BLE and trunk for supine>sit, limited by R shoulder pain. Assist to scoot hips toward EOB.  -ND       Row Name  04/28/24 1143          Bed-Chair Transfer    Bed-Chair [place] (Transfers) maximum assist (25% patient effort);1 person assist;verbal cues;nonverbal cues (demo/gesture)  -ND     Assistive Device (Bed-Chair Transfers) other (see comments)  BUE support  -ND     Comment, (Bed-Chair Transfer) SPT from EOB>chair.  -ND       Row Name 04/28/24 1143          Sit-Stand Transfer    Sit-Stand [place] (Transfers) maximum assist (25% patient effort);1 person assist;verbal cues;nonverbal cues (demo/gesture)  -ND     Assistive Device (Sit-Stand Transfers) other (see comments)  BUE support  -ND     Comment, (Sit-Stand Transfer) 2 attempts from EOB with pt unable to fully extend knees, bed height raised and pt able to stand with max-A.  -ND       Row Name 04/28/24 1143          Gait/Stairs (Locomotion)    [place] Level (Gait) unable to assess  -ND     Comment, (Gait/Stairs) Gait deferred due to poor standing tolerance.  -ND               User Key  (r) = Recorded By, (t) = Taken By, (c) = Cosigned By      Initials Name Provider Type    Tatum Barron, PT Physical Therapist                   Obj/Interventions       Row Name 04/28/24 1145          Range of Motion Comprehensive    General Range of Motion bilateral lower extremity ROM WFL  -ND       [facility] Name 04/28/24 1145          Strength Comprehensive (MMT)    General Manual Muscle Testing (MMT) Assessment lower extremity strength deficits identified  -ND     Comment, General Manual Muscle Testing (MMT) Assessment BLE MMT 4-/5.  -ND       Row Name 04/28/24 1145          Motor Skills    Motor Skills coordination  -ND     Coordination bilateral;heel to shin;moderate impairment  -ND       Row Name 04/28/24 1145          Balance    Balance Assessment sitting static balance;sitting dynamic balance;sit to stand dynamic balance;standing static balance;standing dynamic balance  -ND     Static Sitting Balance standby assist  -ND     Dynamic Sitting Balance minimal assist   -ND     Position, Sitting Balance unsupported;sitting edge of bed  -ND     Sit to Stand Dynamic Balance maximum assist;1-person assist;verbal cues;non-verbal cues (demo/gesture)  -ND     Static Standing Balance maximum assist;1-person assist;verbal cues;non-verbal cues (demo/gesture)  -ND     Dynamic Standing Balance maximum assist;1-person assist;verbal cues;non-verbal cues (demo/gesture)  -ND     Position/Device Used, Standing Balance supported;other (see comments)  BUE support  -ND     Balance Interventions sitting;standing;sit to stand;supported;static;dynamic  -ND     Comment, Balance Pt with significant instability with standing and transferring tasks.  -ND       Row Name 04/28/24 1145          Sensory Assessment (Somatosensory)    Sensory Assessment (Somatosensory) other (see comments)  Neuropathy in bilateral feet.  -ND               User Key  (r) = Recorded By, (t) = Taken By, (c) = Cosigned By      Initials Name Provider Type    ND Tatum Martin, PT Physical Therapist                   Goals/Plan       Row Name 04/28/24 1149          Bed Mobility Goal 1 (PT)    Activity/Assistive Device (Bed Mobility Goal 1, PT) sit to supine/supine to sit  -ND     [place] Level/Cues Needed (Bed Mobility Goal 1, PT) minimum assist (75% or more patient effort)  -ND     Time Frame (Bed Mobility Goal 1, PT) long term goal (LTG);10 days  -ND       Row Name 04/28/24 1148          Transfer Goal 1 (PT)    Activity/Assistive Device (Transfer Goal 1, PT) sit-to-stand/stand-to-sit;bed-to-chair/chair-to-bed;walker, rolling  -ND     [place] Level/Cues Needed (Transfer Goal 1, PT) minimum assist (75% or more patient effort)  -ND     Time Frame (Transfer Goal 1, PT) long term goal (LTG);10 days  -ND       Row Name 04/28/24 1142          Gait Training Goal 1 (PT)    Activity/Assistive Device (Gait Training Goal 1, PT) gait (walking locomotion);increase endurance/gait distance;decrease fall risk;assistive device use;walker,  rolling  -ND     [place] Level (Gait Training Goal 1, PT) minimum assist (75% or more patient effort)  -ND     Distance (Gait Training Goal 1, PT) 75  -ND     Time Frame (Gait Training Goal 1, PT) long term goal (LTG);10 days  -ND       Row Name 04/28/24 1149          Therapy Assessment/Plan (PT)    Planned Therapy Interventions (PT) balance training;bed mobility training;gait training;home exercise program;patient/family education;transfer training;postural re-education;ROM (range of motion);strengthening  -ND               User Key  (r) = Recorded By, (t) = Taken By, (c) = Cosigned By      Initials Name Provider Type    ND Tatum Martin, PT Physical Therapist                   Clinical Impression       Row Name 04/28/24 1147          Pain    Pretreatment Pain Rating 8/10  -ND     Posttreatment Pain Rating 8/10  -ND     Pain Location - Side/Orientation Right  -ND     Pain Location generalized  -ND     Pain Location - shoulder  -ND     Pain Intervention(s) Repositioned;Ambulation/increased activity  -ND       Row Name 04/28/24 1145          Plan of Care Review    Plan of Care Reviewed With patient  -ND     Outcome Evaluation PT evaluation completed. Pt presenting with significant generalized weakness, impaired balance and coordination, limited activity tolerance, and impaired sequencing of tasks warranting IP PT services to address deficits and facilitate return to PLOF. Recommend IRF following d/c for best outcome.  -ND       Row Name 04/28/24 1141          Therapy Assessment/Plan (PT)    Patient/Family Therapy Goals Statement (PT) Return to PLOF.  -ND     Rehab Potential (PT) good, to achieve stated therapy goals  -ND     Criteria for Skilled Interventions Met (PT) yes;meets criteria;skilled treatment is necessary  -ND     Therapy Frequency (PT) daily  -ND       Row Name 04/28/24 1149          Vital Signs    Post Systolic BP Rehab 102  -ND     Post Treatment Diastolic BP 59  -ND     Pretreatment Heart  Rate (beats/min) 66  -ND     Posttreatment Heart Rate (beats/min) 67  -ND     Pre SpO2 (%) 99  -ND     O2 Delivery Pre Treatment room air  -ND     O2 Delivery Intra Treatment room air  -ND     Post SpO2 (%) 97  -ND     O2 Delivery Post Treatment room air  -ND     Pre Patient Position Supine  -ND     Intra Patient Position Standing  -ND     Post Patient Position Sitting  -ND       Row Name 04/28/24 1147          Positioning and Restraints    Pre-Treatment Position in bed  -ND     Post Treatment Position chair  -ND     In Chair notified nsg;sitting;call light within reach;encouraged to call for assist;exit alarm on;waffle cushion;on mechanical lift sling;with family/caregiver  -ND               User Key  (r) = Recorded By, (t) = Taken By, (c) = Cosigned By      Initials Name Provider Type    Tatum Barron PT Physical Therapist                   Outcome Measures       Row Name 04/28/24 1149          How much help from another person do you currently need...    Turning from your back to your side while in flat bed without using bedrails? 3  -ND     Moving from lying on back to sitting on the side of a flat bed without bedrails? 3  -ND     Moving to and from a bed to a chair (including a wheelchair)? 2  -ND     Standing up from a chair using your arms (e.g., wheelchair, bedside chair)? 2  -ND     Climbing 3-5 steps with a railing? 1  -ND     To walk in hospital room? 1  -ND     AM-PAC 6 Clicks Score (PT) 12  -ND     Highest Level of Mobility Goal 4 --> Transfer to chair/commode  -ND       Row Name 04/28/24 1149          Functional Assessment    Outcome Measure Options AM-PAC 6 Clicks Basic Mobility (PT)  -ND               User Key  (r) = Recorded By, (t) = Taken By, (c) = Cosigned By      Initials Name Provider Type    Tatum Barron PT Physical Therapist                                 Physical Therapy Education       Title: PT OT SLP Therapies (In Progress)       Topic: Physical Therapy (In Progress)        Point: Mobility training (In Progress)       Learning Progress Summary             Patient Acceptance, E, NR by ND at 4/28/2024 1150   Family Acceptance, E, NR by ND at 4/28/2024 1150                         Point: Home exercise program (Not Started)       Learner Progress:  Not documented in this visit.              Point: Body mechanics (In Progress)       Learning Progress Summary             Patient Acceptance, E, NR by ND at 4/28/2024 1150   Family Acceptance, E, NR by ND at 4/28/2024 1150                         Point: Precautions (In Progress)       Learning Progress Summary             Patient Acceptance, E, NR by ND at 4/28/2024 1150   Family Acceptance, E, NR by ND at 4/28/2024 1150                                         User Key       Initials Effective Dates Name Provider Type Discipline    ND 11/16/23 -  Tatum Martin, PT Physical Therapist PT                  PT Recommendation and Plan  Planned Therapy Interventions (PT): balance training, bed mobility training, gait training, home exercise program, patient/family education, transfer training, postural re-education, ROM (range of motion), strengthening  Plan of Care Reviewed With: patient  Outcome Evaluation: PT evaluation completed. Pt presenting with significant generalized weakness, impaired balance and coordination, limited activity tolerance, and impaired sequencing of tasks warranting IP PT services to address deficits and facilitate return to PLOF. Recommend IRF following d/c for best outcome.     Time Calculation:   PT Evaluation Complexity  History, PT Evaluation Complexity: 3 or more personal factors and/or comorbidities  Examination of Body Systems (PT Eval Complexity): total of 4 or more elements  Clinical Presentation (PT Evaluation Complexity): evolving  Clinical Decision Making (PT Evaluation Complexity): moderate complexity  Overall Complexity (PT Evaluation Complexity): moderate complexity     PT Charges       Row Name  04/28/24 1150             Time Calculation    Start Time 1041  -ND      PT Received On 04/28/24  -ND      PT Goal Re-Cert Due Date 05/08/24  -ND         Timed Charges    03935 - PT Therapeutic Activity Minutes 10  -ND         Untimed Charges    PT Eval/Re-eval Minutes 48  -ND         Total Minutes    Timed Charges Total Minutes 10  -ND      Untimed Charges Total Minutes 48  -ND       Total Minutes 58  -ND                User Key  (r) = Recorded By, (t) = Taken By, (c) = Cosigned By      Initials Name Provider Type    ND Tatum Martin, PT Physical Therapist                  Therapy Charges for Today       Code Description Service Date Service Provider Modifiers Qty    85624536108 HC PT THERAPEUTIC ACT EA 15 MIN 4/28/2024 Tatum Martin, PT GP 1    48487439716 HC PT EVAL MOD COMPLEXITY 4 4/28/2024 Tatum Martin, PT GP 1            PT G-Codes  Outcome Measure Options: AM-PAC 6 Clicks Basic Mobility (PT)  AM-PAC 6 Clicks Score (PT): 12  PT Discharge Summary  Anticipated Discharge Disposition (PT): inpatient rehabilitation facility    Tatum Martin, RANJANA  4/28/2024

## 2024-04-28 NOTE — CONSULTS
Palliative Care Initial Consult   Attending Physician: Lola Rodrigues MD  Referring Provider: Dr. Rajwinder Freeman    Reason for Referral:  assistance with clarification of goals of care    Code Status:   Code Status and Medical Interventions:   Ordered at: 04/27/24 2143     Medical Intervention Limits:    NO intubation (DNI)    NO cardioversion    NO antiarrhythmic drugs    NO dialysis    NO artificial nutrition     Code Status (Patient has no pulse and is not breathing):    No CPR (Do Not Attempt to Resuscitate)     Medical Interventions (Patient has pulse or is breathing):    Limited Support      Advanced Directives:    Family/Support: Jose Juan Clark (son), Leni Clark (spouse)  Goals of Care: TBD.    HPI: Onel Clark is a 89 y.o. male with PMH significant for cirrhosis requiring frequent paracentesis, T2DM, TIA, HTN, Atrial flutter on chronic anticoagulation, PVD, CKD III, chronic debility. Patient presented to [facility] ED on 4/27 from home where he lives with his wife due to confusion and restlessness. Work up revealed elevated CRP, leukocytosis, and hypomagnesemia. CT showing abdominal ascites, diabetic left foot wound 1st toe and heel. Elevated Cr noted. GI following for decompensated liver cirrhosis, patient with three paracentesis this year (Jan/March/April) recommending hospice. Palliative Care consulted for GOC in the context of complex medical decision making.   Initial visit with patient finishing up lunch, pleasantly confused. Denies pain, shortness of breath, anxiety. He is able to answer questions regarding ROS but unable to engage in GOC discussion.   Return visit with wife, son, and daughter-in-law at bedside. Patient sleeping throughout visit. Patient requires lift chair at home, uses a FWW, limited mobility, limited to house.     ROS: Denies pain, shortness of breath, anxiety, nausea.       Past Medical History:   Diagnosis Date    Acquired hypothyroidism     Allergic     Allergies     Arthritis      Benign prostatic hyperplasia with nocturia     Bilateral carotid artery disease     Cancer of the skin, basal cell     Cataract     CHF (congestive heart failure)     Chronic non-seasonal allergic rhinitis     DUE TO POLLEN    Chronic renal impairment     Cirrhosis of liver 04/27/2024    CKD (chronic kidney disease) stage 3, GFR 30-59 ml/min 04/27/2024    COPD (chronic obstructive pulmonary disease)     Coronary artery disease     Decreased hearing of both ears     Degenerative lumbar spinal stenosis     Diabetes mellitus     Elevated PSA     Frequent falls     GERD without esophagitis     Heart murmur     High risk medication use     HL (hearing loss)     Hx of bilateral hip replacements     Hx of decompressive lumbar laminectomy     Hx of total knee replacement, bilateral     Hx of transient ischemic attack (TIA)     Hypertension     Mixed hyperlipidemia due to type 2 diabetes mellitus     Nicotine dependence     No natural teeth     FALSE TEETH    Osteoarthritis     Primary hypertension     Primary osteoarthritis involving multiple joints     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 2 diabetes mellitus     PVD (peripheral vascular disease) 04/27/2024    Stroke TIA  2 years ago    Thyroid disease     TIA (transient ischemic attack)     Type 2 diabetes mellitus with diabetic polyneuropathy     Type 2 diabetes mellitus with stage 2 chronic kidney disease, without long-term current use of insulin      Past Surgical History:   Procedure Laterality Date    BACK SURGERY  2007    Lumbar Laminectomy    CARPAL TUNNEL RELEASE  2012    COLONOSCOPY      EYE SURGERY  cataracts    JOINT REPLACEMENT  both hips & knee & back    REPLACEMENT TOTAL KNEE Left 2013    SPINE SURGERY  about 15 yrs ago    TOTAL HIP ARTHROPLASTY Right 2016     Social History     Socioeconomic History    Marital status:     Number of children: 1   Tobacco Use    Smoking status: Former     Current packs/day: 0.00     Average packs/day: 1 pack/day for 10.0 years  "(10.0 ttl pk-yrs)     Types: Cigarettes     Start date:      Quit date:      Years since quittin.3     Passive exposure: Past    Smokeless tobacco: Never    Tobacco comments:     None   Vaping Use    Vaping status: Never Used   Substance and Sexual Activity    Alcohol use: Yes     Alcohol/week: 1.0 standard drink of alcohol     Types: 1 Glasses of wine per week     Comment: None    Drug use: No    Sexual activity: Not Currently     Partners: Male     Comment: With  (male) until lately - old age problems     Family History   Problem Relation Age of Onset    Alzheimer's disease Mother     Diabetes Mother     Hyperlipidemia Mother     Heart attack Father     Stroke Father     Coronary artery disease Father     Hearing loss Father     Hyperlipidemia Father     Diabetes Sister     Hypertension Sister     Hyperlipidemia Sister     Arthritis Sister     Arthritis Brother     Diabetes Brother     Hearing loss Brother        No Known Allergies    Current medication reviewed for route, type, dose and frequency and are current per MAR at time of dictation.    Palliative Performance Scale Score:  40%    /62 (BP Location: Right arm, Patient Position: Lying)   Pulse 66   Temp 97.8 °F (36.6 °C) (Oral)   Resp 18   Ht 182.9 cm (72\")   Wt 90.3 kg (199 lb)   SpO2 96%   BMI 26.99 kg/m²     Intake/Output Summary (Last 24 hours) at 2024 0940  Last data filed at 2024 0634  Gross per 24 hour   Intake 1551 ml   Output 225 ml   Net 1326 ml       Physical Exam:    General Appearance:    Patient sitting up in chair, awake, alert, A/C ill appearing, frail, cooperative, NAD   HEENT:    NC/AT, EOMI, anicteric, MMM, face relaxed   Neck:   supple, trachea midline, no JVD   Lungs:     CTA bilat, diminished in bases; respirations regular, even and unlabored; RR 16-18 on exam    Heart:    murmur, HR 64   Abdomen:     Normal bowel sounds, soft, nontender, distended, ascites   G/U:   Deferred   MSK/Extremities:  "  Wasting, no edema, wounds to BLE's with dressings CDI   Pulses:   Pulses palpable and equal bilaterally   Skin:   Warm, dry   Neurologic:   A/Ox1, pleasantly confused,  cooperative, PÉREZ   Psych:   Calm, appropriate         Labs:   Results from last 7 days   Lab Units 04/28/24  0906   WBC 10*3/mm3 15.00*   HEMOGLOBIN g/dL 10.2*   HEMATOCRIT % 30.9*   PLATELETS 10*3/mm3 335     Results from last 7 days   Lab Units 04/28/24  0440   SODIUM mmol/L 139   POTASSIUM mmol/L 4.2   CHLORIDE mmol/L 103   CO2 mmol/L 24.0   BUN mg/dL 49*   CREATININE mg/dL 2.18*   GLUCOSE mg/dL 165*   CALCIUM mg/dL 8.6     Results from last 7 days   Lab Units 04/28/24  0440 04/27/24  1755 04/27/24  1257   SODIUM mmol/L 139   < > 138   POTASSIUM mmol/L 4.2   < > 4.8   CHLORIDE mmol/L 103   < > 102   CO2 mmol/L 24.0   < > 24.0   BUN mg/dL 49*   < > 51*   CREATININE mg/dL 2.18*   < > 2.25*   CALCIUM mg/dL 8.6   < > 8.8   BILIRUBIN mg/dL  --   --  1.0   ALK PHOS U/L  --   --  222*   ALT (SGPT) U/L  --   --  9   AST (SGOT) U/L  --   --  18   GLUCOSE mg/dL 165*   < > 134*    < > = values in this interval not displayed.     Imaging Results (Last 72 Hours)       Procedure Component Value Units Date/Time    MRI Brain Without Contrast [[id number]] Resulted: 04/28/24 0434     Updated: 04/28/24 0441    CT Chest Without Contrast Diagnostic [[id number]] Collected: 04/27/24 1520     Updated: 04/27/24 1533    Narrative:      CT HEAD WO CONTRAST, CT CHEST WO CONTRAST DIAGNOSTIC, CT ABDOMEN PELVIS WO CONTRAST    Date of Exam: 4/27/2024 2:51 PM EDT    Indication: AMS, confusion.    Comparison: 12/4/2023 chest CT and 1/3/2024 PET/CT    Technique: Axial CT images were obtained of the head, chest, abdomen, and pelvis without contrast administration.  Automated exposure control and iterative construction methods were used.      Findings:  CT HEAD:  There is no evidence of acute intracranial hemorrhage, mass, midline shift, loss of gray-white matter differentiation, or  extra-axial fluid collection. Subcortical periventricular white matter hypodensities are present consistent with small vessel   ischemic disease. There is extensive global parenchymal volume loss with ex vacuo dilation of the ventricular system. The basal cisterns are patent.    There is no fracture or suspicious osseous lesion. Mild mucosal thickening of the bilateral maxillary sinuses. Partial mastoid air cell effusions. Bilateral ocular surgery. Soft tissues are unremarkable.    CT CHEST:  The central airways are patent. There are hypoventilatory changes of the lung bases, left greater than right, with associated left lower lobe rounded atelectasis. This is unchanged from recent PET/CT. There is no new focal airspace consolidation. No new   suspicious pulmonary nodule or mass. Unchanged small left pleural effusion, likely chronic given associated pleural wall thickening.    Unchanged cardiomegaly. Dense atherosclerotic calcifications of the coronary arteries and aortic valve. There are also dense thoracic aortic calcifications. Trace pericardial effusion. No mediastinal mass or threshold lymphadenopathy.    Chest wall soft tissues show no acute abnormality. Gynecomastia. No evidence of chest wall fracture or suspicious osseous lesion.    CT ABDOMEN/PELVIS:  Cirrhotic morphology of the liver. No suspicious hepatic lesion.    The gallbladder is unremarkable. No significant biliary ductal dilation.    The spleen, pancreas, and bilateral adrenal glands show no acute abnormality.    There is a small right superior pole renal cyst, unchanged. Bilateral renal parenchymal scarring/thinning. No hydronephrosis or nephrolithiasis. No suspicious renal lesion.    Small hiatal hernia. Thickening of the gastric wall which may be secondary to under distention. There is no evidence of bowel obstruction. Colonic diverticulosis without definite evidence of diverticulitis. Appendix is not well visualized and may be   surgically  absent or decompressed.    There is no evidence of suspicious lymphadenopathy. There are shotty retroperitoneal and mesenteric lymph nodes which are unchanged from January 2024 PET/CT. There is moderate abdominopelvic ascites with autumn mesentery. No evidence of abdominal aortic   aneurysm. Dense atherosclerotic calcifications of the aorta and branch vessels.    The urinary bladder and prostate is difficult to evaluate given adjacent metallic artifact, however is grossly unremarkable.    The abdominal and pelvic wall soft tissues show no acute abnormality. There is mild diffuse fatty muscle atrophy without discrete fluid collection.    Bilateral hip arthroplasties. Cerclage fixation of the right intertrochanteric region. Chronic compression deformity of L1 vertebral body. No evidence of acute fracture or suspicious bony lesion.      Impression:      Impression:  CT HEAD: No acute intracranial abnormality. Chronic sequela of probable small vessel ischemic disease and severe global parenchymal volume loss.    CT CHEST: No acute abnormality in the chest. Bilateral hypoventilatory changes with round atelectasis in the left lung base. Chronic adjacent small left pleural effusion with pleural wall thickening, not significantly changed from December 2023 chest CT.   Chronic ancillary findings as above.    CT ABDOMEN/PELVIS: No acute abnormality in the abdomen or pelvis. Cirrhosis with moderate abdominopelvic ascites. Chronic ancillary findings as above.        Electronically Signed: Kenroy Mcneil MD    4/27/2024 3:30 PM EDT    Workstation ID: [id number]    CT Abdomen Pelvis Without Contrast [[id number]] Collected: 04/27/24 1520     Updated: 04/27/24 1533    Narrative:      CT HEAD WO CONTRAST, CT CHEST WO CONTRAST DIAGNOSTIC, CT ABDOMEN PELVIS WO CONTRAST    Date of Exam: 4/27/2024 2:51 PM EDT    Indication: AMS, confusion.    Comparison: 12/4/2023 chest CT and 1/3/2024 PET/CT    Technique: Axial CT images were obtained of the  head, chest, abdomen, and pelvis without contrast administration.  Automated exposure control and iterative construction methods were used.      Findings:  CT HEAD:  There is no evidence of acute intracranial hemorrhage, mass, midline shift, loss of gray-white matter differentiation, or extra-axial fluid collection. Subcortical periventricular white matter hypodensities are present consistent with small vessel   ischemic disease. There is extensive global parenchymal volume loss with ex vacuo dilation of the ventricular system. The basal cisterns are patent.    There is no fracture or suspicious osseous lesion. Mild mucosal thickening of the bilateral maxillary sinuses. Partial mastoid air cell effusions. Bilateral ocular surgery. Soft tissues are unremarkable.    CT CHEST:  The central airways are patent. There are hypoventilatory changes of the lung bases, left greater than right, with associated left lower lobe rounded atelectasis. This is unchanged from recent PET/CT. There is no new focal airspace consolidation. No new   suspicious pulmonary nodule or mass. Unchanged small left pleural effusion, likely chronic given associated pleural wall thickening.    Unchanged cardiomegaly. Dense atherosclerotic calcifications of the coronary arteries and aortic valve. There are also dense thoracic aortic calcifications. Trace pericardial effusion. No mediastinal mass or threshold lymphadenopathy.    Chest wall soft tissues show no acute abnormality. Gynecomastia. No evidence of chest wall fracture or suspicious osseous lesion.    CT ABDOMEN/PELVIS:  Cirrhotic morphology of the liver. No suspicious hepatic lesion.    The gallbladder is unremarkable. No significant biliary ductal dilation.    The spleen, pancreas, and bilateral adrenal glands show no acute abnormality.    There is a small right superior pole renal cyst, unchanged. Bilateral renal parenchymal scarring/thinning. No hydronephrosis or nephrolithiasis. No  suspicious renal lesion.    Small hiatal hernia. Thickening of the gastric wall which may be secondary to under distention. There is no evidence of bowel obstruction. Colonic diverticulosis without definite evidence of diverticulitis. Appendix is not well visualized and may be   surgically absent or decompressed.    There is no evidence of suspicious lymphadenopathy. There are shotty retroperitoneal and mesenteric lymph nodes which are unchanged from January 2024 PET/CT. There is moderate abdominopelvic ascites with autumn mesentery. No evidence of abdominal aortic   aneurysm. Dense atherosclerotic calcifications of the aorta and branch vessels.    The urinary bladder and prostate is difficult to evaluate given adjacent metallic artifact, however is grossly unremarkable.    The abdominal and pelvic wall soft tissues show no acute abnormality. There is mild diffuse fatty muscle atrophy without discrete fluid collection.    Bilateral hip arthroplasties. Cerclage fixation of the right intertrochanteric region. Chronic compression deformity of L1 vertebral body. No evidence of acute fracture or suspicious bony lesion.      Impression:      Impression:  CT HEAD: No acute intracranial abnormality. Chronic sequela of probable small vessel ischemic disease and severe global parenchymal volume loss.    CT CHEST: No acute abnormality in the chest. Bilateral hypoventilatory changes with round atelectasis in the left lung base. Chronic adjacent small left pleural effusion with pleural wall thickening, not significantly changed from December 2023 chest CT.   Chronic ancillary findings as above.    CT ABDOMEN/PELVIS: No acute abnormality in the abdomen or pelvis. Cirrhosis with moderate abdominopelvic ascites. Chronic ancillary findings as above.        Electronically Signed: Kenroy Mcneil MD    4/27/2024 3:30 PM EDT    Workstation ID: [id number]    CT Head Without Contrast [[id number]] Collected: 04/27/24 1520     Updated: 04/27/24  1533    Narrative:      CT HEAD WO CONTRAST, CT CHEST WO CONTRAST DIAGNOSTIC, CT ABDOMEN PELVIS WO CONTRAST    Date of Exam: 4/27/2024 2:51 PM EDT    Indication: AMS, confusion.    Comparison: 12/4/2023 chest CT and 1/3/2024 PET/CT    Technique: Axial CT images were obtained of the head, chest, abdomen, and pelvis without contrast administration.  Automated exposure control and iterative construction methods were used.      Findings:  CT HEAD:  There is no evidence of acute intracranial hemorrhage, mass, midline shift, loss of gray-white matter differentiation, or extra-axial fluid collection. Subcortical periventricular white matter hypodensities are present consistent with small vessel   ischemic disease. There is extensive global parenchymal volume loss with ex vacuo dilation of the ventricular system. The basal cisterns are patent.    There is no fracture or suspicious osseous lesion. Mild mucosal thickening of the bilateral maxillary sinuses. Partial mastoid air cell effusions. Bilateral ocular surgery. Soft tissues are unremarkable.    CT CHEST:  The central airways are patent. There are hypoventilatory changes of the lung bases, left greater than right, with associated left lower lobe rounded atelectasis. This is unchanged from recent PET/CT. There is no new focal airspace consolidation. No new   suspicious pulmonary nodule or mass. Unchanged small left pleural effusion, likely chronic given associated pleural wall thickening.    Unchanged cardiomegaly. Dense atherosclerotic calcifications of the coronary arteries and aortic valve. There are also dense thoracic aortic calcifications. Trace pericardial effusion. No mediastinal mass or threshold lymphadenopathy.    Chest wall soft tissues show no acute abnormality. Gynecomastia. No evidence of chest wall fracture or suspicious osseous lesion.    CT ABDOMEN/PELVIS:  Cirrhotic morphology of the liver. No suspicious hepatic lesion.    The gallbladder is  unremarkable. No significant biliary ductal dilation.    The spleen, pancreas, and bilateral adrenal glands show no acute abnormality.    There is a small right superior pole renal cyst, unchanged. Bilateral renal parenchymal scarring/thinning. No hydronephrosis or nephrolithiasis. No suspicious renal lesion.    Small hiatal hernia. Thickening of the gastric wall which may be secondary to under distention. There is no evidence of bowel obstruction. Colonic diverticulosis without definite evidence of diverticulitis. Appendix is not well visualized and may be   surgically absent or decompressed.    There is no evidence of suspicious lymphadenopathy. There are shotty retroperitoneal and mesenteric lymph nodes which are unchanged from January 2024 PET/CT. There is moderate abdominopelvic ascites with autumn mesentery. No evidence of abdominal aortic   aneurysm. Dense atherosclerotic calcifications of the aorta and branch vessels.    The urinary bladder and prostate is difficult to evaluate given adjacent metallic artifact, however is grossly unremarkable.    The abdominal and pelvic wall soft tissues show no acute abnormality. There is mild diffuse fatty muscle atrophy without discrete fluid collection.    Bilateral hip arthroplasties. Cerclage fixation of the right intertrochanteric region. Chronic compression deformity of L1 vertebral body. No evidence of acute fracture or suspicious bony lesion.      Impression:      Impression:  CT HEAD: No acute intracranial abnormality. Chronic sequela of probable small vessel ischemic disease and severe global parenchymal volume loss.    CT CHEST: No acute abnormality in the chest. Bilateral hypoventilatory changes with round atelectasis in the left lung base. Chronic adjacent small left pleural effusion with pleural wall thickening, not significantly changed from December 2023 chest CT.   Chronic ancillary findings as above.    CT ABDOMEN/PELVIS: No acute abnormality in the  abdomen or pelvis. Cirrhosis with moderate abdominopelvic ascites. Chronic ancillary findings as above.        Electronically Signed: Kenroy Mcneil MD    4/27/2024 3:30 PM EDT    Workstation ID: [id number]    XR Chest 1 View [[id number]] Collected: 04/27/24 1319     Updated: 04/27/24 1327    Narrative:      XR CHEST 1 VW    Date of Exam: 4/27/2024 12:50 PM EDT    Indication: Weak/Dizzy/AMS triage protocol    Comparison: PET/CT January 3, 2024, chest CT December 4, 2023, chest radiographs November 15, 2023.    Findings:  Left mid and lower lung pleural and parenchymal opacities appear similar to prior exams. There is a suspected small left pleural effusion component. No right lung infiltrate or pleural effusion. No significant pneumothorax component is identified. Heart   size appears within normal limits. There is atherosclerosis of the aorta.      Impression:      Impression:  1.Left mid and lower lung pleural and parenchymal opacities appear grossly similar to recent prior studies.  2.No definite radiographic findings of acute cardiopulmonary abnormality.      Electronically Signed: Samir Sanchez    4/27/2024 1:24 PM EDT    Workstation ID: [id number]                  Diagnostics: Reviewed    A:   Metabolic encephalopathy    Type 2 diabetes mellitus without complication, without long-term current use of insulin    Primary hypertension    Atrial flutter with controlled response    Chronic anticoagulation    PVD (peripheral vascular disease)    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 2 diabetes mellitus    Cirrhosis of liver    Hx-TIA (transient ischemic attack)    CHF (no ECHO on file currently)    Elevated serum creatinine    CKD (chronic kidney disease) stage 3, GFR 30-59 ml/min    Hypomagnesemia     89 y.o. male with cirrhosis of liver, metabolic encephalopathy, T2DM, CKD III, atrial flutter.   S/S:   AMS -metabolic encephalopathy  -continue rifaximin 550mg PO BID  -continue lactulose 10g oral daily    2.  Debility -PT/OT following    3. GOC -DNR/DNI/Multiple limitations -per discussion with family  -met with wife, son, and daughter-in-law  -reviewed options for ongoing full treatment versus comfort focused plan of care with hospice services  -family is considering home with hospice services, hospice liaison to follow up on Monday    P: Introduced Palliative Care and services to patient and family. Met with family to discuss GOC. Reviewed ongoing full treatment versus comfort focused plan of care with hospice services. Son reports that they would like to limit hospitalizations and instead focus on symptom management at home throughout end of life. They are in agreement with having hospice liaison follow up on Monday to discuss services and if they are in agreement, set up discharge/DME, etc. They are interested in drain placement prior to discharge if they are going home with hospice. All questions and concerns addressed. Hospitalist updated on GOC.     Thank you for this consult and allowing us to participate in patient's plan of care. Palliative Care Team will continue to follow patient. Please do not hesitate to contact us regarding further symptom management or goals of care needs.  Time: 60 minutes spent reviewing medical and medication records, assessing and examining patient, discussing with family, answering questions, providing some guidance about a plan and documentation of care, and coordinating care with other healthcare members, with > 50% time spent face to face.         Candi Yi, APRN  4/28/2024

## 2024-04-28 NOTE — CONSULTS
Patient Care Team:  Ilir Fair MD as PCP - General (Family Medicine)  Luis Eduardo Echeverria MD as Consulting Physician (Orthopedic Surgery)    Chief complaint: AMS    History of Present Illness: Mr West is a 89 yo gentleman w past medical hx of liver cirrhosis, Aortic stenosis, mild CKD. He is admitted yesterday with worsening confusion. Patient has been getting paracentesis for recurrent ascites. Per the family patient underwent last paracentesis on 4/18/24 and 5 liter fluid was taken off. Patient has no prior hx of formal diagnosis for CKD. Review of labs suggest baseline cr ~ 1.2-1.3mg/dl. However his renal function has been gradually worsening. Cr trends in last 3-4 months 1.3>1.9>2.0>2.1 on recent testing. He rosalind any NSAID use. Per the family patient was taking combination of spironolactone and Bumex but spironolactone was stopped by cardiologist 1 month ago. Patient is currently sitting in chair rosalind any specific issues, No significant LE edema noted. Appetite is ok. Ammonia level on admission 11    Review of Systems   Constitutional: Negative.    HENT: Negative.     Respiratory: Negative.     Cardiovascular: Negative.    Gastrointestinal: Negative.    Genitourinary: Negative.    Musculoskeletal: Negative.    Psychiatric/Behavioral:  Positive for confusion.         Past Medical History:   Diagnosis Date    Acquired hypothyroidism     Allergic     Allergies     Arthritis     Benign prostatic hyperplasia with nocturia     Bilateral carotid artery disease     Cancer of the skin, basal cell     Cataract     CHF (congestive heart failure)     Chronic non-seasonal allergic rhinitis     DUE TO POLLEN    Chronic renal impairment     Cirrhosis of liver 04/27/2024    CKD (chronic kidney disease) stage 3, GFR 30-59 ml/min 04/27/2024    COPD (chronic obstructive pulmonary disease)     Coronary artery disease     Decreased hearing of both ears     Degenerative lumbar spinal stenosis     Diabetes mellitus      Elevated PSA     Frequent falls     GERD without esophagitis     Heart murmur     High risk medication use     HL (hearing loss)     Hx of bilateral hip replacements     Hx of decompressive lumbar laminectomy     Hx of total knee replacement, bilateral     Hx of transient ischemic attack (TIA)     Hypertension     Mixed hyperlipidemia due to type 2 diabetes mellitus     Nicotine dependence     No natural teeth     FALSE TEETH    Osteoarthritis     Primary hypertension     Primary osteoarthritis involving multiple joints     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 2 diabetes mellitus     PVD (peripheral vascular disease) 2024    Stroke TIA  2 years ago    Thyroid disease     TIA (transient ischemic attack)     Type 2 diabetes mellitus with diabetic polyneuropathy     Type 2 diabetes mellitus with stage 2 chronic kidney disease, without long-term current use of insulin    ,   Past Surgical History:   Procedure Laterality Date    BACK SURGERY      Lumbar Laminectomy    CARPAL TUNNEL RELEASE      COLONOSCOPY      EYE SURGERY  cataracts    JOINT REPLACEMENT  both hips & knee & back    REPLACEMENT TOTAL KNEE Left 2013    SPINE SURGERY  about 15 yrs ago    TOTAL HIP ARTHROPLASTY Right 2016   ,   Family History   Problem Relation Age of Onset    Alzheimer's disease Mother     Diabetes Mother     Hyperlipidemia Mother     Heart attack Father     Stroke Father     Coronary artery disease Father     Hearing loss Father     Hyperlipidemia Father     Diabetes Sister     Hypertension Sister     Hyperlipidemia Sister     Arthritis Sister     Arthritis Brother     Diabetes Brother     Hearing loss Brother    ,   Social History     Socioeconomic History    Marital status:     Number of children: 1   Tobacco Use    Smoking status: Former     Current packs/day: 0.00     Average packs/day: 1 pack/day for 10.0 years (10.0 ttl pk-yrs)     Types: Cigarettes     Start date:      Quit date:      Years since quittin.3      Passive exposure: Past    Smokeless tobacco: Never    Tobacco comments:     None   Vaping Use    Vaping status: Never Used   Substance and Sexual Activity    Alcohol use: Yes     Alcohol/week: 1.0 standard drink of alcohol     Types: 1 Glasses of wine per week     Comment: None    Drug use: No    Sexual activity: Not Currently     Partners: Male     Comment: With  (male) until lately - old age problems     E-cigarette/Vaping    E-cigarette/Vaping Use Never User      E-cigarette/Vaping Substances     E-cigarette/Vaping Devices         ,   Medications Prior to Admission   Medication Sig Dispense Refill Last Dose    amLODIPine (NORVASC) 5 MG tablet TAKE 1 TABLET BY MOUTH EVERY DAY 90 tablet 0 4/26/2024    apixaban (ELIQUIS) 2.5 MG tablet tablet Take 1 tablet by mouth Daily. (Patient taking differently: Take 1 tablet by mouth Daily. BID) 180 tablet 3 4/26/2024    ASPIRIN 81 PO Take 81 mg by mouth Daily.   4/26/2024    atorvastatin (LIPITOR) 80 MG tablet TAKE 1 TABLET BY MOUTH AT BEDTIME 90 tablet 1 4/26/2024    bumetanide (BUMEX) 2 MG tablet Take 1 tablet by mouth Daily. 90 tablet 3 4/26/2024    CALCIUM CARB-CHOLECALCIFEROL PO Take  by mouth.   4/26/2024    Cetirizine HCl 10 MG capsule Take 10 mg by mouth Daily With Breakfast. Indications: Hayfever   4/26/2024    levothyroxine (SYNTHROID, LEVOTHROID) 137 MCG tablet TAKE 1 TABLET BY MOUTH EVERY MORNING 90 tablet 0 4/26/2024    losartan (COZAAR) 50 MG tablet TAKE 1 TABLET BY MOUTH EVERY DAY 90 tablet 0 4/26/2024    Magnesium 400 MG capsule Take 400 mg by mouth Daily.   4/26/2024    meloxicam (MOBIC) 7.5 MG tablet    4/26/2024    memantine (NAMENDA) 5 MG tablet TAKE 1 TABLET BY MOUTH 2 TIMES A  tablet 0 4/26/2024    metFORMIN (GLUCOPHAGE) 1000 MG tablet Take 1 tablet by mouth 2 (Two) Times a Day With Meals. 180 tablet 1 4/26/2024    multivitamin with minerals (MENS MULTI VITAMIN & MINERAL PO) Take 1 tablet by mouth Daily.   4/26/2024    omeprazole (priLOSEC)  40 MG capsule TAKE 1 CAPSULE BY MOUTH EVERY DAY 90 capsule 0 4/26/2024    vitamin C (ASCORBIC ACID) 250 MG tablet Take 1 tablet by mouth Daily.   4/26/2024   , and Scheduled Meds:  albumin human, 50 g, Intravenous, BID  amLODIPine, 5 mg, Oral, Daily  apixaban, 2.5 mg, Oral, BID  aspirin, 81 mg, Oral, Daily  atorvastatin, 80 mg, Oral, Daily  insulin lispro, 2-7 Units, Subcutaneous, 4x Daily AC & at Bedtime  lactulose, 10 g, Oral, Daily  levothyroxine, 137 mcg, Oral, QAM  memantine, 5 mg, Oral, BID  midodrine, 5 mg, Oral, TID AC  multivitamin with minerals, 1 tablet, Oral, Daily  pantoprazole, 40 mg, Oral, Q AM  piperacillin-tazobactam, 3.375 g, Intravenous, Q8H  rifAXIMin, 550 mg, Oral, Q12H  senna-docusate sodium, 2 tablet, Oral, BID  sodium chloride, 10 mL, Intravenous, Q12H  vancomycin (dosing per levels), , Does not apply, Daily       Objective     Vital Signs  Temp:  [97.6 °F (36.4 °C)-98.8 °F (37.1 °C)] 98 °F (36.7 °C)  Heart Rate:  [53-73] 64  Resp:  [16-18] 18  BP: (102-132)/(54-66) 102/59    No intake/output data recorded.  I/O last 3 completed shifts:  In: 1551 [P.O.:236; I.V.:1015; IV Piggyback:300]  Out: 225 [Urine:225]    Physical Exam  Constitutional:       General: He is not in acute distress.     Appearance: Normal appearance. He is not ill-appearing.   HENT:      Head: Normocephalic.      Nose: Nose normal.      Mouth/Throat:      Mouth: Mucous membranes are moist.   Eyes:      Pupils: Pupils are equal, round, and reactive to light.   Cardiovascular:      Rate and Rhythm: Normal rate and regular rhythm.   Pulmonary:      Effort: Pulmonary effort is normal.   Abdominal:      General: Abdomen is flat. There is distension.   Musculoskeletal:      Cervical back: Normal range of motion.      Right lower leg: No edema.      Left lower leg: No edema.   Skin:     General: Skin is warm.   Neurological:      General: No focal deficit present.      Mental Status: He is alert. Mental status is at baseline.       "Comments: Baseline dementia         Results Review:    I reviewed the patient's new clinical results.    WBC WBC   Date Value Ref Range Status   04/28/2024 15.00 (H) 3.40 - 10.80 10*3/mm3 Final   04/27/2024 15.76 (H) 3.40 - 10.80 10*3/mm3 Final      HGB Hemoglobin   Date Value Ref Range Status   04/28/2024 10.2 (L) 13.0 - 17.7 g/dL Final   04/27/2024 10.5 (L) 13.0 - 17.7 g/dL Final      HCT Hematocrit   Date Value Ref Range Status   04/28/2024 30.9 (L) 37.5 - 51.0 % Final   04/27/2024 32.0 (L) 37.5 - 51.0 % Final      Platlets No results found for: \"LABPLAT\"   MCV MCV   Date Value Ref Range Status   04/28/2024 87.5 79.0 - 97.0 fL Final   04/27/2024 87.9 79.0 - 97.0 fL Final          Sodium Sodium   Date Value Ref Range Status   04/28/2024 139 136 - 145 mmol/L Final   04/27/2024 139 136 - 145 mmol/L Final   04/27/2024 138 136 - 145 mmol/L Final      Potassium Potassium   Date Value Ref Range Status   04/28/2024 4.2 3.5 - 5.2 mmol/L Final   04/27/2024 4.6 3.5 - 5.2 mmol/L Final   04/27/2024 4.8 3.5 - 5.2 mmol/L Final      Chloride Chloride   Date Value Ref Range Status   04/28/2024 103 98 - 107 mmol/L Final   04/27/2024 103 98 - 107 mmol/L Final   04/27/2024 102 98 - 107 mmol/L Final      CO2 CO2   Date Value Ref Range Status   04/28/2024 24.0 22.0 - 29.0 mmol/L Final   04/27/2024 23.0 22.0 - 29.0 mmol/L Final   04/27/2024 24.0 22.0 - 29.0 mmol/L Final      BUN BUN   Date Value Ref Range Status   04/28/2024 49 (H) 8 - 23 mg/dL Final   04/27/2024 51 (H) 8 - 23 mg/dL Final   04/27/2024 51 (H) 8 - 23 mg/dL Final      Creatinine Creatinine   Date Value Ref Range Status   04/28/2024 2.18 (H) 0.76 - 1.27 mg/dL Final   04/27/2024 2.09 (H) 0.76 - 1.27 mg/dL Final   04/27/2024 2.25 (H) 0.76 - 1.27 mg/dL Final      Calcium Calcium   Date Value Ref Range Status   04/28/2024 8.6 8.2 - 9.6 mg/dL Final   04/27/2024 8.6 8.2 - 9.6 mg/dL Final   04/27/2024 8.8 8.2 - 9.6 mg/dL Final      PO4 No results found for: \"CAPO4\"   Albumin " "Albumin   Date Value Ref Range Status   04/27/2024 3.0 (L) 3.5 - 5.2 g/dL Final      Magnesium Magnesium   Date Value Ref Range Status   04/28/2024 2.5 (H) 1.7 - 2.3 mg/dL Final   04/27/2024 1.5 (L) 1.7 - 2.3 mg/dL Final   04/27/2024 1.5 (L) 1.7 - 2.3 mg/dL Final      Uric Acid No results found for: \"URICACID\"         Assessment & Plan       Metabolic encephalopathy    Type 2 diabetes mellitus without complication, without long-term current use of insulin    Primary hypertension    Atrial flutter with controlled response    Chronic anticoagulation    PVD (peripheral vascular disease)    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 2 diabetes mellitus    Cirrhosis of liver    Hx-TIA (transient ischemic attack)    CHF (no ECHO on file currently)    Elevated serum creatinine    CKD (chronic kidney disease) stage 3, GFR 30-59 ml/min    Hypomagnesemia      Assessment & Plan    CHICO:  Cr at baseline 1.2-1.3 mg/dl. Most recent cr ~ 2.0mg/dl. UA bland. Suspect hemodynamic variation in renal function. Risk factor low blood pressure, large volume paracentesis 10 days ago, and ARB+ diuretic use. HRS is also a possibility. Pending urine Na, cl and creatinine.     CKD stage III: Baseline cr 1.2-1.3mg/dl. Age related changes, Additional risk factor for nephrosclerosis, HTN, AS, and Liver disease     AMS: Noted on admission. Improving. Hepatic encephalopathy?. Ammonia normal. Less likely due to uremia. Infectious etiology unidentified     Hypomagnesemia: On admission. Req supp     Hx of liver cirrhosis: complication include recurrent ascites. Requiring paracentesis.     Recs  Start albumin 50mg BID x 2 days for volume expansion   Start midodrine 5 mg TID to target SBP btw 120-130  Check urine na and cr to calculate FeNa  Hold diuretics and ARB for now  Discussed with family at bedside awaiting palliative consult patient may transition to home hospice. If the plan is for discharge then continue midodrine on discharge. Hold ARB " and use diuretics on as needed basis.     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 Pt took tylenol approx 30 min PTA and sts since taking it HA has gotten better. Pt denies weakness, numbness, n/v/d, CP, SOB, Visual changes, head trauma, AC use.

## 2024-04-28 NOTE — PLAN OF CARE
Goal Outcome Evaluation:  Plan of Care Reviewed With: patient           Outcome Evaluation: PT evaluation completed. Pt presenting with significant generalized weakness, impaired balance and coordination, limited activity tolerance, and impaired sequencing of tasks warranting IP PT services to address deficits and facilitate return to PLOF. Recommend IRF following d/c for best outcome.      Anticipated Discharge Disposition (PT): inpatient rehabilitation facility

## 2024-04-28 NOTE — PROGRESS NOTES
[facility] Medicine Services  PROGRESS NOTE    Patient Name: Onel Clark  : 3/25/1933  MRN: [id number]    Date of Admission: 2024  Primary Care Physician: Ilir Fair MD    Subjective   Subjective     CC:  Confusion    HPI:  Patient seen this morning with patient's son and wife at the bedside.  They report that his mentation has improved from yesterday.  They do report in the morning his confusion is worse at home.  He does require a lift chair at home and remains mostly in chair.       Objective   Objective     Vital Signs:   Temp:  [97.6 °F (36.4 °C)-98.7 °F (37.1 °C)] 98 °F (36.7 °C)  Heart Rate:  [53-73] 64  Resp:  [16-18] 18  BP: (102-125)/(54-63) 102/59     Physical Exam:  Constitutional: No acute distress, awake, alert  HENT: NCAT, mucous membranes moist  Respiratory: Clear to auscultation bilaterally, respiratory effort normal   Cardiovascular: irregular rate, systolic murmur, rubs, or gallops  Gastrointestinal: Positive bowel sounds, soft, nontender, nondistended  Musculoskeletal: No bilateral ankle edema  Psychiatric: Appropriate affect, cooperative  Neurologic: Oriented x 2 self and time, does know city but not state ,speech clear, globally weak. Does have some confusion.   Skin: No rashes    Results Reviewed:  LAB RESULTS:      Lab 24  0906 24  1538 24  1257   WBC 15.00*  --  15.76*   HEMOGLOBIN 10.2*  --  10.5*   HEMATOCRIT 30.9*  --  32.0*   PLATELETS 335  --  352   NEUTROS ABS 12.38*  --  12.54*   IMMATURE GRANS (ABS) 0.06*  --  0.05   LYMPHS ABS 1.10  --  1.88   MONOS ABS 1.41*  --  1.24*   EOS ABS 0.00  --  0.00   MCV 87.5  --  87.9   CRP  --  19.16*  --    PROCALCITONIN  --   --  1.52*   LACTATE  --   --  1.8         Lab 24  0440 24  1755 24  1538 24  1257   SODIUM 139 139  --  138   POTASSIUM 4.2 4.6  --  4.8   CHLORIDE 103 103  --  102   CO2 24.0 23.0  --  24.0   ANION GAP 12.0 13.0  --  12.0   BUN 49*  "51*  --  51*   CREATININE 2.18* 2.09*  --  2.25*   EGFR 27.9* 29.3*  --  26.9*   GLUCOSE 165* 124*  --  134*   CALCIUM 8.6 8.6  --  8.8   MAGNESIUM 2.5* 1.5*  --  1.5*   TSH  --   --  2.610  --          Lab 04/27/24  1257   TOTAL PROTEIN 6.0   ALBUMIN 3.0*   GLOBULIN 3.0   ALT (SGPT) 9   AST (SGOT) 18   BILIRUBIN 1.0   ALK PHOS 222*         Lab 04/27/24  1538 04/27/24  1257   HSTROP T 134* 134*                 Brief Urine Lab Results  (Last result in the past 365 days)        Color   Clarity   Blood   Leuk Est   Nitrite   Protein   CREAT   Urine HCG        04/27/24 1335 Yellow   Clear   Negative   Negative   Negative   Trace                   Cultures:  No results found for: \"BLOODCX\", \"URINECX\", \"WOUNDCX\", \"MRSACX\", \"RESPCX\", \"STOOLCX\"    Microbiology Results Abnormal       Procedure Component Value - Date/Time    Blood Culture - Blood, Arm, Left [[id number]]  (Normal) Collected: 04/27/24 1359    Lab Status: Preliminary result Specimen: Blood from Arm, Left Updated: 04/28/24 1430     Blood Culture No growth at 24 hours    Narrative:      Less than seven (7) mL's of blood was collected.  Insufficient quantity may yield false negative results.    MRSA Screen, PCR (Inpatient) - Swab, Nares [[id number]]  (Normal) Collected: 04/27/24 1401    Lab Status: Final result Specimen: Swab from Nares Updated: 04/27/24 1522     MRSA PCR Negative    Narrative:      The negative predictive value of this diagnostic test is high and should only be used to consider de-escalating anti-MRSA therapy. A positive result may indicate colonization with MRSA and must be correlated clinically.  MRSA Negative            CT Chest Without Contrast Diagnostic    Result Date: 4/27/2024  CT HEAD WO CONTRAST, CT CHEST WO CONTRAST DIAGNOSTIC, CT ABDOMEN PELVIS WO CONTRAST Date of Exam: 4/27/2024 2:51 PM EDT Indication: AMS, confusion. Comparison: 12/4/2023 chest CT and 1/3/2024 PET/CT Technique: Axial CT images were obtained of the head, chest, abdomen, " and pelvis without contrast administration.  Automated exposure control and iterative construction methods were used. Findings: CT HEAD: There is no evidence of acute intracranial hemorrhage, mass, midline shift, loss of gray-white matter differentiation, or extra-axial fluid collection. Subcortical periventricular white matter hypodensities are present consistent with small vessel ischemic disease. There is extensive global parenchymal volume loss with ex vacuo dilation of the ventricular system. The basal cisterns are patent. There is no fracture or suspicious osseous lesion. Mild mucosal thickening of the bilateral maxillary sinuses. Partial mastoid air cell effusions. Bilateral ocular surgery. Soft tissues are unremarkable. CT CHEST: The central airways are patent. There are hypoventilatory changes of the lung bases, left greater than right, with associated left lower lobe rounded atelectasis. This is unchanged from recent PET/CT. There is no new focal airspace consolidation. No new suspicious pulmonary nodule or mass. Unchanged small left pleural effusion, likely chronic given associated pleural wall thickening. Unchanged cardiomegaly. Dense atherosclerotic calcifications of the coronary arteries and aortic valve. There are also dense thoracic aortic calcifications. Trace pericardial effusion. No mediastinal mass or threshold lymphadenopathy. Chest wall soft tissues show no acute abnormality. Gynecomastia. No evidence of chest wall fracture or suspicious osseous lesion. CT ABDOMEN/PELVIS: Cirrhotic morphology of the liver. No suspicious hepatic lesion. The gallbladder is unremarkable. No significant biliary ductal dilation. The spleen, pancreas, and bilateral adrenal glands show no acute abnormality. There is a small right superior pole renal cyst, unchanged. Bilateral renal parenchymal scarring/thinning. No hydronephrosis or nephrolithiasis. No suspicious renal lesion. Small hiatal hernia. Thickening of the  gastric wall which may be secondary to under distention. There is no evidence of bowel obstruction. Colonic diverticulosis without definite evidence of diverticulitis. Appendix is not well visualized and may be surgically absent or decompressed. There is no evidence of suspicious lymphadenopathy. There are shotty retroperitoneal and mesenteric lymph nodes which are unchanged from January 2024 PET/CT. There is moderate abdominopelvic ascites with autumn mesentery. No evidence of abdominal aortic aneurysm. Dense atherosclerotic calcifications of the aorta and branch vessels. The urinary bladder and prostate is difficult to evaluate given adjacent metallic artifact, however is grossly unremarkable. The abdominal and pelvic wall soft tissues show no acute abnormality. There is mild diffuse fatty muscle atrophy without discrete fluid collection. Bilateral hip arthroplasties. Cerclage fixation of the right intertrochanteric region. Chronic compression deformity of L1 vertebral body. No evidence of acute fracture or suspicious bony lesion.     Impression: Impression: CT HEAD: No acute intracranial abnormality. Chronic sequela of probable small vessel ischemic disease and severe global parenchymal volume loss. CT CHEST: No acute abnormality in the chest. Bilateral hypoventilatory changes with round atelectasis in the left lung base. Chronic adjacent small left pleural effusion with pleural wall thickening, not significantly changed from December 2023 chest CT.  Chronic ancillary findings as above. CT ABDOMEN/PELVIS: No acute abnormality in the abdomen or pelvis. Cirrhosis with moderate abdominopelvic ascites. Chronic ancillary findings as above. Electronically Signed: Kenroy Mcneil MD  4/27/2024 3:30 PM EDT  Workstation ID: [id number]    CT Abdomen Pelvis Without Contrast    Result Date: 4/27/2024  CT HEAD WO CONTRAST, CT CHEST WO CONTRAST DIAGNOSTIC, CT ABDOMEN PELVIS WO CONTRAST Date of Exam: 4/27/2024 2:51 PM EDT Indication:  AMS, confusion. Comparison: 12/4/2023 chest CT and 1/3/2024 PET/CT Technique: Axial CT images were obtained of the head, chest, abdomen, and pelvis without contrast administration.  Automated exposure control and iterative construction methods were used. Findings: CT HEAD: There is no evidence of acute intracranial hemorrhage, mass, midline shift, loss of gray-white matter differentiation, or extra-axial fluid collection. Subcortical periventricular white matter hypodensities are present consistent with small vessel ischemic disease. There is extensive global parenchymal volume loss with ex vacuo dilation of the ventricular system. The basal cisterns are patent. There is no fracture or suspicious osseous lesion. Mild mucosal thickening of the bilateral maxillary sinuses. Partial mastoid air cell effusions. Bilateral ocular surgery. Soft tissues are unremarkable. CT CHEST: The central airways are patent. There are hypoventilatory changes of the lung bases, left greater than right, with associated left lower lobe rounded atelectasis. This is unchanged from recent PET/CT. There is no new focal airspace consolidation. No new suspicious pulmonary nodule or mass. Unchanged small left pleural effusion, likely chronic given associated pleural wall thickening. Unchanged cardiomegaly. Dense atherosclerotic calcifications of the coronary arteries and aortic valve. There are also dense thoracic aortic calcifications. Trace pericardial effusion. No mediastinal mass or threshold lymphadenopathy. Chest wall soft tissues show no acute abnormality. Gynecomastia. No evidence of chest wall fracture or suspicious osseous lesion. CT ABDOMEN/PELVIS: Cirrhotic morphology of the liver. No suspicious hepatic lesion. The gallbladder is unremarkable. No significant biliary ductal dilation. The spleen, pancreas, and bilateral adrenal glands show no acute abnormality. There is a small right superior pole renal cyst, unchanged. Bilateral renal  parenchymal scarring/thinning. No hydronephrosis or nephrolithiasis. No suspicious renal lesion. Small hiatal hernia. Thickening of the gastric wall which may be secondary to under distention. There is no evidence of bowel obstruction. Colonic diverticulosis without definite evidence of diverticulitis. Appendix is not well visualized and may be surgically absent or decompressed. There is no evidence of suspicious lymphadenopathy. There are shotty retroperitoneal and mesenteric lymph nodes which are unchanged from January 2024 PET/CT. There is moderate abdominopelvic ascites with autumn mesentery. No evidence of abdominal aortic aneurysm. Dense atherosclerotic calcifications of the aorta and branch vessels. The urinary bladder and prostate is difficult to evaluate given adjacent metallic artifact, however is grossly unremarkable. The abdominal and pelvic wall soft tissues show no acute abnormality. There is mild diffuse fatty muscle atrophy without discrete fluid collection. Bilateral hip arthroplasties. Cerclage fixation of the right intertrochanteric region. Chronic compression deformity of L1 vertebral body. No evidence of acute fracture or suspicious bony lesion.     Impression: Impression: CT HEAD: No acute intracranial abnormality. Chronic sequela of probable small vessel ischemic disease and severe global parenchymal volume loss. CT CHEST: No acute abnormality in the chest. Bilateral hypoventilatory changes with round atelectasis in the left lung base. Chronic adjacent small left pleural effusion with pleural wall thickening, not significantly changed from December 2023 chest CT.  Chronic ancillary findings as above. CT ABDOMEN/PELVIS: No acute abnormality in the abdomen or pelvis. Cirrhosis with moderate abdominopelvic ascites. Chronic ancillary findings as above. Electronically Signed: Kenroy Mcneil MD  4/27/2024 3:30 PM EDT  Workstation ID: [id number]    CT Head Without Contrast    Result Date: 4/27/2024  CT  HEAD WO CONTRAST, CT CHEST WO CONTRAST DIAGNOSTIC, CT ABDOMEN PELVIS WO CONTRAST Date of Exam: 4/27/2024 2:51 PM EDT Indication: AMS, confusion. Comparison: 12/4/2023 chest CT and 1/3/2024 PET/CT Technique: Axial CT images were obtained of the head, chest, abdomen, and pelvis without contrast administration.  Automated exposure control and iterative construction methods were used. Findings: CT HEAD: There is no evidence of acute intracranial hemorrhage, mass, midline shift, loss of gray-white matter differentiation, or extra-axial fluid collection. Subcortical periventricular white matter hypodensities are present consistent with small vessel ischemic disease. There is extensive global parenchymal volume loss with ex vacuo dilation of the ventricular system. The basal cisterns are patent. There is no fracture or suspicious osseous lesion. Mild mucosal thickening of the bilateral maxillary sinuses. Partial mastoid air cell effusions. Bilateral ocular surgery. Soft tissues are unremarkable. CT CHEST: The central airways are patent. There are hypoventilatory changes of the lung bases, left greater than right, with associated left lower lobe rounded atelectasis. This is unchanged from recent PET/CT. There is no new focal airspace consolidation. No new suspicious pulmonary nodule or mass. Unchanged small left pleural effusion, likely chronic given associated pleural wall thickening. Unchanged cardiomegaly. Dense atherosclerotic calcifications of the coronary arteries and aortic valve. There are also dense thoracic aortic calcifications. Trace pericardial effusion. No mediastinal mass or threshold lymphadenopathy. Chest wall soft tissues show no acute abnormality. Gynecomastia. No evidence of chest wall fracture or suspicious osseous lesion. CT ABDOMEN/PELVIS: Cirrhotic morphology of the liver. No suspicious hepatic lesion. The gallbladder is unremarkable. No significant biliary ductal dilation. The spleen, pancreas, and  bilateral adrenal glands show no acute abnormality. There is a small right superior pole renal cyst, unchanged. Bilateral renal parenchymal scarring/thinning. No hydronephrosis or nephrolithiasis. No suspicious renal lesion. Small hiatal hernia. Thickening of the gastric wall which may be secondary to under distention. There is no evidence of bowel obstruction. Colonic diverticulosis without definite evidence of diverticulitis. Appendix is not well visualized and may be surgically absent or decompressed. There is no evidence of suspicious lymphadenopathy. There are shotty retroperitoneal and mesenteric lymph nodes which are unchanged from January 2024 PET/CT. There is moderate abdominopelvic ascites with autumn mesentery. No evidence of abdominal aortic aneurysm. Dense atherosclerotic calcifications of the aorta and branch vessels. The urinary bladder and prostate is difficult to evaluate given adjacent metallic artifact, however is grossly unremarkable. The abdominal and pelvic wall soft tissues show no acute abnormality. There is mild diffuse fatty muscle atrophy without discrete fluid collection. Bilateral hip arthroplasties. Cerclage fixation of the right intertrochanteric region. Chronic compression deformity of L1 vertebral body. No evidence of acute fracture or suspicious bony lesion.     Impression: Impression: CT HEAD: No acute intracranial abnormality. Chronic sequela of probable small vessel ischemic disease and severe global parenchymal volume loss. CT CHEST: No acute abnormality in the chest. Bilateral hypoventilatory changes with round atelectasis in the left lung base. Chronic adjacent small left pleural effusion with pleural wall thickening, not significantly changed from December 2023 chest CT.  Chronic ancillary findings as above. CT ABDOMEN/PELVIS: No acute abnormality in the abdomen or pelvis. Cirrhosis with moderate abdominopelvic ascites. Chronic ancillary findings as above. Electronically  Signed: Kenroy Mcneil MD  4/27/2024 3:30 PM EDT  Workstation ID: [id number]    XR Chest 1 View    Result Date: 4/27/2024  XR CHEST 1 VW Date of Exam: 4/27/2024 12:50 PM EDT Indication: Weak/Dizzy/AMS triage protocol Comparison: PET/CT January 3, 2024, chest CT December 4, 2023, chest radiographs November 15, 2023. Findings: Left mid and lower lung pleural and parenchymal opacities appear similar to prior exams. There is a suspected small left pleural effusion component. No right lung infiltrate or pleural effusion. No significant pneumothorax component is identified. Heart size appears within normal limits. There is atherosclerosis of the aorta.     Impression: Impression: 1.Left mid and lower lung pleural and parenchymal opacities appear grossly similar to recent prior studies. 2.No definite radiographic findings of acute cardiopulmonary abnormality. Electronically Signed: Samir Sanchez  4/27/2024 1:24 PM EDT  Workstation ID: [id number]         Current medications:  Scheduled Meds:albumin human, 50 g, Intravenous, BID  amLODIPine, 5 mg, Oral, Daily  apixaban, 2.5 mg, Oral, BID  aspirin, 81 mg, Oral, Daily  atorvastatin, 80 mg, Oral, Daily  insulin lispro, 2-7 Units, Subcutaneous, 4x Daily AC & at Bedtime  lactulose, 10 g, Oral, Daily  levothyroxine, 137 mcg, Oral, QAM  memantine, 5 mg, Oral, BID  midodrine, 5 mg, Oral, TID AC  multivitamin with minerals, 1 tablet, Oral, Daily  pantoprazole, 40 mg, Oral, Q AM  piperacillin-tazobactam, 3.375 g, Intravenous, Q8H  rifAXIMin, 550 mg, Oral, Q12H  senna-docusate sodium, 2 tablet, Oral, BID  sodium chloride, 10 mL, Intravenous, Q12H  vancomycin (dosing per levels), , Does not apply, Daily  vancomycin, 750 mg, Intravenous, Once      Continuous Infusions:Pharmacy to dose vancomycin,       PRN Meds:.  senna-docusate sodium **AND** polyethylene glycol **AND** bisacodyl **AND** bisacodyl    Calcium Replacement - Follow Nurse / BPA Driven Protocol    dextrose    dextrose    glucagon  (human recombinant)    haloperidol lactate    Magnesium Standard Dose Replacement - Follow Nurse / BPA Driven Protocol    Morphine **AND** naloxone    nitroglycerin    ondansetron    Pharmacy to dose vancomycin    Phosphorus Replacement - Follow Nurse / BPA Driven Protocol    Potassium Replacement - Follow Nurse / BPA Driven Protocol    QUEtiapine    sodium chloride    sodium chloride    sodium chloride    Assessment & Plan   Assessment & Plan     Active Hospital Problems    Diagnosis  POA    **Metabolic encephalopathy [G93.41]  Yes    Chronic anticoagulation [Z79.01]  Not Applicable    PVD (peripheral vascular disease) [I73.9]  Yes    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        Brief Hospital Course to date:  Metabolic encephalopathy    Type 2 diabetes mellitus without complication, without long-term current use of insulin    Primary hypertension    Atrial flutter with controlled response    Chronic anticoagulation    PVD (peripheral vascular disease)    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 2 diabetes mellitus    Cirrhosis of liver    Hx-TIA (transient ischemic attack)    CHF (no ECHO on file currently)    Elevated serum creatinine    CKD (chronic kidney disease) stage 3, GFR 30-59 ml/min    Hypomagnesemia     Metabolic encephalopathy  - improved  - ammonia = normal   - no overt evidence of infectious  source but has elevated CRP and WBC's  - was on outpt PO Doxy for left heel & toe diabetic ulcer but currently does not appear infected  - continue empiric Zosyn and Vanc started in ED however low threshold to convert back to his PO Doxy after WOC evals wounds and documents  - hx of Aflutter, MRI brain ordered to rule out embolic dz given vague confusion presentation to rule out embolic phenomena however family declined MRI as they are contemplating on hospice care 04/29  -blood cx pending however NGTD. MRSA screen negative   -Addendum: 1/2 blood cx positive. Pt did receive Zosyn and Vanc. Appreciate pharmacy with assistance on dosing      CHICO on CKD Stage III, concern for hepatorenal syndrome   - moderate ascites  - s/p 25g albumin IV with 1L LR over 10 hours on admission   - appreciated nephrology recommendations     Aortic Stenosis  -declined for TAVR by [facility] PARKER     Cirrhosis  - has needed outpt paracentesis more often  - currently abd soft without dyspnea, moderate ascites noted on imaging  - follows with [facility]  - family interested in standing outpt paracentesis orders to decrease difficulty keeping patient hepatically homeostatic    - at last GI appt there was discussion about palliative tenkhoff  - courtesy consult to GI to help set up outpt needs an avert readmission     Diabetic left foot wound 1st toe and heel   - noninfected appearing  - continue dry gauze wraps  - [facility]     NIDDM2  - LDSSI     PVD  Hx of TIAs  HTN  HL  Aflutter on chronic anticoagulation  -continue Eliquis  - not on rate medications per home meds  - continue ASA 81mg and Lipitor 80mg      Senile cognitive impairment  At risk for hospital delirium  - takes Namenda at home   - delirium mitigation pathway enacted      [facility]: Please see conversation completed by Dr. Freeman on 04/27/2024. I also spoke with both son and wife at the bedside today. I updated family on labs and plan of care for today. Patient is appropriate for hospice level of  care and family is interested in speaking with palliative care today for further discuss overall care plan. We did discuss wounds will continue to occur due to poor mobility and family expressed understanding.     Total time spent: 40 minutes with greater than 50% of the time in counseling with family and coordination of care with sub specialists and nursing staff.     Expected Discharge Location and Transportation: TBD  Expected Discharge TBD  Expected discharge date/ time has not been documented.     DVT prophylaxis:  Medical DVT prophylaxis orders are present.         AM-PAC 6 Clicks Score (PT): 12 (04/28/24 1149)    CODE STATUS:   Code Status and Medical Interventions:   Ordered at: 04/27/24 0362     Medical Intervention Limits:    NO intubation (DNI)    NO cardioversion    NO antiarrhythmic drugs    NO dialysis    NO artificial nutrition     Code Status (Patient has no pulse and is not breathing):    No CPR (Do Not Attempt to Resuscitate)     Medical Interventions (Patient has pulse or is breathing):    Limited Support       Lola Rodrigues MD  04/28/24

## 2024-04-29 ENCOUNTER — TELEPHONE (OUTPATIENT)
Dept: FAMILY MEDICINE CLINIC | Facility: CLINIC | Age: 89
End: 2024-04-29
Payer: MEDICARE

## 2024-04-29 LAB
ANION GAP SERPL CALCULATED.3IONS-SCNC: 16 MMOL/L (ref 5–15)
BUN SERPL-MCNC: 51 MG/DL (ref 8–23)
BUN/CREAT SERPL: 21.3 (ref 7–25)
CALCIUM SPEC-SCNC: 8.6 MG/DL (ref 8.2–9.6)
CHLORIDE SERPL-SCNC: 102 MMOL/L (ref 98–107)
CO2 SERPL-SCNC: 21 MMOL/L (ref 22–29)
CREAT SERPL-MCNC: 2.39 MG/DL (ref 0.76–1.27)
DEPRECATED RDW RBC AUTO: 51.9 FL (ref 37–54)
EGFRCR SERPLBLD CKD-EPI 2021: 25 ML/MIN/1.73
ERYTHROCYTE [DISTWIDTH] IN BLOOD BY AUTOMATED COUNT: 16.3 % (ref 12.3–15.4)
GLUCOSE BLDC GLUCOMTR-MCNC: 144 MG/DL (ref 70–130)
GLUCOSE BLDC GLUCOMTR-MCNC: 176 MG/DL (ref 70–130)
GLUCOSE SERPL-MCNC: 137 MG/DL (ref 65–99)
HCT VFR BLD AUTO: 27.5 % (ref 37.5–51)
HGB BLD-MCNC: 9.1 G/DL (ref 13–17.7)
MAGNESIUM SERPL-MCNC: 2 MG/DL (ref 1.7–2.3)
MCH RBC QN AUTO: 28.9 PG (ref 26.6–33)
MCHC RBC AUTO-ENTMCNC: 33.1 G/DL (ref 31.5–35.7)
MCV RBC AUTO: 87.3 FL (ref 79–97)
PLATELET # BLD AUTO: 321 10*3/MM3 (ref 140–450)
PMV BLD AUTO: 10.1 FL (ref 6–12)
POTASSIUM SERPL-SCNC: 4.4 MMOL/L (ref 3.5–5.2)
RBC # BLD AUTO: 3.15 10*6/MM3 (ref 4.14–5.8)
SODIUM SERPL-SCNC: 139 MMOL/L (ref 136–145)
VANCOMYCIN SERPL-MCNC: 14.4 MCG/ML (ref 5–40)
WBC NRBC COR # BLD AUTO: 13.8 10*3/MM3 (ref 3.4–10.8)

## 2024-04-29 PROCEDURE — 85027 COMPLETE CBC AUTOMATED: CPT | Performed by: FAMILY MEDICINE

## 2024-04-29 PROCEDURE — 25010000002 PIPERACILLIN SOD-TAZOBACTAM PER 1 G: Performed by: FAMILY MEDICINE

## 2024-04-29 PROCEDURE — 80048 BASIC METABOLIC PNL TOTAL CA: CPT | Performed by: FAMILY MEDICINE

## 2024-04-29 PROCEDURE — 99223 1ST HOSP IP/OBS HIGH 75: CPT | Performed by: PHYSICIAN ASSISTANT

## 2024-04-29 PROCEDURE — 25010000002 ALBUMIN HUMAN 25% PER 50 ML: Performed by: INTERNAL MEDICINE

## 2024-04-29 PROCEDURE — 80202 ASSAY OF VANCOMYCIN: CPT

## 2024-04-29 PROCEDURE — P9047 ALBUMIN (HUMAN), 25%, 50ML: HCPCS | Performed by: INTERNAL MEDICINE

## 2024-04-29 PROCEDURE — 25810000003 SODIUM CHLORIDE 0.9 % SOLUTION 250 ML FLEX CONT

## 2024-04-29 PROCEDURE — 83735 ASSAY OF MAGNESIUM: CPT | Performed by: FAMILY MEDICINE

## 2024-04-29 PROCEDURE — 82948 REAGENT STRIP/BLOOD GLUCOSE: CPT

## 2024-04-29 PROCEDURE — 25010000002 VANCOMYCIN 1 G RECONSTITUTED SOLUTION 1 EACH VIAL

## 2024-04-29 PROCEDURE — 51703 INSERT BLADDER CATH COMPLEX: CPT | Performed by: PHYSICIAN ASSISTANT

## 2024-04-29 RX ORDER — ASPIRIN 81 MG/1
81 TABLET, CHEWABLE ORAL DAILY
Status: DISCONTINUED | OUTPATIENT
Start: 2024-04-29 | End: 2024-05-01 | Stop reason: HOSPADM

## 2024-04-29 RX ORDER — LEVOFLOXACIN 750 MG/1
750 TABLET, FILM COATED ORAL EVERY OTHER DAY
Status: CANCELLED | OUTPATIENT
Start: 2024-04-30 | End: 2024-05-04

## 2024-04-29 RX ORDER — PANTOPRAZOLE SODIUM 40 MG/10ML
40 INJECTION, POWDER, LYOPHILIZED, FOR SOLUTION INTRAVENOUS
Status: DISCONTINUED | OUTPATIENT
Start: 2024-04-29 | End: 2024-05-01 | Stop reason: HOSPADM

## 2024-04-29 RX ADMIN — Medication 10 ML: at 08:32

## 2024-04-29 RX ADMIN — RIFAXIMIN 550 MG: 550 TABLET ORAL at 21:20

## 2024-04-29 RX ADMIN — MEMANTINE 5 MG: 10 TABLET ORAL at 08:31

## 2024-04-29 RX ADMIN — APIXABAN 2.5 MG: 2.5 TABLET, FILM COATED ORAL at 08:31

## 2024-04-29 RX ADMIN — PANTOPRAZOLE SODIUM 40 MG: 40 TABLET, DELAYED RELEASE ORAL at 05:08

## 2024-04-29 RX ADMIN — MEMANTINE 5 MG: 10 TABLET ORAL at 21:20

## 2024-04-29 RX ADMIN — VANCOMYCIN HYDROCHLORIDE 1000 MG: 1 INJECTION, POWDER, LYOPHILIZED, FOR SOLUTION INTRAVENOUS at 13:11

## 2024-04-29 RX ADMIN — PIPERACILLIN AND TAZOBACTAM 3.38 G: 3; .375 INJECTION, POWDER, LYOPHILIZED, FOR SOLUTION INTRAVENOUS at 15:00

## 2024-04-29 RX ADMIN — ATORVASTATIN CALCIUM 80 MG: 40 TABLET, FILM COATED ORAL at 08:31

## 2024-04-29 RX ADMIN — PIPERACILLIN AND TAZOBACTAM 3.38 G: 3; .375 INJECTION, POWDER, LYOPHILIZED, FOR SOLUTION INTRAVENOUS at 21:20

## 2024-04-29 RX ADMIN — SENNOSIDES AND DOCUSATE SODIUM 2 TABLET: 8.6; 5 TABLET ORAL at 08:31

## 2024-04-29 RX ADMIN — APIXABAN 2.5 MG: 2.5 TABLET, FILM COATED ORAL at 21:20

## 2024-04-29 RX ADMIN — Medication 1 TABLET: at 08:31

## 2024-04-29 RX ADMIN — MIDODRINE HYDROCHLORIDE 5 MG: 5 TABLET ORAL at 08:31

## 2024-04-29 RX ADMIN — MIDODRINE HYDROCHLORIDE 5 MG: 5 TABLET ORAL at 13:12

## 2024-04-29 RX ADMIN — ALBUMIN (HUMAN) 50 G: 0.25 INJECTION, SOLUTION INTRAVENOUS at 08:39

## 2024-04-29 RX ADMIN — MIDODRINE HYDROCHLORIDE 5 MG: 5 TABLET ORAL at 17:58

## 2024-04-29 RX ADMIN — ASPIRIN 81 MG CHEWABLE TABLET 81 MG: 81 TABLET CHEWABLE at 08:39

## 2024-04-29 RX ADMIN — AMLODIPINE BESYLATE 5 MG: 5 TABLET ORAL at 08:31

## 2024-04-29 RX ADMIN — Medication 10 ML: at 21:22

## 2024-04-29 RX ADMIN — RIFAXIMIN 550 MG: 550 TABLET ORAL at 08:31

## 2024-04-29 RX ADMIN — PIPERACILLIN AND TAZOBACTAM 3.38 G: 3; .375 INJECTION, POWDER, LYOPHILIZED, FOR SOLUTION INTRAVENOUS at 05:07

## 2024-04-29 RX ADMIN — LACTULOSE 10 G: 20 SOLUTION ORAL at 08:31

## 2024-04-29 RX ADMIN — LEVOTHYROXINE SODIUM 137 MCG: 137 TABLET ORAL at 05:10

## 2024-04-29 NOTE — CONSULTS
Continued Stay Note  [facility]     Patient Name: Onel Clark  MRN: [id number]  Today's Date: 4/29/2024    Admit Date: 4/27/2024    Plan: Home with [facility]   Discharge Plan       Row Name 04/29/24 1212       Plan    Plan Home with [facility]    Plan Comments Hospice referral received, chart reviewed. Visit made to pt, pt's wife, son and daughter-in-law present and requested to meet outside pt's room. Family aware of the referral. Reviewed pt's current condition, family stated pt is a DNR and family wants to focus on comfort measures, does not want to take pt to any further doctor appointments or to the hospital. Family has used hospice services in the past. Reviewed hospice services-dicussed team visits, equipment, medications, supplies. Family stated does want to take pt home with hospice. During the visit Dr. Rodrigues arrived, discussed placement of drain for pt's ascites, Dr. Rodrigues messaged IR regarding drain placement, time set for drain to be placed tomorrow at 0900. Dr. Rodrigues stated pt may discharge home tomorrow afternoon. Discussed equipment needs for the home, pt will need hospital bed, TITI mattress, overbed table and oxygen, which hospice will deliver tomorrow morning. Request sent to [facility] transport for ambulance to transfer home tomorrow. Teaching done on the EMS/DNR form, pt's son Jose Juan SANTIAGO, signed the EMS/DNR form, which was placed on pt's chartlet. Will continue to follow. Please call [phone] if can be of further assistance                   Discharge Codes    No documentation.                 Expected Discharge Date and Time       Expected Discharge Date Expected Discharge Time    Apr 30, 2024               Ana Cristina Mar RN

## 2024-04-29 NOTE — PROGRESS NOTES
Pharmacy Consult-Vancomycin Dosing  Onel Clark is a  89 y.o. male receiving vancomycin therapy.     Indication:SSTI  Consulting Provider: hospitalist   ID Consult: No    Goal Trough: 10-15    Current Antimicrobial Therapy  Anti-Infectives (From admission, onward)      Ordered     Dose/Rate Route Frequency Start Stop    04/29/24 1002  vancomycin (VANCOCIN) 1,000 mg in sodium chloride 0.9 % 250 mL IVPB-VTB        Ordering Provider: Jered Buck RPH    1,000 mg  250 mL/hr over 60 Minutes Intravenous Once 04/29/24 1100      04/28/24 1337  vancomycin 750 mg in sodium chloride 0.9 % 250 mL IVPB-VTB        Ordering Provider: Naheed Bose RPH    750 mg  333.3 mL/hr over 45 Minutes Intravenous Once 04/28/24 1700 04/28/24 1741    04/28/24 1223  riFAXIMin (XIFAXAN) tablet 550 mg        Ordering Provider: Guilherme Ta APRN    550 mg Oral Every 12 Hours Scheduled 04/28/24 1315 05/28/24 0859    04/27/24 2210  vancomycin (dosing per levels)        Ordering Provider: Jose R Garcia, PharmD     Does not apply Daily 04/28/24 0900 05/05/24 0859    04/27/24 2204  piperacillin-tazobactam (ZOSYN) 3.375 g IVPB in 100 mL NS MBP (CD)        Ordering Provider: Rajwinder Freeman MD    3.375 g  over 4 Hours Intravenous Every 8 Hours Scheduled 04/27/24 2215 04/30/24 2159    04/27/24 2204  Pharmacy to dose vancomycin        Ordering Provider: Rajwinder Freeman MD     Does not apply Continuous PRN 04/27/24 2204 04/30/24 2203    04/27/24 1350  vancomycin IVPB 2000 mg in 0.9% Sodium Chloride 500 mL        Ordering Provider: Juan Arciniega DO    22 mg/kg × 90.3 kg  250 mL/hr over 120 Minutes Intravenous Once 04/27/24 1430 04/27/24 1829    04/27/24 1350  piperacillin-tazobactam (ZOSYN) 3.375 g IVPB in 100 mL NS MBP (CD)        Ordering Provider: Juan Arciniega DO    3.375 g  over 30 Minutes Intravenous Once 04/27/24 1400 04/27/24 1627            Allergies  Allergies as of 04/27/2024    (No Known Allergies)       Labs    Results from  "last 7 days   Lab Units 04/29/24  0720 04/28/24  0440 04/27/24  1755   BUN mg/dL 51* 49* 51*   CREATININE mg/dL 2.39* 2.18* 2.09*       Results from last 7 days   Lab Units 04/29/24  0720 04/28/24  0906 04/27/24  1257   WBC 10*3/mm3 13.80* 15.00* 15.76*       Evaluation of Dosing     Last Dose Received in the ED/Outside Facility: 2000mg  Is Patient on Dialysis or Renal Replacement: no    Ht - 182.9 cm (72\")  Wt - 90.3 kg (199 lb)    Estimated Creatinine Clearance: 25.7 mL/min (A) (by C-G formula based on SCr of 2.39 mg/dL (H)).    Intake & Output (last 3 days)         04/25 0701 04/26 0700 04/26 0701  04/27 0700 04/27 0701 04/28 0700    P.O.   118    IV Piggyback   100    Total Intake(mL/kg)   218 (2.4)    Net   +218                   Microbiology and Radiology  Microbiology Results (last 10 days)       Procedure Component Value - Date/Time    Blood Culture - Blood, Arm, Right [[id number]]  (Normal) Collected: 04/27/24 1540    Lab Status: Preliminary result Specimen: Blood from Arm, Right Updated: 04/28/24 1700     Blood Culture No growth at 24 hours    MRSA Screen, PCR (Inpatient) - Swab, Nares [[id number]]  (Normal) Collected: 04/27/24 1401    Lab Status: Final result Specimen: Swab from Nares Updated: 04/27/24 1522     MRSA PCR Negative    Narrative:      The negative predictive value of this diagnostic test is high and should only be used to consider de-escalating anti-MRSA therapy. A positive result may indicate colonization with MRSA and must be correlated clinically.  MRSA Negative    Blood Culture - Blood, Arm, Left [[id number]]  (Abnormal) Collected: 04/27/24 1359    Lab Status: Preliminary result Specimen: Blood from Arm, Left Updated: 04/29/24 0807     Blood Culture Abnormal Stain     Gram Stain Aerobic Bottle Gram positive cocci in pairs and clusters      Anaerobic Bottle Gram positive cocci in pairs and clusters    Narrative:      Less than seven (7) mL's of blood was collected.  Insufficient quantity " may yield false negative results.    Blood Culture ID, PCR - Blood, Arm, Left [[id number]]  (Abnormal) Collected: 04/27/24 1359    Lab Status: Final result Specimen: Blood from Arm, Left Updated: 04/28/24 1837     BCID, PCR Staph spp, not aureus or lugdunensis. Identification by BCID2 PCR.     BOTTLE TYPE Aerobic Bottle            Vancomycin Levels:    Results from last 7 days   Lab Units 04/29/24  0720 04/28/24  0440   VANCOMYCIN RM mcg/mL 14.40 13.80                     Assessment/Plan:    Pharmacy consulted to dose vancomycin for SSTI.  Patient was loaded on vancomycin 2000mg IV x 1 (~20mg/kg) and given 750mg on 4/28 at 1656. Random level today is 14.4 mg/L.  Due to renal function will give 1 gram today and recheck level in the morning.  Continue to monitor renal function, cultures and sensitivities and clinical status and adjust regimen as necessary. MRSA PCR negative; WBC 15.  Pharmacy will continue to follow.      Jered Buck RPH  4/29/2024  10:48 EDT

## 2024-04-29 NOTE — CASE MANAGEMENT/SOCIAL WORK
Discharge Planning Assessment  [facility]     Patient Name: Onel Clark  MRN: [id number]  Today's Date: 4/29/2024    Admit Date: 4/27/2024    Plan: Home with [facility]   Discharge Needs Assessment       Row Name 04/29/24 1337       Living Environment    People in Home spouse    Name(s) of People in Home Leni Clark, spouse [phone]    Current Living Arrangements home    Potentially Unsafe Housing Conditions unable to assess    In the past 12 months has the electric, gas, oil, or water company threatened to shut off services in your home? No    Primary Care Provided by spouse/significant other;child(claudio)    Provides Primary Care For no one, unable/limited ability to care for self    Family Caregiver if Needed child(claudio), adult    Family Caregiver Names Jose Juan Clark, [phone]    Quality of Family Relationships involved;supportive    Able to Return to Prior Arrangements yes       Resource/Environmental Concerns    Resource/Environmental Concerns none    Transportation Concerns none       Transition Planning    Patient/Family Anticipates Transition to home with family    Patient/Family Anticipated Services at Transition hospice care    Transportation Anticipated public transportation       Discharge Needs Assessment    Readmission Within the Last 30 Days no previous admission in last 30 days    Equipment Currently Used at Home shower chair;grab bar;wheelchair;rollator;bp cuff    Concerns to be Addressed discharge planning    Anticipated Changes Related to Illness inability to care for self    Equipment Needed After Discharge wheelchair, manual;grab bar, tub/shower;bp cuff;rollator;shower chair    Discharge Facility/Level of Care Needs other (see comments)  home with hospice    Current Discharge Risk chronically ill;terminally ill;dependent with mobility/activities of daily living    Discharge Coordination/Progress Met with spouse and son/POA, in room, to discuss discharge  plan. Pt is admitted with metabolic encephalopathy. Pt lives with spouse in [facility]. There are no steps in the home. He is dependent with ADLs and has the following DME: shower chair, grab bars, manual wheelchair, rollator and BP cuff. His PCP is Dr Ilir Fair. He has Medicare and [employer] insurance which has Rx coverage. [facility]. Per M[place], he is current with [facility] for SN and also has HH aide. He does not use home O2. He will be discharging home with hospice tomorrow, 4/29 @ 1615 via EMS. CM will continue to follow.                   Discharge Plan       Row Name 04/29/24 1212       Plan    Plan Home with [facility]    Plan Comments Hospice referral received, chart reviewed. Visit made to pt, pt's wife, son and daughter-in-law present and requested to meet outside pt's room. Family aware of the referral. Reviewed pt's current condition, family stated pt is a DNR and family wants to focus on comfort measures, does not want to take pt to any further doctor appointments or to the hospital. Family has used hospice services in the past. Reviewed hospice services-dicussed team visits, equipment, medications, supplies. Family stated does want to take pt home with hospice. During the visit Dr. Rodrigues arrived, discussed placement of drain for pt's ascites, Dr. Rodrigues messaged IR regarding drain placement, time set for drain to be placed tomorrow at 0900. Dr. Rodrigues stated pt may discharge home tomorrow afternoon. Discussed equipment needs for the home, pt will need hospital bed, TITI mattress, overbed table and oxygen, which hospice will deliver tomorow morning. Request sent to [facility] transport for ambulance to transfer home tomorrow. Teaching done on the EMS/DNR form, pt's son Jose Juan SANTIAGO, signed the EMS/DNR form, which was placed on pt's chartlet. Will continue to follow. Please call [phone] if can be of further assistance                  Continued Care and  Services - Admitted Since 4/27/2024       Destination       Service Provider Request Status Selected Services Address Phone Fax Patient Preferred    [facility] - [facility] Sent N[facility]A [address] [facility] [id number] [phone] --              Home Medical Care Coordination complete.      Service Provider Request Status Selected Services Address Phone Fax Patient Preferred    [facility]  [facility] [address] [facility] [id number] [phone] --                  Expected Discharge Date and Time       Expected Discharge Date Expected Discharge Time    Apr 30, 2024            Demographic Summary       Row Name 04/29/24 1336       General Information    Arrived From home    Reason for Consult discharge planning    Preferred Language English    General Information Comments PCP Dr Ilir Fair       Contact Information    Permission Granted to Share Info With     Contact Information Obtained for     Contact Information Comments Jos eJuan Clark, [phone], Leni Clark, spouse [phone]                   Functional Status       Row Name 04/29/24 1337       Functional Status    Usual Activity Tolerance fair    Current Activity Tolerance poor       Functional Status, IADL    Medications assistive person    Meal Preparation assistive person    Housekeeping assistive person    Laundry assistive person    Shopping assistive person                   Psychosocial    No documentation.                  Abuse/Neglect    No documentation.                  Legal    No documentation.                  Substance Abuse    No documentation.                  Patient Forms    No documentation.                     Irma Ho RN

## 2024-04-29 NOTE — DISCHARGE PLACEMENT REQUEST
"Onel Domingo (89 y.o. Male)   Referring: Dr. Rajwinder Freeman  PCP: Dr. Ilir Fair  Hospice Dx: Cirrhosis/COPD/CHF/CKD 3      Date of Birth   03/25/1933    Social Security Number       Address   [address] [facility] [address]    Home Phone   [phone]    MRN   [id number]       Hinduism   Presbyterian    Marital Status                               Admission Date   4/27/24    Admission Type   Emergency    Admitting Provider   Lola Rodrigues MD    Attending Provider   Lola Rodrigues MD    Department, Room/Bed   [facility] 6A, N619/1       Discharge Date       Discharge Disposition       Discharge Destination                                 Attending Provider: Lola Rodrigues MD    Allergies: No Known Allergies    Isolation: None   Infection: None   Code Status: No CPR    Ht: 182.9 cm (72\")   Wt: 90.3 kg (199 lb)    Admission Cmt: None   Principal Problem: Metabolic encephalopathy [G93.41]                   Active Insurance as of 4/27/2024       Primary Coverage       Payor Plan Insurance Group Employer/Plan Group    MEDICARE MEDICARE A & B        Payor Plan Address Payor Plan Phone Number Payor Plan Fax Number Effective Dates    [address] [phone]  3/1/1998 - None Entered    [facility] [address]         Subscriber Name Subscriber Birth Date Member ID       ONEL DOMINGO 3/25/1933 [id number]               Secondary Coverage       Payor Plan Insurance Group Employer/Plan Group    [employer]CIAL GEHA - ASA [id number]       Payor Plan Address Payor Plan Phone Number Payor Plan Fax Number Effective Dates    [address]   5/20/2022 - None Entered    [place] [address]         Subscriber Name Subscriber Birth Date Member ID       ONEL DOMINGO 3/25/1933 [id number]                     Emergency Contacts        (Rel.) Home Phone Work Phone Mobile Phone    NGUYEN DOMINGO (POA) (Son) [phone] -- [phone]    J LUIS DOMINGO " (Spouse) [phone] -- [phone]              Emergency Contact Information       Name Relation Home Work Mobile    NGUYEN DOMINGO (POA) Son [phone]    J LUIS DOMINGO Spouse [phone]          Insurance Information                  MEDICARE/MEDICARE A & B Phone: [phone]    Subscriber: Eduardo Onel Jarocho Subscriber#: [id number]    Group#: -- Precert#: --        [employer] COMMERCIAL/GEHA - ASA Phone: --    Subscriber: Onel Domingo Subscriber#: [id number]    Group#: [id number] Precert#: --          Problem List             Codes Noted - Resolved       Hospital    * (Principal) Metabolic encephalopathy ICD-10-CM: G93.41  ICD-9-CM: 348.31 4/27/2024 - Present    Chronic anticoagulation ICD-10-CM: Z79.01  ICD-9-CM: V58.61 4/27/2024 - Present    PVD (peripheral vascular disease) ICD-10-CM: I73.9  ICD-9-CM: 443.9 4/27/2024 - Present    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 2 diabetes mellitus ICD-10-CM: E11.621, L97.429  ICD-9-CM: 250.80, 707.14 4/27/2024 - Present    Cirrhosis of liver ICD-10-CM: K74.60  ICD-9-CM: 571.5 4/27/2024 - Present    Hx-TIA (transient ischemic attack) ICD-10-CM: Z86.73  ICD-9-CM: V12.54 4/27/2024 - Present    CHF (no ECHO on file currently) ICD-10-CM: I50.9  ICD-9-CM: 428.0 4/27/2024 - Present    Elevated serum creatinine ICD-10-CM: R79.89  ICD-9-CM: 790.99 4/27/2024 - Present    CKD (chronic kidney disease) stage 3, GFR 30-59 ml/min ICD-10-CM: N18.30  ICD-9-CM: 585.3 4/27/2024 - Present    Hypomagnesemia ICD-10-CM: E83.42  ICD-9-CM: 275.2 4/27/2024 - Present    Atrial flutter with controlled response ICD-10-CM: I48.92  ICD-9-CM: 427.32 1/24/2024 - Present    Type 2 diabetes mellitus without complication, without long-term current use of insulin ICD-10-CM: E11.9  ICD-9-CM: 250.00 4/13/2022 - Present    Primary hypertension ICD-10-CM: I10  ICD-9-CM: 401.9 4/13/2022 - Present       Non-Hospital    SOB (shortness of breath) ICD-10-CM:  R06.02  ICD-9-CM: 786.05 2024 - Present    Aortic stenosis, severe ICD-10-CM: I35.0  ICD-9-CM: 424.1 2024 - Present    Right leg pain ICD-10-CM: M79.604  ICD-9-CM: 729.5 2019 - Present    Degenerative lumbar spinal stenosis ICD-10-CM: M48.061  ICD-9-CM: 724.02 2019 - Present    Hereditary and idiopathic peripheral neuropathy ICD-10-CM: G60.9  ICD-9-CM: 356.9 2019 - Present    History of lumbar laminectomy ICD-10-CM: Z98.890  ICD-9-CM: V45.89 2019 - Present        History & Physical        Rajwinder Freeman MD at 24 1704              [facility] Medicine Services  HISTORY AND PHYSICAL    Patient Name: Onel Clark  : 3/25/1933  MRN: [id number]  Primary Care Physician: Ilir Fair MD  Date of admission: 2024      Subjective   Subjective     Chief Complaint:  Confusion     HPI:  Onel Clark is a 89 y.o. male with past medical history of cirrhosis getting periodic outpatient paracentesis, diabetes mellitus type 2, history of TIA, atrial flutter on chronic anticoagulation, PVD, CKD 3, and baseline chronic debility related to his advanced age.  At baseline patient lives at home with his wife, his son Jose Juan lives nearby and comes over for several hours every day to help with patient's care.  He remains ambulatory but with assistance and only small distances.    Brought to [facility] ED for confusion and restlessness which was unusual for patient.  Starting last evening patient was more restless and had minimal sleep through the night, he woke this morning and wanted to sit to the side of the bed but had overall general weakness and was able able to get up.  Patient's son Jose Juan had to come over to assist with patient's mobility and patient was found to be confused from his normal baseline.  He has a known history of cirrhosis and requires paracentesis, family became concerned that he possibly had increasing ascites or  hyperammonemia.  Patient remembers the events and denies shortness of breath, chest pain, sense of doom, diaphoresis, nausea, focal deficits.     In the emergency department evaluation was overall unremarkable.  Patient had stable vitals, no evidence of UTI or pneumonia, normal ammonia.  The most significant findings were hypomagnesemia, elevated CRP and white count of 15 with a left shift however no overt signs of infection.  Patient's mental status has returned to baseline and he is alert, oriented, and conversant.          Personal History     Past Medical History:   Diagnosis Date    Acquired hypothyroidism     Allergic     Allergies     Arthritis     Benign prostatic hyperplasia with nocturia     Bilateral carotid artery disease     Cancer of the skin, basal cell     Cataract     CHF (congestive heart failure)     Chronic non-seasonal allergic rhinitis     DUE TO POLLEN    Chronic renal impairment     Cirrhosis of liver 04/27/2024    CKD (chronic kidney disease) stage 3, GFR 30-59 ml/min 04/27/2024    COPD (chronic obstructive pulmonary disease)     Coronary artery disease     Decreased hearing of both ears     Degenerative lumbar spinal stenosis     Diabetes mellitus     Elevated PSA     Frequent falls     GERD without esophagitis     Heart murmur     High risk medication use     HL (hearing loss)     Hx of bilateral hip replacements     Hx of decompressive lumbar laminectomy     Hx of total knee replacement, bilateral     Hx of transient ischemic attack (TIA)     Hypertension     Mixed hyperlipidemia due to type 2 diabetes mellitus     Nicotine dependence     No natural teeth     FALSE TEETH    Osteoarthritis     Primary hypertension     Primary osteoarthritis involving multiple joints     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 2 diabetes mellitus     PVD (peripheral vascular disease) 04/27/2024    Stroke TIA  2 years ago    Thyroid disease     TIA (transient ischemic attack)     Type 2 diabetes mellitus with diabetic  polyneuropathy     Type 2 diabetes mellitus with stage 2 chronic kidney disease, without long-term current use of insulin            Past Surgical History:   Procedure Laterality Date    BACK SURGERY  2007    Lumbar Laminectomy    CARPAL TUNNEL RELEASE  2012    COLONOSCOPY      EYE SURGERY  cataracts    JOINT REPLACEMENT  both hips & knee & back    REPLACEMENT TOTAL KNEE Left 2013    SPINE SURGERY  about 15 yrs ago    TOTAL HIP ARTHROPLASTY Right 2016       Family History: family history includes Alzheimer's disease in his mother; Arthritis in his brother and sister; Coronary artery disease in his father; Diabetes in his brother, mother, and sister; Hearing loss in his brother and father; Heart attack in his father; Hyperlipidemia in his father, mother, and sister; Hypertension in his sister; Stroke in his father.     Social History:  reports that he quit smoking about 24 years ago. His smoking use included cigarettes. He started smoking about 34 years ago. He has a 10 pack-year smoking history. He has been exposed to tobacco smoke. He has never used smokeless tobacco. He reports current alcohol use of about 1.0 standard drink of alcohol per week. He reports that he does not use drugs.  Social History     Social History Narrative    Lives in [place], [place] at home with spouse        Medications:  Available home medication information reviewed.  Aspirin, Calcium Carb-Cholecalciferol, Cetirizine HCl, Magnesium, amLODIPine, apixaban, atorvastatin, bumetanide, levothyroxine, losartan, meloxicam, memantine, metFORMIN, multivitamin with minerals, omeprazole, and vitamin C    No Known Allergies    Objective   Objective     Vital Signs:   Temp:  [97.6 °F (36.4 °C)-98.8 °F (37.1 °C)] 97.6 °F (36.4 °C)  Heart Rate:  [63-65] 64  Resp:  [16] 16  BP: (111-132)/(57-66) 114/57       Physical Exam   Constitutional: No acute distress, awake, alert, nontoxic, normal body habitus  Eyes: Pupils equal, sclerae anicteric, no  conjunctival injection  HENT: NCAT, mucous membranes moist  Respiratory: Clear to auscultation bilaterally with dull bases, good effort, nonlabored respirations   Cardiovascular: RRR, 2/6 murmur  Gastrointestinal: Positive bowel sounds, soft, nontender, nondistended, no ascitic wave  Musculoskeletal: No peripheral edema, normal muscle tone for age  Psychiatric: Appropriate affect, good insight and judgement, cooperative  Neurologic: Oriented x 4, movements symmetric BUE and BLE, Cranial Nerves grossly intact, speech clear and fluent  Skin: Left first toe and left heel dry noninfected diabetic ulcers covered with Betadine and dry gauze.      LAB RESULTS:      Lab 04/27/24  1538 04/27/24  1257   WBC  --  15.76*   HEMOGLOBIN  --  10.5*   HEMATOCRIT  --  32.0*   PLATELETS  --  352   NEUTROS ABS  --  12.54*   IMMATURE GRANS (ABS)  --  0.05   LYMPHS ABS  --  1.88   MONOS ABS  --  1.24*   EOS ABS  --  0.00   MCV  --  87.9   CRP 19.16*  --    PROCALCITONIN  --  1.52*   LACTATE  --  1.8         Lab 04/27/24  1755 04/27/24  1538 04/27/24  1257   SODIUM 139  --  138   POTASSIUM 4.6  --  4.8   CHLORIDE 103  --  102   CO2 23.0  --  24.0   ANION GAP 13.0  --  12.0   BUN 51*  --  51*   CREATININE 2.09*  --  2.25*   EGFR 29.3*  --  26.9*   GLUCOSE 124*  --  134*   CALCIUM 8.6  --  8.8   MAGNESIUM 1.5*  --  1.5*   TSH  --  2.610  --          Lab 04/27/24  1257   TOTAL PROTEIN 6.0   ALBUMIN 3.0*   GLOBULIN 3.0   ALT (SGPT) 9   AST (SGOT) 18   BILIRUBIN 1.0   ALK PHOS 222*         Lab 04/27/24  1538 04/27/24  1257   HSTROP T 134* 134*                 UA          1/3/2024    11:00 4/27/2024    13:35   Urinalysis   Specific [place], UA  1.018    Ketones, UA Trace  Negative    Blood, UA  Negative    Leukocytes, UA Negative  Negative    Nitrite, UA  Negative        Microbiology Results (last 10 days)       Procedure Component Value - Date/Time    MRSA Screen, PCR (Inpatient) - Swab, Nares [[id number]]  (Normal) Collected: 04/27/24 1404     Lab Status: Final result Specimen: Swab from Nares Updated: 04/27/24 1522     MRSA PCR Negative    Narrative:      The negative predictive value of this diagnostic test is high and should only be used to consider de-escalating anti-MRSA therapy. A positive result may indicate colonization with MRSA and must be correlated clinically.  MRSA Negative            CT Chest Without Contrast Diagnostic    Result Date: 4/27/2024  CT HEAD WO CONTRAST, CT CHEST WO CONTRAST DIAGNOSTIC, CT ABDOMEN PELVIS WO CONTRAST Date of Exam: 4/27/2024 2:51 PM EDT Indication: AMS, confusion. Comparison: 12/4/2023 chest CT and 1/3/2024 PET/CT Technique: Axial CT images were obtained of the head, chest, abdomen, and pelvis without contrast administration.  Automated exposure control and iterative construction methods were used. Findings: CT HEAD: There is no evidence of acute intracranial hemorrhage, mass, midline shift, loss of gray-white matter differentiation, or extra-axial fluid collection. Subcortical periventricular white matter hypodensities are present consistent with small vessel ischemic disease. There is extensive global parenchymal volume loss with ex vacuo dilation of the ventricular system. The basal cisterns are patent. There is no fracture or suspicious osseous lesion. Mild mucosal thickening of the bilateral maxillary sinuses. Partial mastoid air cell effusions. Bilateral ocular surgery. Soft tissues are unremarkable. CT CHEST: The central airways are patent. There are hypoventilatory changes of the lung bases, left greater than right, with associated left lower lobe rounded atelectasis. This is unchanged from recent PET/CT. There is no new focal airspace consolidation. No new suspicious pulmonary nodule or mass. Unchanged small left pleural effusion, likely chronic given associated pleural wall thickening. Unchanged cardiomegaly. Dense atherosclerotic calcifications of the coronary arteries and aortic valve. There are also  dense thoracic aortic calcifications. Trace pericardial effusion. No mediastinal mass or threshold lymphadenopathy. Chest wall soft tissues show no acute abnormality. Gynecomastia. No evidence of chest wall fracture or suspicious osseous lesion. CT ABDOMEN/PELVIS: Cirrhotic morphology of the liver. No suspicious hepatic lesion. The gallbladder is unremarkable. No significant biliary ductal dilation. The spleen, pancreas, and bilateral adrenal glands show no acute abnormality. There is a small right superior pole renal cyst, unchanged. Bilateral renal parenchymal scarring/thinning. No hydronephrosis or nephrolithiasis. No suspicious renal lesion. Small hiatal hernia. Thickening of the gastric wall which may be secondary to under distention. There is no evidence of bowel obstruction. Colonic diverticulosis without definite evidence of diverticulitis. Appendix is not well visualized and may be surgically absent or decompressed. There is no evidence of suspicious lymphadenopathy. There are shotty retroperitoneal and mesenteric lymph nodes which are unchanged from January 2024 PET/CT. There is moderate abdominopelvic ascites with autumn mesentery. No evidence of abdominal aortic aneurysm. Dense atherosclerotic calcifications of the aorta and branch vessels. The urinary bladder and prostate is difficult to evaluate given adjacent metallic artifact, however is grossly unremarkable. The abdominal and pelvic wall soft tissues show no acute abnormality. There is mild diffuse fatty muscle atrophy without discrete fluid collection. Bilateral hip arthroplasties. Cerclage fixation of the right intertrochanteric region. Chronic compression deformity of L1 vertebral body. No evidence of acute fracture or suspicious bony lesion.     Impression: Impression: CT HEAD: No acute intracranial abnormality. Chronic sequela of probable small vessel ischemic disease and severe global parenchymal volume loss. CT CHEST: No acute abnormality in  the chest. Bilateral hypoventilatory changes with round atelectasis in the left lung base. Chronic adjacent small left pleural effusion with pleural wall thickening, not significantly changed from December 2023 chest CT.  Chronic ancillary findings as above. CT ABDOMEN/PELVIS: No acute abnormality in the abdomen or pelvis. Cirrhosis with moderate abdominopelvic ascites. Chronic ancillary findings as above. Electronically Signed: Kenroy Mcneil MD  4/27/2024 3:30 PM EDT  Workstation ID: [id number]    CT Abdomen Pelvis Without Contrast    Result Date: 4/27/2024  CT HEAD WO CONTRAST, CT CHEST WO CONTRAST DIAGNOSTIC, CT ABDOMEN PELVIS WO CONTRAST Date of Exam: 4/27/2024 2:51 PM EDT Indication: AMS, confusion. Comparison: 12/4/2023 chest CT and 1/3/2024 PET/CT Technique: Axial CT images were obtained of the head, chest, abdomen, and pelvis without contrast administration.  Automated exposure control and iterative construction methods were used. Findings: CT HEAD: There is no evidence of acute intracranial hemorrhage, mass, midline shift, loss of gray-white matter differentiation, or extra-axial fluid collection. Subcortical periventricular white matter hypodensities are present consistent with small vessel ischemic disease. There is extensive global parenchymal volume loss with ex vacuo dilation of the ventricular system. The basal cisterns are patent. There is no fracture or suspicious osseous lesion. Mild mucosal thickening of the bilateral maxillary sinuses. Partial mastoid air cell effusions. Bilateral ocular surgery. Soft tissues are unremarkable. CT CHEST: The central airways are patent. There are hypoventilatory changes of the lung bases, left greater than right, with associated left lower lobe rounded atelectasis. This is unchanged from recent PET/CT. There is no new focal airspace consolidation. No new suspicious pulmonary nodule or mass. Unchanged small left pleural effusion, likely chronic given associated pleural  wall thickening. Unchanged cardiomegaly. Dense atherosclerotic calcifications of the coronary arteries and aortic valve. There are also dense thoracic aortic calcifications. Trace pericardial effusion. No mediastinal mass or threshold lymphadenopathy. Chest wall soft tissues show no acute abnormality. Gynecomastia. No evidence of chest wall fracture or suspicious osseous lesion. CT ABDOMEN/PELVIS: Cirrhotic morphology of the liver. No suspicious hepatic lesion. The gallbladder is unremarkable. No significant biliary ductal dilation. The spleen, pancreas, and bilateral adrenal glands show no acute abnormality. There is a small right superior pole renal cyst, unchanged. Bilateral renal parenchymal scarring/thinning. No hydronephrosis or nephrolithiasis. No suspicious renal lesion. Small hiatal hernia. Thickening of the gastric wall which may be secondary to under distention. There is no evidence of bowel obstruction. Colonic diverticulosis without definite evidence of diverticulitis. Appendix is not well visualized and may be surgically absent or decompressed. There is no evidence of suspicious lymphadenopathy. There are shotty retroperitoneal and mesenteric lymph nodes which are unchanged from January 2024 PET/CT. There is moderate abdominopelvic ascites with autumn mesentery. No evidence of abdominal aortic aneurysm. Dense atherosclerotic calcifications of the aorta and branch vessels. The urinary bladder and prostate is difficult to evaluate given adjacent metallic artifact, however is grossly unremarkable. The abdominal and pelvic wall soft tissues show no acute abnormality. There is mild diffuse fatty muscle atrophy without discrete fluid collection. Bilateral hip arthroplasties. Cerclage fixation of the right intertrochanteric region. Chronic compression deformity of L1 vertebral body. No evidence of acute fracture or suspicious bony lesion.     Impression: Impression: CT HEAD: No acute intracranial abnormality.  Chronic sequela of probable small vessel ischemic disease and severe global parenchymal volume loss. CT CHEST: No acute abnormality in the chest. Bilateral hypoventilatory changes with round atelectasis in the left lung base. Chronic adjacent small left pleural effusion with pleural wall thickening, not significantly changed from December 2023 chest CT.  Chronic ancillary findings as above. CT ABDOMEN/PELVIS: No acute abnormality in the abdomen or pelvis. Cirrhosis with moderate abdominopelvic ascites. Chronic ancillary findings as above. Electronically Signed: Kenroy Mcneil MD  4/27/2024 3:30 PM EDT  Workstation ID: [id number]    CT Head Without Contrast    Result Date: 4/27/2024  CT HEAD WO CONTRAST, CT CHEST WO CONTRAST DIAGNOSTIC, CT ABDOMEN PELVIS WO CONTRAST Date of Exam: 4/27/2024 2:51 PM EDT Indication: AMS, confusion. Comparison: 12/4/2023 chest CT and 1/3/2024 PET/CT Technique: Axial CT images were obtained of the head, chest, abdomen, and pelvis without contrast administration.  Automated exposure control and iterative construction methods were used. Findings: CT HEAD: There is no evidence of acute intracranial hemorrhage, mass, midline shift, loss of gray-white matter differentiation, or extra-axial fluid collection. Subcortical periventricular white matter hypodensities are present consistent with small vessel ischemic disease. There is extensive global parenchymal volume loss with ex vacuo dilation of the ventricular system. The basal cisterns are patent. There is no fracture or suspicious osseous lesion. Mild mucosal thickening of the bilateral maxillary sinuses. Partial mastoid air cell effusions. Bilateral ocular surgery. Soft tissues are unremarkable. CT CHEST: The central airways are patent. There are hypoventilatory changes of the lung bases, left greater than right, with associated left lower lobe rounded atelectasis. This is unchanged from recent PET/CT. There is no new focal airspace  consolidation. No new suspicious pulmonary nodule or mass. Unchanged small left pleural effusion, likely chronic given associated pleural wall thickening. Unchanged cardiomegaly. Dense atherosclerotic calcifications of the coronary arteries and aortic valve. There are also dense thoracic aortic calcifications. Trace pericardial effusion. No mediastinal mass or threshold lymphadenopathy. Chest wall soft tissues show no acute abnormality. Gynecomastia. No evidence of chest wall fracture or suspicious osseous lesion. CT ABDOMEN/PELVIS: Cirrhotic morphology of the liver. No suspicious hepatic lesion. The gallbladder is unremarkable. No significant biliary ductal dilation. The spleen, pancreas, and bilateral adrenal glands show no acute abnormality. There is a small right superior pole renal cyst, unchanged. Bilateral renal parenchymal scarring/thinning. No hydronephrosis or nephrolithiasis. No suspicious renal lesion. Small hiatal hernia. Thickening of the gastric wall which may be secondary to under distention. There is no evidence of bowel obstruction. Colonic diverticulosis without definite evidence of diverticulitis. Appendix is not well visualized and may be surgically absent or decompressed. There is no evidence of suspicious lymphadenopathy. There are shotty retroperitoneal and mesenteric lymph nodes which are unchanged from January 2024 PET/CT. There is moderate abdominopelvic ascites with autumn mesentery. No evidence of abdominal aortic aneurysm. Dense atherosclerotic calcifications of the aorta and branch vessels. The urinary bladder and prostate is difficult to evaluate given adjacent metallic artifact, however is grossly unremarkable. The abdominal and pelvic wall soft tissues show no acute abnormality. There is mild diffuse fatty muscle atrophy without discrete fluid collection. Bilateral hip arthroplasties. Cerclage fixation of the right intertrochanteric region. Chronic compression deformity of L1  vertebral body. No evidence of acute fracture or suspicious bony lesion.     Impression: Impression: CT HEAD: No acute intracranial abnormality. Chronic sequela of probable small vessel ischemic disease and severe global parenchymal volume loss. CT CHEST: No acute abnormality in the chest. Bilateral hypoventilatory changes with round atelectasis in the left lung base. Chronic adjacent small left pleural effusion with pleural wall thickening, not significantly changed from December 2023 chest CT.  Chronic ancillary findings as above. CT ABDOMEN/PELVIS: No acute abnormality in the abdomen or pelvis. Cirrhosis with moderate abdominopelvic ascites. Chronic ancillary findings as above. Electronically Signed: Kenroy Mcneil MD  4/27/2024 3:30 PM EDT  Workstation ID: [id number]    XR Chest 1 View    Result Date: 4/27/2024  XR CHEST 1 VW Date of Exam: 4/27/2024 12:50 PM EDT Indication: Weak/Dizzy/AMS triage protocol Comparison: PET/CT January 3, 2024, chest CT December 4, 2023, chest radiographs November 15, 2023. Findings: Left mid and lower lung pleural and parenchymal opacities appear similar to prior exams. There is a suspected small left pleural effusion component. No right lung infiltrate or pleural effusion. No significant pneumothorax component is identified. Heart size appears within normal limits. There is atherosclerosis of the aorta.     Impression: Impression: 1.Left mid and lower lung pleural and parenchymal opacities appear grossly similar to recent prior studies. 2.No definite radiographic findings of acute cardiopulmonary abnormality. Electronically Signed: Samir Sanchez  4/27/2024 1:24 PM EDT  Workstation ID: [id number]         Assessment & Plan   Assessment & Plan       Metabolic encephalopathy    Type 2 diabetes mellitus without complication, without long-term current use of insulin    Primary hypertension    Atrial flutter with controlled response    Chronic anticoagulation    PVD (peripheral vascular  disease)    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 2 diabetes mellitus    Cirrhosis of liver    Hx-TIA (transient ischemic attack)    CHF (no ECHO on file currently)    Elevated serum creatinine    CKD (chronic kidney disease) stage 3, GFR 30-59 ml/min    Hypomagnesemia    Metabolic encephalopathy  - resolved, patient very conversant and oriented currently  - ammonia = normal   - no overt evidence of infectious source but has elevated CRP and WBC's  - was on outpt PO Doxy for left heel & toe diabetic ulcer but currently does not appear infected  - continue empiric Zosyn and Vanc started in ED however low threshold to convert back to his PO Doxy after WOC evals wounds and documents  - hx of Aflutter, MRI brain ordered to rule out embolic dz given vague confusion presentation to rule out embolic phenomena    Elevated creatinine  CKD III  - moderate ascities  - hold home diuretics for now while give some oncotic support and get intravascularly replenished  - give 25g albumin IV with 1L LR over 10 hours  - reasses morning BMP    Cirrhosis  - has needed outpt paracentesis more often  - currently abd soft without dyspnea, moderate ascites noted on imaging  - follows with [facility]  - family interested in standing outpt paracentesis orders to decrease difficulty keeping patient hepatically homeostatic    - at last GI appt there was discussion about palliative tenkhoff  - courtesy consult to GI to help set up outpt needs an avert readmission   - GOC are comfort-based overall, agreeable to Hospice discussions    Diabetic left foot wound 1st toe and heel   - noninfected appearing  - continue dry gauze wraps  - WO    NIDDM2  - LDSSI    PVD  Hx of TIAs  HTN  HL  Aflutter on chronic anticoagulation  -continue Eliquis  - not on rate medications per home meds  - continue ASA 81mg and Lipitor 80mg     Senile cognitive impairment  At risk for hospital delirium  - takes Namenda at home   - delirium mitigation pathway  "enacted      DVT prophylaxis:  Medical DVT prophylaxis orders are present.      Discussed findings and plan of care with patient's son Jose Juan by phone at approximately 9:30 PM.  Jose Juan helps care for patient in home during daytime hours, patient lives with his wife still in their own home.  He has limited mobility but does walk from the bed to the living room and restroom with assistance.     Long discussion with patient, he has a sense of guilt for his son having to care for him daily.  Patient was stating \" I have lived good life and I think it is my time soon, and I do not have a problem with that\".  Patient was not receptive to the idea of SNF rehab, he feels he is \"not at that place in my life where I am going to get better anymore\".  Very realistic and comfortable with this conversation.    HOSPICE CONSULT IN PLACE TO HELP WITH LONG-TERM DISPO OPTIONS BASED ON HIS GOC AND AGE.    CODE STATUS:    Code Status and Medical Interventions:   Ordered at: 04/27/24 2143     Medical Intervention Limits:    NO intubation (DNI)    NO cardioversion    NO antiarrhythmic drugs    NO dialysis    NO artificial nutrition     Code Status (Patient has no pulse and is not breathing):    No CPR (Do Not Attempt to Resuscitate)     Medical Interventions (Patient has pulse or is breathing):    Limited Support       Expected Discharge   Expected discharge date/ time has not been documented.     Rajwinder Freeman MD  04/27/24      Electronically signed by Rajwinder Freeman MD at 04/27/24 9309       Current Facility-Administered Medications   Medication Dose Route Frequency Provider Last Rate Last Admin    acetaminophen (TYLENOL) tablet 500 mg  500 mg Oral Q6H PRN Lola Rodrigues MD   500 mg at 04/28/24 1553    amLODIPine (NORVASC) tablet 5 mg  5 mg Oral Daily Rajwinder Freeman MD   5 mg at 04/29/24 0831    apixaban (ELIQUIS) tablet 2.5 mg  2.5 mg Oral BID Rajwinder Freeman MD   2.5 mg at 04/29/24 0831    aspirin chewable tablet 81 mg  81 mg Oral Daily " Lola Rodrigues MD   81 mg at 04/29/24 0839    atorvastatin (LIPITOR) tablet 80 mg  80 mg Oral Daily Rajwinder Freeman MD   80 mg at 04/29/24 0831    sennosides-docusate (PERICOLACE) 8.6-50 MG per tablet 2 tablet  2 tablet Oral BID Rajwinder Freeman MD   2 tablet at 04/29/24 0831    And    polyethylene glycol (MIRALAX) packet 17 g  17 g Oral Daily PRN Rajwinder Freeman MD        And    bisacodyl (DULCOLAX) EC tablet 5 mg  5 mg Oral Daily PRN Rajwinder Freeman MD        And    bisacodyl (DULCOLAX) suppository 10 mg  10 mg Rectal Daily PRN Rajwinder Freeman MD        Calcium Replacement - Follow Nurse / BPA Driven Protocol   Does not apply PRN Rajwinder Freeman MD        dextrose (D50W) (25 g/50 mL) IV injection 25 g  25 g Intravenous Q15 Min PRN Rajwinder Freeman MD        dextrose (GLUTOSE) oral gel 15 g  15 g Oral Q15 Min PRN Rajwinder Freeman MD        glucagon (GLUCAGEN) injection 1 mg  1 mg Intramuscular Q15 Min PRN Rajwinder Freeman MD        haloperidol lactate (HALDOL) injection 2 mg  2 mg Intravenous Q4H PRN Rajwinder Freeman MD        lactulose (CHRONULAC) 10 GM/15ML solution 10 g  10 g Oral Daily Guilherme Ta APRN   10 g at 04/29/24 0831    levothyroxine (SYNTHROID, LEVOTHROID) tablet 137 mcg  137 mcg Oral QAM Rajwinder Freeman MD   137 mcg at 04/29/24 0510    Magnesium Standard Dose Replacement - Follow Nurse / BPA Driven Protocol   Does not apply PRN Rajwinder Freeman MD        memantine (NAMENDA) tablet 5 mg  5 mg Oral BID Rajwinder Freeman MD   5 mg at 04/29/24 0831    midodrine (PROAMATINE) tablet 5 mg  5 mg Oral TID AC Andres Logan MD   5 mg at 04/29/24 0831    morphine injection 1 mg  1 mg Intravenous Q4H PRN Rajwinder Freeman MD        And    naloxone (NARCAN) injection 0.4 mg  0.4 mg Intravenous Q5 Min PRN Rajwinder Freeman MD        multivitamin with minerals 1 tablet  1 tablet Oral Daily Rajwinder Freeman MD   1 tablet at 04/29/24 0831    nitroglycerin (NITROSTAT) SL tablet 0.4 mg  0.4 mg Sublingual Q5 Min PRN Rajwinder Freeman MD        ondansetron (ZOFRAN) injection  4 mg  4 mg Intravenous Q6H PRN Rajwinder Freeman MD        pantoprazole (PROTONIX) injection 40 mg  40 mg Intravenous Q AM Lola Rodrigues MD        Pharmacy to dose vancomycin   Does not apply Continuous PRN Rajwinder Freeman MD        Phosphorus Replacement - Follow Nurse / BPA Driven Protocol   Does not apply PRN Rajwinder Freeman MD        piperacillin-tazobactam (ZOSYN) 3.375 g IVPB in 100 mL NS MBP (CD)  3.375 g Intravenous Q8H Rajwinder Freeman MD   3.375 g at 24 0507    Potassium Replacement - Follow Nurse / BPA Driven Protocol   Does not apply PRN Rajiwnder Freeman MD        QUEtiapine (SEROquel) tablet 12.5 mg  12.5 mg Oral BID PRN Rajwinder Freeman MD        riFAXIMin (XIFAXAN) tablet 550 mg  550 mg Oral Q12H Guilherme Ta APRN   550 mg at 24 0831    sodium chloride 0.9 % flush 10 mL  10 mL Intravenous PRN Juan Arciniega DO        sodium chloride 0.9 % flush 10 mL  10 mL Intravenous Q12H Rajwinder Freeman MD   10 mL at 24 0832    sodium chloride 0.9 % flush 10 mL  10 mL Intravenous PRN Rajwinder Freeman MD        sodium chloride 0.9 % infusion 40 mL  40 mL Intravenous PRN Rajwinder Freeman MD        vancomycin (dosing per levels)   Does not apply Daily Jose R Garcia, PharmD        vancomycin (VANCOCIN) 1,000 mg in sodium chloride 0.9 % 250 mL IVPB-VTB  1,000 mg Intravenous Once Jered Buck [facility]            Physician Progress Notes (last 72 hours)        Lola Rodrigues MD at 24 0828              [facility] Medicine Services  PROGRESS NOTE    Patient Name: Onel Clark  : 3/25/1933  MRN: [id number]    Date of Admission: 2024  Primary Care Physician: Ilir Fair MD    Subjective   Subjective     CC:  Confusion    HPI:  Patient seen this morning with patient's son and wife at the bedside.  They report that his mentation has improved from yesterday.  They do report in the morning his confusion is worse at home.  He does require a lift chair at home and remains mostly  in chair.       Objective   Objective     Vital Signs:   Temp:  [97.6 °F (36.4 °C)-98.7 °F (37.1 °C)] 98 °F (36.7 °C)  Heart Rate:  [53-73] 64  Resp:  [16-18] 18  BP: (102-125)/(54-63) 102/59     Physical Exam:  Constitutional: No acute distress, awake, alert  HENT: NCAT, mucous membranes moist  Respiratory: Clear to auscultation bilaterally, respiratory effort normal   Cardiovascular: irregular rate, systolic murmur, rubs, or gallops  Gastrointestinal: Positive bowel sounds, soft, nontender, nondistended  Musculoskeletal: No bilateral ankle edema  Psychiatric: Appropriate affect, cooperative  Neurologic: Oriented x 2 self and time, does know city but not state ,speech clear, globally weak. Does have some confusion.   Skin: No rashes    Results Reviewed:  LAB RESULTS:      Lab 04/28/24  0906 04/27/24  1538 04/27/24  1257   WBC 15.00*  --  15.76*   HEMOGLOBIN 10.2*  --  10.5*   HEMATOCRIT 30.9*  --  32.0*   PLATELETS 335  --  352   NEUTROS ABS 12.38*  --  12.54*   IMMATURE GRANS (ABS) 0.06*  --  0.05   LYMPHS ABS 1.10  --  1.88   MONOS ABS 1.41*  --  1.24*   EOS ABS 0.00  --  0.00   MCV 87.5  --  87.9   CRP  --  19.16*  --    PROCALCITONIN  --   --  1.52*   LACTATE  --   --  1.8         Lab 04/28/24  0440 04/27/24  1755 04/27/24  1538 04/27/24  1257   SODIUM 139 139  --  138   POTASSIUM 4.2 4.6  --  4.8   CHLORIDE 103 103  --  102   CO2 24.0 23.0  --  24.0   ANION GAP 12.0 13.0  --  12.0   BUN 49* 51*  --  51*   CREATININE 2.18* 2.09*  --  2.25*   EGFR 27.9* 29.3*  --  26.9*   GLUCOSE 165* 124*  --  134*   CALCIUM 8.6 8.6  --  8.8   MAGNESIUM 2.5* 1.5*  --  1.5*   TSH  --   --  2.610  --          Lab 04/27/24  1257   TOTAL PROTEIN 6.0   ALBUMIN 3.0*   GLOBULIN 3.0   ALT (SGPT) 9   AST (SGOT) 18   BILIRUBIN 1.0   ALK PHOS 222*         Lab 04/27/24  1538 04/27/24  1257   HSTROP T 134* 134*                 Brief Urine Lab Results  (Last result in the past 365 days)        Color   Clarity   Blood   Leuk Est   Nitrite    "Protein   CREAT   Urine HCG        04/27/24 1335 Yellow   Clear   Negative   Negative   Negative   Trace                   Cultures:  No results found for: \"BLOODCX\", \"URINECX\", \"WOUNDCX\", \"MRSACX\", \"RESPCX\", \"STOOLCX\"    Microbiology Results Abnormal       Procedure Component Value - Date/Time    Blood Culture - Blood, Arm, Left [[id number]]  (Normal) Collected: 04/27/24 1359    Lab Status: Preliminary result Specimen: Blood from Arm, Left Updated: 04/28/24 1430     Blood Culture No growth at 24 hours    Narrative:      Less than seven (7) mL's of blood was collected.  Insufficient quantity may yield false negative results.    MRSA Screen, PCR (Inpatient) - Swab, Nares [[id number]]  (Normal) Collected: 04/27/24 1401    Lab Status: Final result Specimen: Swab from Nares Updated: 04/27/24 1522     MRSA PCR Negative    Narrative:      The negative predictive value of this diagnostic test is high and should only be used to consider de-escalating anti-MRSA therapy. A positive result may indicate colonization with MRSA and must be correlated clinically.  MRSA Negative            CT Chest Without Contrast Diagnostic    Result Date: 4/27/2024  CT HEAD WO CONTRAST, CT CHEST WO CONTRAST DIAGNOSTIC, CT ABDOMEN PELVIS WO CONTRAST Date of Exam: 4/27/2024 2:51 PM EDT Indication: AMS, confusion. Comparison: 12/4/2023 chest CT and 1/3/2024 PET/CT Technique: Axial CT images were obtained of the head, chest, abdomen, and pelvis without contrast administration.  Automated exposure control and iterative construction methods were used. Findings: CT HEAD: There is no evidence of acute intracranial hemorrhage, mass, midline shift, loss of gray-white matter differentiation, or extra-axial fluid collection. Subcortical periventricular white matter hypodensities are present consistent with small vessel ischemic disease. There is extensive global parenchymal volume loss with ex vacuo dilation of the ventricular system. The basal cisterns are " patent. There is no fracture or suspicious osseous lesion. Mild mucosal thickening of the bilateral maxillary sinuses. Partial mastoid air cell effusions. Bilateral ocular surgery. Soft tissues are unremarkable. CT CHEST: The central airways are patent. There are hypoventilatory changes of the lung bases, left greater than right, with associated left lower lobe rounded atelectasis. This is unchanged from recent PET/CT. There is no new focal airspace consolidation. No new suspicious pulmonary nodule or mass. Unchanged small left pleural effusion, likely chronic given associated pleural wall thickening. Unchanged cardiomegaly. Dense atherosclerotic calcifications of the coronary arteries and aortic valve. There are also dense thoracic aortic calcifications. Trace pericardial effusion. No mediastinal mass or threshold lymphadenopathy. Chest wall soft tissues show no acute abnormality. Gynecomastia. No evidence of chest wall fracture or suspicious osseous lesion. CT ABDOMEN/PELVIS: Cirrhotic morphology of the liver. No suspicious hepatic lesion. The gallbladder is unremarkable. No significant biliary ductal dilation. The spleen, pancreas, and bilateral adrenal glands show no acute abnormality. There is a small right superior pole renal cyst, unchanged. Bilateral renal parenchymal scarring/thinning. No hydronephrosis or nephrolithiasis. No suspicious renal lesion. Small hiatal hernia. Thickening of the gastric wall which may be secondary to under distention. There is no evidence of bowel obstruction. Colonic diverticulosis without definite evidence of diverticulitis. Appendix is not well visualized and may be surgically absent or decompressed. There is no evidence of suspicious lymphadenopathy. There are shotty retroperitoneal and mesenteric lymph nodes which are unchanged from January 2024 PET/CT. There is moderate abdominopelvic ascites with autumn mesentery. No evidence of abdominal aortic aneurysm. Dense  atherosclerotic calcifications of the aorta and branch vessels. The urinary bladder and prostate is difficult to evaluate given adjacent metallic artifact, however is grossly unremarkable. The abdominal and pelvic wall soft tissues show no acute abnormality. There is mild diffuse fatty muscle atrophy without discrete fluid collection. Bilateral hip arthroplasties. Cerclage fixation of the right intertrochanteric region. Chronic compression deformity of L1 vertebral body. No evidence of acute fracture or suspicious bony lesion.     Impression: Impression: CT HEAD: No acute intracranial abnormality. Chronic sequela of probable small vessel ischemic disease and severe global parenchymal volume loss. CT CHEST: No acute abnormality in the chest. Bilateral hypoventilatory changes with round atelectasis in the left lung base. Chronic adjacent small left pleural effusion with pleural wall thickening, not significantly changed from December 2023 chest CT.  Chronic ancillary findings as above. CT ABDOMEN/PELVIS: No acute abnormality in the abdomen or pelvis. Cirrhosis with moderate abdominopelvic ascites. Chronic ancillary findings as above. Electronically Signed: Kenroy Mcneil MD  4/27/2024 3:30 PM EDT  Workstation ID: [id number]    CT Abdomen Pelvis Without Contrast    Result Date: 4/27/2024  CT HEAD WO CONTRAST, CT CHEST WO CONTRAST DIAGNOSTIC, CT ABDOMEN PELVIS WO CONTRAST Date of Exam: 4/27/2024 2:51 PM EDT Indication: AMS, confusion. Comparison: 12/4/2023 chest CT and 1/3/2024 PET/CT Technique: Axial CT images were obtained of the head, chest, abdomen, and pelvis without contrast administration.  Automated exposure control and iterative construction methods were used. Findings: CT HEAD: There is no evidence of acute intracranial hemorrhage, mass, midline shift, loss of gray-white matter differentiation, or extra-axial fluid collection. Subcortical periventricular white matter hypodensities are present consistent with small  vessel ischemic disease. There is extensive global parenchymal volume loss with ex vacuo dilation of the ventricular system. The basal cisterns are patent. There is no fracture or suspicious osseous lesion. Mild mucosal thickening of the bilateral maxillary sinuses. Partial mastoid air cell effusions. Bilateral ocular surgery. Soft tissues are unremarkable. CT CHEST: The central airways are patent. There are hypoventilatory changes of the lung bases, left greater than right, with associated left lower lobe rounded atelectasis. This is unchanged from recent PET/CT. There is no new focal airspace consolidation. No new suspicious pulmonary nodule or mass. Unchanged small left pleural effusion, likely chronic given associated pleural wall thickening. Unchanged cardiomegaly. Dense atherosclerotic calcifications of the coronary arteries and aortic valve. There are also dense thoracic aortic calcifications. Trace pericardial effusion. No mediastinal mass or threshold lymphadenopathy. Chest wall soft tissues show no acute abnormality. Gynecomastia. No evidence of chest wall fracture or suspicious osseous lesion. CT ABDOMEN/PELVIS: Cirrhotic morphology of the liver. No suspicious hepatic lesion. The gallbladder is unremarkable. No significant biliary ductal dilation. The spleen, pancreas, and bilateral adrenal glands show no acute abnormality. There is a small right superior pole renal cyst, unchanged. Bilateral renal parenchymal scarring/thinning. No hydronephrosis or nephrolithiasis. No suspicious renal lesion. Small hiatal hernia. Thickening of the gastric wall which may be secondary to under distention. There is no evidence of bowel obstruction. Colonic diverticulosis without definite evidence of diverticulitis. Appendix is not well visualized and may be surgically absent or decompressed. There is no evidence of suspicious lymphadenopathy. There are shotty retroperitoneal and mesenteric lymph nodes which are unchanged  from January 2024 PET/CT. There is moderate abdominopelvic ascites with autumn mesentery. No evidence of abdominal aortic aneurysm. Dense atherosclerotic calcifications of the aorta and branch vessels. The urinary bladder and prostate is difficult to evaluate given adjacent metallic artifact, however is grossly unremarkable. The abdominal and pelvic wall soft tissues show no acute abnormality. There is mild diffuse fatty muscle atrophy without discrete fluid collection. Bilateral hip arthroplasties. Cerclage fixation of the right intertrochanteric region. Chronic compression deformity of L1 vertebral body. No evidence of acute fracture or suspicious bony lesion.     Impression: Impression: CT HEAD: No acute intracranial abnormality. Chronic sequela of probable small vessel ischemic disease and severe global parenchymal volume loss. CT CHEST: No acute abnormality in the chest. Bilateral hypoventilatory changes with round atelectasis in the left lung base. Chronic adjacent small left pleural effusion with pleural wall thickening, not significantly changed from December 2023 chest CT.  Chronic ancillary findings as above. CT ABDOMEN/PELVIS: No acute abnormality in the abdomen or pelvis. Cirrhosis with moderate abdominopelvic ascites. Chronic ancillary findings as above. Electronically Signed: Kenroy Mcneil MD  4/27/2024 3:30 PM EDT  Workstation ID: [id number]    CT Head Without Contrast    Result Date: 4/27/2024  CT HEAD WO CONTRAST, CT CHEST WO CONTRAST DIAGNOSTIC, CT ABDOMEN PELVIS WO CONTRAST Date of Exam: 4/27/2024 2:51 PM EDT Indication: AMS, confusion. Comparison: 12/4/2023 chest CT and 1/3/2024 PET/CT Technique: Axial CT images were obtained of the head, chest, abdomen, and pelvis without contrast administration.  Automated exposure control and iterative construction methods were used. Findings: CT HEAD: There is no evidence of acute intracranial hemorrhage, mass, midline shift, loss of gray-white matter  differentiation, or extra-axial fluid collection. Subcortical periventricular white matter hypodensities are present consistent with small vessel ischemic disease. There is extensive global parenchymal volume loss with ex vacuo dilation of the ventricular system. The basal cisterns are patent. There is no fracture or suspicious osseous lesion. Mild mucosal thickening of the bilateral maxillary sinuses. Partial mastoid air cell effusions. Bilateral ocular surgery. Soft tissues are unremarkable. CT CHEST: The central airways are patent. There are hypoventilatory changes of the lung bases, left greater than right, with associated left lower lobe rounded atelectasis. This is unchanged from recent PET/CT. There is no new focal airspace consolidation. No new suspicious pulmonary nodule or mass. Unchanged small left pleural effusion, likely chronic given associated pleural wall thickening. Unchanged cardiomegaly. Dense atherosclerotic calcifications of the coronary arteries and aortic valve. There are also dense thoracic aortic calcifications. Trace pericardial effusion. No mediastinal mass or threshold lymphadenopathy. Chest wall soft tissues show no acute abnormality. Gynecomastia. No evidence of chest wall fracture or suspicious osseous lesion. CT ABDOMEN/PELVIS: Cirrhotic morphology of the liver. No suspicious hepatic lesion. The gallbladder is unremarkable. No significant biliary ductal dilation. The spleen, pancreas, and bilateral adrenal glands show no acute abnormality. There is a small right superior pole renal cyst, unchanged. Bilateral renal parenchymal scarring/thinning. No hydronephrosis or nephrolithiasis. No suspicious renal lesion. Small hiatal hernia. Thickening of the gastric wall which may be secondary to under distention. There is no evidence of bowel obstruction. Colonic diverticulosis without definite evidence of diverticulitis. Appendix is not well visualized and may be surgically absent or  decompressed. There is no evidence of suspicious lymphadenopathy. There are shotty retroperitoneal and mesenteric lymph nodes which are unchanged from January 2024 PET/CT. There is moderate abdominopelvic ascites with autumn mesentery. No evidence of abdominal aortic aneurysm. Dense atherosclerotic calcifications of the aorta and branch vessels. The urinary bladder and prostate is difficult to evaluate given adjacent metallic artifact, however is grossly unremarkable. The abdominal and pelvic wall soft tissues show no acute abnormality. There is mild diffuse fatty muscle atrophy without discrete fluid collection. Bilateral hip arthroplasties. Cerclage fixation of the right intertrochanteric region. Chronic compression deformity of L1 vertebral body. No evidence of acute fracture or suspicious bony lesion.     Impression: Impression: CT HEAD: No acute intracranial abnormality. Chronic sequela of probable small vessel ischemic disease and severe global parenchymal volume loss. CT CHEST: No acute abnormality in the chest. Bilateral hypoventilatory changes with round atelectasis in the left lung base. Chronic adjacent small left pleural effusion with pleural wall thickening, not significantly changed from December 2023 chest CT.  Chronic ancillary findings as above. CT ABDOMEN/PELVIS: No acute abnormality in the abdomen or pelvis. Cirrhosis with moderate abdominopelvic ascites. Chronic ancillary findings as above. Electronically Signed: Kenroy Mnceil MD  4/27/2024 3:30 PM EDT  Workstation ID: [id number]    XR Chest 1 View    Result Date: 4/27/2024  XR CHEST 1 VW Date of Exam: 4/27/2024 12:50 PM EDT Indication: Weak/Dizzy/AMS triage protocol Comparison: PET/CT January 3, 2024, chest CT December 4, 2023, chest radiographs November 15, 2023. Findings: Left mid and lower lung pleural and parenchymal opacities appear similar to prior exams. There is a suspected small left pleural effusion component. No right lung infiltrate or  pleural effusion. No significant pneumothorax component is identified. Heart size appears within normal limits. There is atherosclerosis of the aorta.     Impression: Impression: 1.Left mid and lower lung pleural and parenchymal opacities appear grossly similar to recent prior studies. 2.No definite radiographic findings of acute cardiopulmonary abnormality. Electronically Signed: Samir Daniel  4/27/2024 1:24 PM EDT  Workstation ID: [id number]         Current medications:  Scheduled Meds:albumin human, 50 g, Intravenous, BID  amLODIPine, 5 mg, Oral, Daily  apixaban, 2.5 mg, Oral, BID  aspirin, 81 mg, Oral, Daily  atorvastatin, 80 mg, Oral, Daily  insulin lispro, 2-7 Units, Subcutaneous, 4x Daily AC & at Bedtime  lactulose, 10 g, Oral, Daily  levothyroxine, 137 mcg, Oral, QAM  memantine, 5 mg, Oral, BID  midodrine, 5 mg, Oral, TID AC  multivitamin with minerals, 1 tablet, Oral, Daily  pantoprazole, 40 mg, Oral, Q AM  piperacillin-tazobactam, 3.375 g, Intravenous, Q8H  rifAXIMin, 550 mg, Oral, Q12H  senna-docusate sodium, 2 tablet, Oral, BID  sodium chloride, 10 mL, Intravenous, Q12H  vancomycin (dosing per levels), , Does not apply, Daily  vancomycin, 750 mg, Intravenous, Once      Continuous Infusions:Pharmacy to dose vancomycin,       PRN Meds:.  senna-docusate sodium **AND** polyethylene glycol **AND** bisacodyl **AND** bisacodyl    Calcium Replacement - Follow Nurse / BPA Driven Protocol    dextrose    dextrose    glucagon (human recombinant)    haloperidol lactate    Magnesium Standard Dose Replacement - Follow Nurse / BPA Driven Protocol    Morphine **AND** naloxone    nitroglycerin    ondansetron    Pharmacy to dose vancomycin    Phosphorus Replacement - Follow Nurse / BPA Driven Protocol    Potassium Replacement - Follow Nurse / BPA Driven Protocol    QUEtiapine    sodium chloride    sodium chloride    sodium chloride    Assessment & Plan   Assessment & Plan     Active Hospital Problems    Diagnosis  POA     **Metabolic encephalopathy [G93.41]  Yes    Chronic anticoagulation [Z79.01]  Not Applicable    PVD (peripheral vascular disease) [I73.9]  Yes    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        Brief Hospital Course to date:  Metabolic encephalopathy    Type 2 diabetes mellitus without complication, without long-term current use of insulin    Primary hypertension    Atrial flutter with controlled response    Chronic anticoagulation    PVD (peripheral vascular disease)    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 2 diabetes mellitus    Cirrhosis of liver    Hx-TIA (transient ischemic attack)    CHF (no ECHO on file currently)    Elevated serum creatinine    CKD (chronic kidney disease) stage 3, GFR 30-59 ml/min    Hypomagnesemia     Metabolic encephalopathy  - improved  - ammonia = normal   - no overt evidence of infectious source but has elevated CRP and WBC's  - was on outpt PO Doxy for left heel & toe diabetic ulcer but currently does not appear infected  - continue empiric Zosyn and Vanc started in ED however low threshold to convert back to his PO Doxy after WOC evals wounds and documents  - hx of Aflutter, MRI brain ordered to rule out embolic dz given vague confusion presentation to rule out embolic phenomena however family declined MRI as they are contemplating on hospice care 04/29  -blood cx pending  however NGTD. MRSA screen negative   -Addendum: 1/2 blood cx positive. Pt did receive Zosyn and Vanc. Appreciate pharmacy with assistance on dosing      CHICO on CKD Stage III, concern for hepatorenal syndrome   - moderate ascites  - s/p 25g albumin IV with 1L LR over 10 hours on admission   - appreciated nephrology recommendations     Aortic Stenosis  -declined for TAVR by [facility] PARKER     Cirrhosis  - has needed outpt paracentesis more often  - currently abd soft without dyspnea, moderate ascites noted on imaging  - follows with [facility]  - family interested in standing outpt paracentesis orders to decrease difficulty keeping patient hepatically homeostatic    - at last GI appt there was discussion about palliative tenkhoff  - courtesy consult to GI to help set up outpt needs an avert readmission     Diabetic left foot wound 1st toe and heel   - noninfected appearing  - continue dry gauze wraps  - C     NIDDM2  - LDSSI     PVD  Hx of TIAs  HTN  HL  Aflutter on chronic anticoagulation  -continue Eliquis  - not on rate medications per home meds  - continue ASA 81mg and Lipitor 80mg      Senile cognitive impairment  At risk for hospital delirium  - takes Namenda at home   - delirium mitigation pathway enacted      [facility]: Please see conversation completed by Dr. Freeman on 04/27/2024. I also spoke with both son and wife at the bedside today. I updated family on labs and plan of care for today. Patient is appropriate for hospice level of care and family is interested in speaking with palliative care today for further discuss overall care plan. We did discuss wounds will continue to occur due to poor mobility and family expressed understanding.     Total time spent: 40 minutes with greater than 50% of the time in counseling with family and coordination of care with sub specialists and nursing staff.     Expected Discharge Location and Transportation: TBD  Expected Discharge TBD  Expected discharge date/ time has not been  documented.     DVT prophylaxis:  Medical DVT prophylaxis orders are present.         AM-PAC 6 Clicks Score (PT): 12 (24 1149)    CODE STATUS:   Code Status and Medical Interventions:   Ordered at: 24 2143     Medical Intervention Limits:    NO intubation (DNI)    NO cardioversion    NO antiarrhythmic drugs    NO dialysis    NO artificial nutrition     Code Status (Patient has no pulse and is not breathing):    No CPR (Do Not Attempt to Resuscitate)     Medical Interventions (Patient has pulse or is breathing):    Limited Support       Lola Rodrigues MD  24        Electronically signed by Lola Rodrigues MD at 24 1746          Consult Notes (last 72 hours)        Blaise Santiago PA-C at 24 1126        Consult Orders    1. Inpatient Urology Consult [[id number]] ordered by Lola Rodrigues MD at 24 1012              Attestation signed by Darrian Hull MD at 24 1216    I have reviewed this documentation and agree.    I am currently off campus at [facility] currently.      Discussed with PA and reviewed chart.  Multiple medical comorbidities in this 89-year-old advanced age patient now with urinary retention requiring Monroy catheter.  Recommend outpatient follow-up with PA team for voiding trial to discuss further evaluation and management of retention, I will likely recommend intermittent catheterization outpatient teaching if patient able to be performed.    Darrian Hull MD                   [facility]   HISTORY AND PHYSICAL    Patient Name: Onel Clark  : 3/25/1933  MRN: [id number]  Primary Care Physician:  Ilir Fair MD  Date of admission: 2024    Subjective   Subjective     Chief Complaint: Urinary retention/Difficult monroy placement     HPI:  I am seeing Onel Clark at the request of the medicine service for evaluation of urinary retention/difficult Monroy catheter placement.  He is a 89-year-old gentleman with multiple  medical issues including cirrhosis, getting periodic outpatient paracentesis, diabetes, history of TIA, atrial flutter on chronic anticoagulation, PVD, CKD 3, and baseline chronic debility related to advanced age.  Patient had presented to [facility] ED on 4/27/2024 with altered mental status and fatigue.  He was subsequently admitted to the medicine service for metabolic encephalopathy, and also found to have a diabetic left foot wound.  Per nursing, urine output has been monitored with condom catheter.  They have noticed a decreased urine output and elevated bladder scans, though unclear if these bladder scans were performed after paracentesis.  Do not find recording of bladder scan results in chart.  Nursing attempted to straight cath without success.  Urology was consulted for difficult catheter placement.    Patient appears to be a poor historian, no family is present.  History gleaned from chart and bedside nurse.  It appears patient has seen a urologist in [place] in the past for BPH.  Does not appear he is ever had any urologic procedures performed.  Unknown if he had prior issues with acute urinary retention requiring urgent catheterization.    Review of Systems   Review of systems could not be obtained due to   patient confusion.    Personal History     Past Medical History:   Diagnosis Date    Acquired hypothyroidism     Allergic     Allergies     Arthritis     Benign prostatic hyperplasia with nocturia     Bilateral carotid artery disease     Cancer of the skin, basal cell     Cataract     CHF (congestive heart failure)     Chronic non-seasonal allergic rhinitis     DUE TO POLLEN    Chronic renal impairment     Cirrhosis of liver 04/27/2024    CKD (chronic kidney disease) stage 3, GFR 30-59 ml/min 04/27/2024    COPD (chronic obstructive pulmonary disease)     Coronary artery disease     Decreased hearing of both ears     Degenerative lumbar spinal stenosis     Diabetes mellitus     Elevated PSA      Frequent falls     GERD without esophagitis     Heart murmur     High risk medication use     HL (hearing loss)     Hx of bilateral hip replacements     Hx of decompressive lumbar laminectomy     Hx of total knee replacement, bilateral     Hx of transient ischemic attack (TIA)     Hypertension     Mixed hyperlipidemia due to type 2 diabetes mellitus     Nicotine dependence     No natural teeth     FALSE TEETH    Osteoarthritis     Primary hypertension     Primary osteoarthritis involving multiple joints     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 2 diabetes mellitus     PVD (peripheral vascular disease) 04/27/2024    Stroke TIA  2 years ago    Thyroid disease     TIA (transient ischemic attack)     Type 2 diabetes mellitus with diabetic polyneuropathy     Type 2 diabetes mellitus with stage 2 chronic kidney disease, without long-term current use of insulin        Past Surgical History:   Procedure Laterality Date    BACK SURGERY  2007    Lumbar Laminectomy    CARPAL TUNNEL RELEASE  2012    COLONOSCOPY      EYE SURGERY  cataracts    JOINT REPLACEMENT  both hips & knee & back    REPLACEMENT TOTAL KNEE Left 2013    SPINE SURGERY  about 15 yrs ago    TOTAL HIP ARTHROPLASTY Right 2016       Family History: family history includes Alzheimer's disease in his mother; Arthritis in his brother and sister; Coronary artery disease in his father; Diabetes in his brother, mother, and sister; Hearing loss in his brother and father; Heart attack in his father; Hyperlipidemia in his father, mother, and sister; Hypertension in his sister; Stroke in his father. Otherwise pertinent FHx was reviewed and not pertinent to current issue.    Social History:  reports that he quit smoking about 24 years ago. His smoking use included cigarettes. He started smoking about 34 years ago. He has a 10 pack-year smoking history. He has been exposed to tobacco smoke. He has never used smokeless tobacco. He reports current alcohol use of about 1.0 standard drink of  alcohol per week. He reports that he does not use drugs.    Home Medications:  Aspirin, Calcium Carb-Cholecalciferol, Cetirizine HCl, Magnesium, amLODIPine, apixaban, atorvastatin, bumetanide, levothyroxine, losartan, meloxicam, memantine, metFORMIN, multivitamin with minerals, omeprazole, and vitamin C      Allergies:  No Known Allergies    Objective   Objective     Vitals:   Temp:  [97.7 °F (36.5 °C)-98 °F (36.7 °C)] 98 °F (36.7 °C)  Heart Rate:  [56-91] 77  Resp:  [18-20] 18  BP: (109-144)/(31-88) 114/63  Physical Exam    Constitutional: Awake in bed, frail-appearing   Eyes: PERRLA, sclerae anicteric, no conjunctival injection   HENT: Normocephalic, atraumatic, mucous membranes moist   Neck: Supple, trachea midline   Respiratory:Equal chest rise, non-labored respirations    Cardiovascular: RRR, palpable radial pulses bilaterally   Gastrointestinal: Soft, nontender, non-distended,    Musculoskeletal: No bilateral ankle edema, no clubbing or cyanosis to extremities   Genitourinary: Unircumcised phallus, orthotopic meatus, bilaterally descended testicles palpable without masses   Psychiatric: Appropriate affect, cooperative   Neurologic: Cranial Nerves grossly intact   Skin: No rashes     Result Review    Result Review:  I have personally reviewed the results from the time of this admission to 4/29/2024 11:27 EDT and agree with these findings:  [x]  Laboratory  [x]  Microbiology  []  Radiology  []  EKG/Telemetry   []  Cardiology/Vascular   []  Pathology  []  Old records  []  Other:    Most notable findings include:   Cr:  2.39  WBC: 13.80      Assessment & Plan   Assessment / Plan     Brief Patient Summary:  Onel Clark is a 89 y.o. male with multiple medical issues including cirrhosis, getting periodic outpatient paracentesis, diabetes, history of TIA, atrial flutter on chronic anticoagulation, PVD, CKD 3, and baseline chronic debility related to advanced age, currently admitted to [facility]  with concerns for metabolic encephalopathy.  Urology consulted for difficult catheter placement.    Genitourinary area was prepped and draped in the normal sterile fashion.  An 18 Albanian coudé catheter was smoothly inserted, though there was some resistance of the prostatic urethra.  It was immediate withdrawal of approximately 200 cc of clear yellow urine.  Balloon was inflated with 10 mL of sterile water.  Catheter was placed to gravity drainage.  Patient tolerated procedure well.  There were no immediate complications.    Active Hospital Problems:  Active Hospital Problems    Diagnosis     **Metabolic encephalopathy     Chronic anticoagulation     PVD (peripheral vascular disease)     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 2 diabetes mellitus     Cirrhosis of liver     Hx-TIA (transient ischemic attack)     CHF (no ECHO on file currently)     Elevated serum creatinine     CKD (chronic kidney disease) stage 3, GFR 30-59 ml/min     Hypomagnesemia     Atrial flutter with controlled response     Type 2 diabetes mellitus without complication, without long-term current use of insulin     Primary hypertension      Plan:   -Routine catheter care  -Voiding trial per primary team  -Recommend starting Flomax if okay with prior primary team  -Follow-up with his outpatient urologist upon discharge  -All other plans per primary and other consulting teams  -Will follow peripherally going forward.  Please page with questions or concerns.    DVT prophylaxis:  Medical DVT prophylaxis orders are present.      CODE STATUS:    Medical Intervention Limits: NO intubation (DNI); NO cardioversion; NO antiarrhythmic drugs; NO dialysis; NO artificial nutrition  Code Status (Patient has no pulse and is not breathing): No CPR (Do Not Attempt to Resuscitate)  Medical Interventions (Patient has pulse or is breathing): Limited Support    Dispo: Per primary team    Electronically signed by Blaise Santiago PA-C, 04/29/24, 11:27 AM  EDT.               Electronically signed by Darrian Hull MD at 04/29/24 1216       Andres Logan MD at 04/28/24 1244            Patient Care Team:  Ilir Fair MD as PCP - General (Family Medicine)  Luis Eduardo Echeverria MD as Consulting Physician (Orthopedic Surgery)    Chief complaint: AMS    History of Present Illness: Mr West is a 89 yo gentleman w past medical hx of liver cirrhosis, Aortic stenosis, mild CKD. He is admitted yesterday with worsening confusion. Patient has been getting paracentesis for recurrent ascites. Per the family patient underwent last paracentesis on 4/18/24 and 5 liter fluid was taken off. Patient has no prior hx of formal diagnosis for CKD. Review of labs suggest baseline cr ~ 1.2-1.3mg/dl. However his renal function has been gradually worsening. Cr trends in last 3-4 months 1.3>1.9>2.0>2.1 on recent testing. He rosalind any NSAID use. Per the family patient was taking combination of spironolactone and Bumex but spironolactone was stopped by cardiologist 1 month ago. Patient is currently sitting in chair rosalind any specific issues, No significant LE edema noted. Appetite is ok. Ammonia level on admission 11    Review of Systems   Constitutional: Negative.    HENT: Negative.     Respiratory: Negative.     Cardiovascular: Negative.    Gastrointestinal: Negative.    Genitourinary: Negative.    Musculoskeletal: Negative.    Psychiatric/Behavioral:  Positive for confusion.         Past Medical History:   Diagnosis Date    Acquired hypothyroidism     Allergic     Allergies     Arthritis     Benign prostatic hyperplasia with nocturia     Bilateral carotid artery disease     Cancer of the skin, basal cell     Cataract     CHF (congestive heart failure)     Chronic non-seasonal allergic rhinitis     DUE TO POLLEN    Chronic renal impairment     Cirrhosis of liver 04/27/2024    CKD (chronic kidney disease) stage 3, GFR 30-59 ml/min 04/27/2024    COPD (chronic obstructive pulmonary  disease)     Coronary artery disease     Decreased hearing of both ears     Degenerative lumbar spinal stenosis     Diabetes mellitus     Elevated PSA     Frequent falls     GERD without esophagitis     Heart murmur     High risk medication use     HL (hearing loss)     Hx of bilateral hip replacements     Hx of decompressive lumbar laminectomy     Hx of total knee replacement, bilateral     Hx of transient ischemic attack (TIA)     Hypertension     Mixed hyperlipidemia due to type 2 diabetes mellitus     Nicotine dependence     No natural teeth     FALSE TEETH    Osteoarthritis     Primary hypertension     Primary osteoarthritis involving multiple joints     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 2 diabetes mellitus     PVD (peripheral vascular disease) 04/27/2024    Stroke TIA  2 years ago    Thyroid disease     TIA (transient ischemic attack)     Type 2 diabetes mellitus with diabetic polyneuropathy     Type 2 diabetes mellitus with stage 2 chronic kidney disease, without long-term current use of insulin    ,   Past Surgical History:   Procedure Laterality Date    BACK SURGERY  2007    Lumbar Laminectomy    CARPAL TUNNEL RELEASE  2012    COLONOSCOPY      EYE SURGERY  cataracts    JOINT REPLACEMENT  both hips & knee & back    REPLACEMENT TOTAL KNEE Left 2013    SPINE SURGERY  about 15 yrs ago    TOTAL HIP ARTHROPLASTY Right 2016   ,   Family History   Problem Relation Age of Onset    Alzheimer's disease Mother     Diabetes Mother     Hyperlipidemia Mother     Heart attack Father     Stroke Father     Coronary artery disease Father     Hearing loss Father     Hyperlipidemia Father     Diabetes Sister     Hypertension Sister     Hyperlipidemia Sister     Arthritis Sister     Arthritis Brother     Diabetes Brother     Hearing loss Brother    ,   Social History     Socioeconomic History    Marital status:     Number of children: 1   Tobacco Use    Smoking status: Former     Current packs/day: 0.00     Average packs/day: 1  pack/day for 10.0 years (10.0 ttl pk-yrs)     Types: Cigarettes     Start date:      Quit date:      Years since quittin.3     Passive exposure: Past    Smokeless tobacco: Never    Tobacco comments:     None   Vaping Use    Vaping status: Never Used   Substance and Sexual Activity    Alcohol use: Yes     Alcohol/week: 1.0 standard drink of alcohol     Types: 1 Glasses of wine per week     Comment: None    Drug use: No    Sexual activity: Not Currently     Partners: Male     Comment: With  (male) until lately - old age problems     E-cigarette/Vaping    E-cigarette/Vaping Use Never User      E-cigarette/Vaping Substances     E-cigarette/Vaping Devices         ,   Medications Prior to Admission   Medication Sig Dispense Refill Last Dose    amLODIPine (NORVASC) 5 MG tablet TAKE 1 TABLET BY MOUTH EVERY DAY 90 tablet 0 2024    apixaban (ELIQUIS) 2.5 MG tablet tablet Take 1 tablet by mouth Daily. (Patient taking differently: Take 1 tablet by mouth Daily. BID) 180 tablet 3 2024    ASPIRIN 81 PO Take 81 mg by mouth Daily.   2024    atorvastatin (LIPITOR) 80 MG tablet TAKE 1 TABLET BY MOUTH AT BEDTIME 90 tablet 1 2024    bumetanide (BUMEX) 2 MG tablet Take 1 tablet by mouth Daily. 90 tablet 3 2024    CALCIUM CARB-CHOLECALCIFEROL PO Take  by mouth.   2024    Cetirizine HCl 10 MG capsule Take 10 mg by mouth Daily With Breakfast. Indications: Hayfever   2024    levothyroxine (SYNTHROID, LEVOTHROID) 137 MCG tablet TAKE 1 TABLET BY MOUTH EVERY MORNING 90 tablet 0 2024    losartan (COZAAR) 50 MG tablet TAKE 1 TABLET BY MOUTH EVERY DAY 90 tablet 0 2024    Magnesium 400 MG capsule Take 400 mg by mouth Daily.   2024    meloxicam (MOBIC) 7.5 MG tablet    2024    memantine (NAMENDA) 5 MG tablet TAKE 1 TABLET BY MOUTH 2 TIMES A  tablet 0 2024    metFORMIN (GLUCOPHAGE) 1000 MG tablet Take 1 tablet by mouth 2 (Two) Times a Day With Meals. 180 tablet 1  4/26/2024    multivitamin with minerals (MENS MULTI VITAMIN & MINERAL PO) Take 1 tablet by mouth Daily.   4/26/2024    omeprazole (priLOSEC) 40 MG capsule TAKE 1 CAPSULE BY MOUTH EVERY DAY 90 capsule 0 4/26/2024    vitamin C (ASCORBIC ACID) 250 MG tablet Take 1 tablet by mouth Daily.   4/26/2024   , and Scheduled Meds:  albumin human, 50 g, Intravenous, BID  amLODIPine, 5 mg, Oral, Daily  apixaban, 2.5 mg, Oral, BID  aspirin, 81 mg, Oral, Daily  atorvastatin, 80 mg, Oral, Daily  insulin lispro, 2-7 Units, Subcutaneous, 4x Daily AC & at Bedtime  lactulose, 10 g, Oral, Daily  levothyroxine, 137 mcg, Oral, QAM  memantine, 5 mg, Oral, BID  midodrine, 5 mg, Oral, TID AC  multivitamin with minerals, 1 tablet, Oral, Daily  pantoprazole, 40 mg, Oral, Q AM  piperacillin-tazobactam, 3.375 g, Intravenous, Q8H  rifAXIMin, 550 mg, Oral, Q12H  senna-docusate sodium, 2 tablet, Oral, BID  sodium chloride, 10 mL, Intravenous, Q12H  vancomycin (dosing per levels), , Does not apply, Daily       Objective     Vital Signs  Temp:  [97.6 °F (36.4 °C)-98.8 °F (37.1 °C)] 98 °F (36.7 °C)  Heart Rate:  [53-73] 64  Resp:  [16-18] 18  BP: (102-132)/(54-66) 102/59    No intake/output data recorded.  I/O last 3 completed shifts:  In: 1551 [P.O.:236; I.V.:1015; IV Piggyback:300]  Out: 225 [Urine:225]    Physical Exam  Constitutional:       General: He is not in acute distress.     Appearance: Normal appearance. He is not ill-appearing.   HENT:      Head: Normocephalic.      Nose: Nose normal.      Mouth/Throat:      Mouth: Mucous membranes are moist.   Eyes:      Pupils: Pupils are equal, round, and reactive to light.   Cardiovascular:      Rate and Rhythm: Normal rate and regular rhythm.   Pulmonary:      Effort: Pulmonary effort is normal.   Abdominal:      General: Abdomen is flat. There is distension.   Musculoskeletal:      Cervical back: Normal range of motion.      Right lower leg: No edema.      Left lower leg: No edema.   Skin:      "General: Skin is warm.   Neurological:      General: No focal deficit present.      Mental Status: He is alert. Mental status is at baseline.      Comments: Baseline dementia         Results Review:    I reviewed the patient's new clinical results.    WBC WBC   Date Value Ref Range Status   04/28/2024 15.00 (H) 3.40 - 10.80 10*3/mm3 Final   04/27/2024 15.76 (H) 3.40 - 10.80 10*3/mm3 Final      HGB Hemoglobin   Date Value Ref Range Status   04/28/2024 10.2 (L) 13.0 - 17.7 g/dL Final   04/27/2024 10.5 (L) 13.0 - 17.7 g/dL Final      HCT Hematocrit   Date Value Ref Range Status   04/28/2024 30.9 (L) 37.5 - 51.0 % Final   04/27/2024 32.0 (L) 37.5 - 51.0 % Final      Platlets No results found for: \"LABPLAT\"   MCV MCV   Date Value Ref Range Status   04/28/2024 87.5 79.0 - 97.0 fL Final   04/27/2024 87.9 79.0 - 97.0 fL Final          Sodium Sodium   Date Value Ref Range Status   04/28/2024 139 136 - 145 mmol/L Final   04/27/2024 139 136 - 145 mmol/L Final   04/27/2024 138 136 - 145 mmol/L Final      Potassium Potassium   Date Value Ref Range Status   04/28/2024 4.2 3.5 - 5.2 mmol/L Final   04/27/2024 4.6 3.5 - 5.2 mmol/L Final   04/27/2024 4.8 3.5 - 5.2 mmol/L Final      Chloride Chloride   Date Value Ref Range Status   04/28/2024 103 98 - 107 mmol/L Final   04/27/2024 103 98 - 107 mmol/L Final   04/27/2024 102 98 - 107 mmol/L Final      CO2 CO2   Date Value Ref Range Status   04/28/2024 24.0 22.0 - 29.0 mmol/L Final   04/27/2024 23.0 22.0 - 29.0 mmol/L Final   04/27/2024 24.0 22.0 - 29.0 mmol/L Final      BUN BUN   Date Value Ref Range Status   04/28/2024 49 (H) 8 - 23 mg/dL Final   04/27/2024 51 (H) 8 - 23 mg/dL Final   04/27/2024 51 (H) 8 - 23 mg/dL Final      Creatinine Creatinine   Date Value Ref Range Status   04/28/2024 2.18 (H) 0.76 - 1.27 mg/dL Final   04/27/2024 2.09 (H) 0.76 - 1.27 mg/dL Final   04/27/2024 2.25 (H) 0.76 - 1.27 mg/dL Final      Calcium Calcium   Date Value Ref Range Status   04/28/2024 8.6 8.2 - " "9.6 mg/dL Final   04/27/2024 8.6 8.2 - 9.6 mg/dL Final   04/27/2024 8.8 8.2 - 9.6 mg/dL Final      PO4 No results found for: \"CAPO4\"   Albumin Albumin   Date Value Ref Range Status   04/27/2024 3.0 (L) 3.5 - 5.2 g/dL Final      Magnesium Magnesium   Date Value Ref Range Status   04/28/2024 2.5 (H) 1.7 - 2.3 mg/dL Final   04/27/2024 1.5 (L) 1.7 - 2.3 mg/dL Final   04/27/2024 1.5 (L) 1.7 - 2.3 mg/dL Final      Uric Acid No results found for: \"URICACID\"         Assessment & Plan       Metabolic encephalopathy    Type 2 diabetes mellitus without complication, without long-term current use of insulin    Primary hypertension    Atrial flutter with controlled response    Chronic anticoagulation    PVD (peripheral vascular disease)    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 2 diabetes mellitus    Cirrhosis of liver    Hx-TIA (transient ischemic attack)    CHF (no ECHO on file currently)    Elevated serum creatinine    CKD (chronic kidney disease) stage 3, GFR 30-59 ml/min    Hypomagnesemia      Assessment & Plan    CHICO:  Cr at baseline 1.2-1.3 mg/dl. Most recent cr ~ 2.0mg/dl. UA bland. Suspect hemodynamic variation in renal function. Risk factor low blood pressure, large volume paracentesis 10 days ago, and ARB+ diuretic use. HRS is also a possibility. Pending urine Na, cl and creatinine.     CKD stage III: Baseline cr 1.2-1.3mg/dl. Age related changes, Additional risk factor for nephrosclerosis, HTN, AS, and Liver disease     AMS: Noted on admission. Improving. Hepatic encephalopathy?. Ammonia normal. Less likely due to uremia. Infectious etiology unidentified     Hypomagnesemia: On admission. Req supp     Hx of liver cirrhosis: complication include recurrent ascites. Requiring paracentesis.     Recs  Start albumin 50mg BID x 2 days for volume expansion   Start midodrine 5 mg TID to target SBP btw 120-130  Check urine na and cr to calculate FeNa  Hold diuretics and ARB for now  Discussed with family at bedside " awaiting palliative consult patient may transition to home hospice. If the plan is for discharge then continue midodrine on discharge. Hold ARB and use diuretics on as needed basis.     I discussed the patients findings and my recommendations with patient    Andres Logan MD  04/28/24  12:44 EDT              Electronically signed by Andres Logan MD at 04/28/24 1309       Jose R Solis MD at 04/28/24 1244        Consult Orders    1. Inpatient Nephrology Consult [[id number]] ordered by Lola Rodrigues MD at 04/28/24 0838                 See Dr. Joseph in 04/28        Electronically signed by Jose R Solis MD at 04/29/24 1230       Guilherme Ta APRN at 04/28/24 0951        Consult Orders    1. Inpatient Gastroenterology Consult [[id number]] ordered by Rajwinder Freeman MD              Attestation signed by Luis Eduardo Burrell MD at 04/28/24 9288    I have reviewed this documentation and agree.                    [facility]  Inpatient Gastroenterology Consult    Inpatient Gastroenterology Consult  Consult performed by: Guilherme Ta APRN  Consult ordered by: Rajwinder Freeman MD  Reason for consult: Courtesy consult to GI; needs outpatient standing order for paracentesis prior to d/c    Referring Provider: No ref. provider found    PCP: Ilir Fair MD    Chief Complaint: confusion     History of present illness:    Onel Clark is a 89 y.o. male who is admitted with worsening confusion.  GI consult received as patient is established with our practice for which she is followed for liver cirrhosis with ascites.  At time of exam, patient is oriented only to self; when asked what year it is, patient responds 1960, when asked where he is currently at he responds a motel, and is unaware of current situation.  No family available at bedside.    Chart review finds that patient was brought to the emergency department for worsening confusion and restlessness.  Patient does not  endorse any issues with N/V/D, abdominal pain, SOB, CP, or F/C.  As noted above, does have history of decompensated liver cirrhosis with ascites and has required recurrent paracentesis on the outpatient basis.  CT imaging obtained last evening shows evidence of reaccumulated ascites 10 days post most recent paracentesis.  Sodium is within normal limits and indicative of failure to adhere to dietary restrictions i.e. low-sodium diet. Past medical, surgical, social, and family histories are reviewed for accuracy.  No documented alleviating or exacerbating factors.  Does not endorse pain at time of exam.    Allergies:  Patient has no known allergies.    Scheduled Meds:  amLODIPine, 5 mg, Oral, Daily  apixaban, 2.5 mg, Oral, BID  aspirin, 81 mg, Oral, Daily  atorvastatin, 80 mg, Oral, Daily  insulin lispro, 2-7 Units, Subcutaneous, 4x Daily AC & at Bedtime  levothyroxine, 137 mcg, Oral, QAM  memantine, 5 mg, Oral, BID  multivitamin with minerals, 1 tablet, Oral, Daily  pantoprazole, 40 mg, Oral, Q AM  piperacillin-tazobactam, 3.375 g, Intravenous, Q8H  senna-docusate sodium, 2 tablet, Oral, BID  sodium chloride, 10 mL, Intravenous, Q12H  vancomycin (dosing per levels), , Does not apply, Daily    Infusions:  Pharmacy to dose vancomycin,     PRN Meds:    senna-docusate sodium **AND** polyethylene glycol **AND** bisacodyl **AND** bisacodyl    Calcium Replacement - Follow Nurse / BPA Driven Protocol    dextrose    dextrose    glucagon (human recombinant)    haloperidol lactate    Magnesium Standard Dose Replacement - Follow Nurse / BPA Driven Protocol    Morphine **AND** naloxone    nitroglycerin    ondansetron    Pharmacy to dose vancomycin    Phosphorus Replacement - Follow Nurse / BPA Driven Protocol    Potassium Replacement - Follow Nurse / BPA Driven Protocol    QUEtiapine    sodium chloride    sodium chloride    sodium chloride    Home Meds:  Medications Prior to Admission   Medication Sig Dispense Refill Last Dose     amLODIPine (NORVASC) 5 MG tablet TAKE 1 TABLET BY MOUTH EVERY DAY 90 tablet 0 4/26/2024    apixaban (ELIQUIS) 2.5 MG tablet tablet Take 1 tablet by mouth Daily. (Patient taking differently: Take 1 tablet by mouth Daily. BID) 180 tablet 3 4/26/2024    ASPIRIN 81 PO Take 81 mg by mouth Daily.   4/26/2024    atorvastatin (LIPITOR) 80 MG tablet TAKE 1 TABLET BY MOUTH AT BEDTIME 90 tablet 1 4/26/2024    bumetanide (BUMEX) 2 MG tablet Take 1 tablet by mouth Daily. 90 tablet 3 4/26/2024    CALCIUM CARB-CHOLECALCIFEROL PO Take  by mouth.   4/26/2024    Cetirizine HCl 10 MG capsule Take 10 mg by mouth Daily With Breakfast. Indications: Hayfever   4/26/2024    levothyroxine (SYNTHROID, LEVOTHROID) 137 MCG tablet TAKE 1 TABLET BY MOUTH EVERY MORNING 90 tablet 0 4/26/2024    losartan (COZAAR) 50 MG tablet TAKE 1 TABLET BY MOUTH EVERY DAY 90 tablet 0 4/26/2024    Magnesium 400 MG capsule Take 400 mg by mouth Daily.   4/26/2024    meloxicam (MOBIC) 7.5 MG tablet    4/26/2024    memantine (NAMENDA) 5 MG tablet TAKE 1 TABLET BY MOUTH 2 TIMES A  tablet 0 4/26/2024    metFORMIN (GLUCOPHAGE) 1000 MG tablet Take 1 tablet by mouth 2 (Two) Times a Day With Meals. 180 tablet 1 4/26/2024    multivitamin with minerals (MENS MULTI VITAMIN & MINERAL PO) Take 1 tablet by mouth Daily.   4/26/2024    omeprazole (priLOSEC) 40 MG capsule TAKE 1 CAPSULE BY MOUTH EVERY DAY 90 capsule 0 4/26/2024    vitamin C (ASCORBIC ACID) 250 MG tablet Take 1 tablet by mouth Daily.   4/26/2024     ROS: Review of Systems   Unable to perform ROS: Other     PAST MED HX:  Past Medical History:   Diagnosis Date    Acquired hypothyroidism     Allergic     Allergies     Arthritis     Benign prostatic hyperplasia with nocturia     Bilateral carotid artery disease     Cancer of the skin, basal cell     Cataract     CHF (congestive heart failure)     Chronic non-seasonal allergic rhinitis     DUE TO POLLEN    Chronic renal impairment     Cirrhosis of liver 04/27/2024     CKD (chronic kidney disease) stage 3, GFR 30-59 ml/min 04/27/2024    COPD (chronic obstructive pulmonary disease)     Coronary artery disease     Decreased hearing of both ears     Degenerative lumbar spinal stenosis     Diabetes mellitus     Elevated PSA     Frequent falls     GERD without esophagitis     Heart murmur     High risk medication use     HL (hearing loss)     Hx of bilateral hip replacements     Hx of decompressive lumbar laminectomy     Hx of total knee replacement, bilateral     Hx of transient ischemic attack (TIA)     Hypertension     Mixed hyperlipidemia due to type 2 diabetes mellitus     Nicotine dependence     No natural teeth     FALSE TEETH    Osteoarthritis     Primary hypertension     Primary osteoarthritis involving multiple joints     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 2 diabetes mellitus     PVD (peripheral vascular disease) 04/27/2024    Stroke TIA  2 years ago    Thyroid disease     TIA (transient ischemic attack)     Type 2 diabetes mellitus with diabetic polyneuropathy     Type 2 diabetes mellitus with stage 2 chronic kidney disease, without long-term current use of insulin        PAST SURG HX:  Past Surgical History:   Procedure Laterality Date    BACK SURGERY  2007    Lumbar Laminectomy    CARPAL TUNNEL RELEASE  2012    COLONOSCOPY      EYE SURGERY  cataracts    JOINT REPLACEMENT  both hips & knee & back    REPLACEMENT TOTAL KNEE Left 2013    SPINE SURGERY  about 15 yrs ago    TOTAL HIP ARTHROPLASTY Right 2016       FAM HX:  Family History   Problem Relation Age of Onset    Alzheimer's disease Mother     Diabetes Mother     Hyperlipidemia Mother     Heart attack Father     Stroke Father     Coronary artery disease Father     Hearing loss Father     Hyperlipidemia Father     Diabetes Sister     Hypertension Sister     Hyperlipidemia Sister     Arthritis Sister     Arthritis Brother     Diabetes Brother     Hearing loss Brother        SOC HX:  Social History     Socioeconomic History     "Marital status:     Number of children: 1   Tobacco Use    Smoking status: Former     Current packs/day: 0.00     Average packs/day: 1 pack/day for 10.0 years (10.0 ttl pk-yrs)     Types: Cigarettes     Start date:      Quit date:      Years since quittin.3     Passive exposure: Past    Smokeless tobacco: Never    Tobacco comments:     None   Vaping Use    Vaping status: Never Used   Substance and Sexual Activity    Alcohol use: Yes     Alcohol/week: 1.0 standard drink of alcohol     Types: 1 Glasses of wine per week     Comment: None    Drug use: No    Sexual activity: Not Currently     Partners: Male     Comment: With  (male) until lately - old age problems       PHYSICAL EXAM  /62 (BP Location: Right arm, Patient Position: Lying)   Pulse 66   Temp 97.8 °F (36.6 °C) (Oral)   Resp 18   Ht 182.9 cm (72\")   Wt 90.3 kg (199 lb)   SpO2 96%   BMI 26.99 kg/m²   Wt Readings from Last 3 Encounters:   24 90.3 kg (199 lb)   24 90.3 kg (199 lb)   24 88.5 kg (195 lb)   ,body mass index is 26.99 kg/m².  Physical Exam  Vitals and nursing note reviewed.   Constitutional:       General: He is not in acute distress.     Appearance: He is ill-appearing. He is not toxic-appearing.      Comments: Patient is an elderly, frail-appearing male with marked cachexia and temporal muscle wasting.   HENT:      Head: Normocephalic and atraumatic.   Eyes:      General: No scleral icterus.     Extraocular Movements: Extraocular movements intact.      Pupils: Pupils are equal, round, and reactive to light.   Cardiovascular:      Rate and Rhythm: Normal rate. Rhythm irregular.      Heart sounds: Murmur heard.   Pulmonary:      Effort: Pulmonary effort is normal.      Breath sounds: Normal breath sounds.   Abdominal:      General: Abdomen is flat. Bowel sounds are normal. There is no distension.      Palpations: Abdomen is soft. There is no mass.      Tenderness: There is no abdominal " tenderness. There is no guarding or rebound.      Hernia: No hernia is present.   Genitourinary:     Comments: Defer  Musculoskeletal:         General: Normal range of motion.      Right lower leg: No edema.      Left lower leg: No edema.   Skin:     General: Skin is warm and dry.      Capillary Refill: Capillary refill takes less than 2 seconds.      Coloration: Skin is pale. Skin is not jaundiced.   Neurological:      Mental Status: He is alert.      Comments: Alert to person but disoriented otherwise; tremor of right upper extremity noted on exam, no asterixis on left   Psychiatric:      Comments: Pleasantly confused   Results Review:   I reviewed the patient's new clinical results.  I reviewed the patient's new imaging results and agree with the interpretation.  I reviewed the patient's other test results and agree with the interpretation  I personally viewed and interpreted the patient's EKG/Telemetry data    Lab Results   Component Value Date    WBC 15.00 (H) 04/28/2024    HGB 10.2 (L) 04/28/2024    HGB 10.5 (L) 04/27/2024    HGB 11.6 (L) 02/29/2024    HCT 30.9 (L) 04/28/2024    MCV 87.5 04/28/2024     04/28/2024       Lab Results   Component Value Date    INR 1.38 (H) 04/18/2024    INR 1.12 01/22/2024    INR 1.08 01/22/2024       Lab Results   Component Value Date    GLUCOSE 165 (H) 04/28/2024    BUN 49 (H) 04/28/2024    CREATININE 2.18 (H) 04/28/2024    BCR 22.5 04/28/2024     04/28/2024    K 4.2 04/28/2024    CO2 24.0 04/28/2024    CALCIUM 8.6 04/28/2024    PROTENTOTREF 6.5 02/29/2024    ALBUMIN 3.0 (L) 04/27/2024    ALKPHOS 222 (H) 04/27/2024    BILITOT 1.0 04/27/2024    ALT 9 04/27/2024    AST 18 04/27/2024       CT Chest Without Contrast Diagnostic    Result Date: 4/27/2024  CT HEAD WO CONTRAST, CT CHEST WO CONTRAST DIAGNOSTIC, CT ABDOMEN PELVIS WO CONTRAST Date of Exam: 4/27/2024 2:51 PM EDT Indication: AMS, confusion. Comparison: 12/4/2023 chest CT and 1/3/2024 PET/CT Technique: Axial CT  images were obtained of the head, chest, abdomen, and pelvis without contrast administration.  Automated exposure control and iterative construction methods were used. Findings: CT HEAD: There is no evidence of acute intracranial hemorrhage, mass, midline shift, loss of gray-white matter differentiation, or extra-axial fluid collection. Subcortical periventricular white matter hypodensities are present consistent with small vessel ischemic disease. There is extensive global parenchymal volume loss with ex vacuo dilation of the ventricular system. The basal cisterns are patent. There is no fracture or suspicious osseous lesion. Mild mucosal thickening of the bilateral maxillary sinuses. Partial mastoid air cell effusions. Bilateral ocular surgery. Soft tissues are unremarkable. CT CHEST: The central airways are patent. There are hypoventilatory changes of the lung bases, left greater than right, with associated left lower lobe rounded atelectasis. This is unchanged from recent PET/CT. There is no new focal airspace consolidation. No new suspicious pulmonary nodule or mass. Unchanged small left pleural effusion, likely chronic given associated pleural wall thickening. Unchanged cardiomegaly. Dense atherosclerotic calcifications of the coronary arteries and aortic valve. There are also dense thoracic aortic calcifications. Trace pericardial effusion. No mediastinal mass or threshold lymphadenopathy. Chest wall soft tissues show no acute abnormality. Gynecomastia. No evidence of chest wall fracture or suspicious osseous lesion. CT ABDOMEN/PELVIS: Cirrhotic morphology of the liver. No suspicious hepatic lesion. The gallbladder is unremarkable. No significant biliary ductal dilation. The spleen, pancreas, and bilateral adrenal glands show no acute abnormality. There is a small right superior pole renal cyst, unchanged. Bilateral renal parenchymal scarring/thinning. No hydronephrosis or nephrolithiasis. No suspicious renal  lesion. Small hiatal hernia. Thickening of the gastric wall which may be secondary to under distention. There is no evidence of bowel obstruction. Colonic diverticulosis without definite evidence of diverticulitis. Appendix is not well visualized and may be surgically absent or decompressed. There is no evidence of suspicious lymphadenopathy. There are shotty retroperitoneal and mesenteric lymph nodes which are unchanged from January 2024 PET/CT. There is moderate abdominopelvic ascites with autumn mesentery. No evidence of abdominal aortic aneurysm. Dense atherosclerotic calcifications of the aorta and branch vessels. The urinary bladder and prostate is difficult to evaluate given adjacent metallic artifact, however is grossly unremarkable. The abdominal and pelvic wall soft tissues show no acute abnormality. There is mild diffuse fatty muscle atrophy without discrete fluid collection. Bilateral hip arthroplasties. Cerclage fixation of the right intertrochanteric region. Chronic compression deformity of L1 vertebral body. No evidence of acute fracture or suspicious bony lesion.     Impression: CT HEAD: No acute intracranial abnormality. Chronic sequela of probable small vessel ischemic disease and severe global parenchymal volume loss. CT CHEST: No acute abnormality in the chest. Bilateral hypoventilatory changes with round atelectasis in the left lung base. Chronic adjacent small left pleural effusion with pleural wall thickening, not significantly changed from December 2023 chest CT.  Chronic ancillary findings as above. CT ABDOMEN/PELVIS: No acute abnormality in the abdomen or pelvis. Cirrhosis with moderate abdominopelvic ascites. Chronic ancillary findings as above. Electronically Signed: Kenroy cMneil MD  4/27/2024 3:30 PM EDT  Workstation ID: [id number]    CT Abdomen Pelvis Without Contrast    Result Date: 4/27/2024  CT HEAD WO CONTRAST, CT CHEST WO CONTRAST DIAGNOSTIC, CT ABDOMEN PELVIS WO CONTRAST Date of  Exam: 4/27/2024 2:51 PM EDT Indication: AMS, confusion. Comparison: 12/4/2023 chest CT and 1/3/2024 PET/CT Technique: Axial CT images were obtained of the head, chest, abdomen, and pelvis without contrast administration.  Automated exposure control and iterative construction methods were used. Findings: CT HEAD: There is no evidence of acute intracranial hemorrhage, mass, midline shift, loss of gray-white matter differentiation, or extra-axial fluid collection. Subcortical periventricular white matter hypodensities are present consistent with small vessel ischemic disease. There is extensive global parenchymal volume loss with ex vacuo dilation of the ventricular system. The basal cisterns are patent. There is no fracture or suspicious osseous lesion. Mild mucosal thickening of the bilateral maxillary sinuses. Partial mastoid air cell effusions. Bilateral ocular surgery. Soft tissues are unremarkable. CT CHEST: The central airways are patent. There are hypoventilatory changes of the lung bases, left greater than right, with associated left lower lobe rounded atelectasis. This is unchanged from recent PET/CT. There is no new focal airspace consolidation. No new suspicious pulmonary nodule or mass. Unchanged small left pleural effusion, likely chronic given associated pleural wall thickening. Unchanged cardiomegaly. Dense atherosclerotic calcifications of the coronary arteries and aortic valve. There are also dense thoracic aortic calcifications. Trace pericardial effusion. No mediastinal mass or threshold lymphadenopathy. Chest wall soft tissues show no acute abnormality. Gynecomastia. No evidence of chest wall fracture or suspicious osseous lesion. CT ABDOMEN/PELVIS: Cirrhotic morphology of the liver. No suspicious hepatic lesion. The gallbladder is unremarkable. No significant biliary ductal dilation. The spleen, pancreas, and bilateral adrenal glands show no acute abnormality. There is a small right superior pole  renal cyst, unchanged. Bilateral renal parenchymal scarring/thinning. No hydronephrosis or nephrolithiasis. No suspicious renal lesion. Small hiatal hernia. Thickening of the gastric wall which may be secondary to under distention. There is no evidence of bowel obstruction. Colonic diverticulosis without definite evidence of diverticulitis. Appendix is not well visualized and may be surgically absent or decompressed. There is no evidence of suspicious lymphadenopathy. There are shotty retroperitoneal and mesenteric lymph nodes which are unchanged from January 2024 PET/CT. There is moderate abdominopelvic ascites with autumn mesentery. No evidence of abdominal aortic aneurysm. Dense atherosclerotic calcifications of the aorta and branch vessels. The urinary bladder and prostate is difficult to evaluate given adjacent metallic artifact, however is grossly unremarkable. The abdominal and pelvic wall soft tissues show no acute abnormality. There is mild diffuse fatty muscle atrophy without discrete fluid collection. Bilateral hip arthroplasties. Cerclage fixation of the right intertrochanteric region. Chronic compression deformity of L1 vertebral body. No evidence of acute fracture or suspicious bony lesion.     Impression: CT HEAD: No acute intracranial abnormality. Chronic sequela of probable small vessel ischemic disease and severe global parenchymal volume loss. CT CHEST: No acute abnormality in the chest. Bilateral hypoventilatory changes with round atelectasis in the left lung base. Chronic adjacent small left pleural effusion with pleural wall thickening, not significantly changed from December 2023 chest CT.  Chronic ancillary findings as above. CT ABDOMEN/PELVIS: No acute abnormality in the abdomen or pelvis. Cirrhosis with moderate abdominopelvic ascites. Chronic ancillary findings as above. Electronically Signed: Kenroy Mcneil MD  4/27/2024 3:30 PM EDT  Workstation ID: [id number]    CT Head Without  Contrast    Result Date: 4/27/2024  CT HEAD WO CONTRAST, CT CHEST WO CONTRAST DIAGNOSTIC, CT ABDOMEN PELVIS WO CONTRAST Date of Exam: 4/27/2024 2:51 PM EDT Indication: AMS, confusion. Comparison: 12/4/2023 chest CT and 1/3/2024 PET/CT Technique: Axial CT images were obtained of the head, chest, abdomen, and pelvis without contrast administration.  Automated exposure control and iterative construction methods were used. Findings: CT HEAD: There is no evidence of acute intracranial hemorrhage, mass, midline shift, loss of gray-white matter differentiation, or extra-axial fluid collection. Subcortical periventricular white matter hypodensities are present consistent with small vessel ischemic disease. There is extensive global parenchymal volume loss with ex vacuo dilation of the ventricular system. The basal cisterns are patent. There is no fracture or suspicious osseous lesion. Mild mucosal thickening of the bilateral maxillary sinuses. Partial mastoid air cell effusions. Bilateral ocular surgery. Soft tissues are unremarkable. CT CHEST: The central airways are patent. There are hypoventilatory changes of the lung bases, left greater than right, with associated left lower lobe rounded atelectasis. This is unchanged from recent PET/CT. There is no new focal airspace consolidation. No new suspicious pulmonary nodule or mass. Unchanged small left pleural effusion, likely chronic given associated pleural wall thickening. Unchanged cardiomegaly. Dense atherosclerotic calcifications of the coronary arteries and aortic valve. There are also dense thoracic aortic calcifications. Trace pericardial effusion. No mediastinal mass or threshold lymphadenopathy. Chest wall soft tissues show no acute abnormality. Gynecomastia. No evidence of chest wall fracture or suspicious osseous lesion. CT ABDOMEN/PELVIS: Cirrhotic morphology of the liver. No suspicious hepatic lesion. The gallbladder is unremarkable. No significant biliary  ductal dilation. The spleen, pancreas, and bilateral adrenal glands show no acute abnormality. There is a small right superior pole renal cyst, unchanged. Bilateral renal parenchymal scarring/thinning. No hydronephrosis or nephrolithiasis. No suspicious renal lesion. Small hiatal hernia. Thickening of the gastric wall which may be secondary to under distention. There is no evidence of bowel obstruction. Colonic diverticulosis without definite evidence of diverticulitis. Appendix is not well visualized and may be surgically absent or decompressed. There is no evidence of suspicious lymphadenopathy. There are shotty retroperitoneal and mesenteric lymph nodes which are unchanged from January 2024 PET/CT. There is moderate abdominopelvic ascites with autumn mesentery. No evidence of abdominal aortic aneurysm. Dense atherosclerotic calcifications of the aorta and branch vessels. The urinary bladder and prostate is difficult to evaluate given adjacent metallic artifact, however is grossly unremarkable. The abdominal and pelvic wall soft tissues show no acute abnormality. There is mild diffuse fatty muscle atrophy without discrete fluid collection. Bilateral hip arthroplasties. Cerclage fixation of the right intertrochanteric region. Chronic compression deformity of L1 vertebral body. No evidence of acute fracture or suspicious bony lesion.     Impression: CT HEAD: No acute intracranial abnormality. Chronic sequela of probable small vessel ischemic disease and severe global parenchymal volume loss. CT CHEST: No acute abnormality in the chest. Bilateral hypoventilatory changes with round atelectasis in the left lung base. Chronic adjacent small left pleural effusion with pleural wall thickening, not significantly changed from December 2023 chest CT.  Chronic ancillary findings as above. CT ABDOMEN/PELVIS: No acute abnormality in the abdomen or pelvis. Cirrhosis with moderate abdominopelvic ascites. Chronic ancillary findings  as above. Electronically Signed: Kenroy Mcneil MD  4/27/2024 3:30 PM EDT  Workstation ID: [id number]    XR Chest 1 View    Result Date: 4/27/2024  XR CHEST 1 VW Date of Exam: 4/27/2024 12:50 PM EDT Indication: Weak/Dizzy/AMS triage protocol Comparison: PET/CT January 3, 2024, chest CT December 4, 2023, chest radiographs November 15, 2023. Findings: Left mid and lower lung pleural and parenchymal opacities appear similar to prior exams. There is a suspected small left pleural effusion component. No right lung infiltrate or pleural effusion. No significant pneumothorax component is identified. Heart size appears within normal limits. There is atherosclerosis of the aorta.     Impression: 1.Left mid and lower lung pleural and parenchymal opacities appear grossly similar to recent prior studies. 2.No definite radiographic findings of acute cardiopulmonary abnormality. Electronically Signed: Samir Sanchez  4/27/2024 1:24 PM EDT  Workstation ID: [id number]    US Paracentesis    Result Date: 4/18/2024  US PARACENTESIS  Date of Exam: 4/18/2024 9:46 AM EDT  Indication: cirrhosis with ascites.  Comparison: None available.  Technique: A thorough discussion of the procedure, including the risks, benefits, and alternatives of the procedure was carried out with the patient. An informed written consent was obtained. A preprocedure timeout was performed. The interventional radiology nurse monitored the patient at all times. The patient was placed in supine position and a limited ultrasound was performed on the abdomen. Large amount of ascites noted. The right lower quadrant was then marked and prepped and draped in the usual sterile fashion. The skin and subcutaneous tissue was anesthetized with 1% lidocaine. Next, a 5 [place] centesis needle was advanced using ultrasound guidance into the peritoneal space. A total of 5 L of straw-colored fluid was removed. The needle was then removed, hemostasis obtained, and a sterile dressing applied.  Patient tolerated the procedure well without immediate complication.  Albumin was infused intravenously if ordered by the referring doctor.  Findings: See above      Impression: Successful ultrasound-guided paracentesis using a right lower quadrant access site with recovery of 5 L of fluid as described above.  I, Franky Monk MD, have personally reviewed the image(s) and the prepared and signed interpretation by Juliette Goetz NP.  Based on my review, I agree with the findings.   Report dictated by: EMMETT Lou  I have personally reviewed this case and agree with the findings above: Electronically Signed: Franky Monk MD  4/18/2024 4:31 PM EDT  Workstation ID: [id number]     ASSESSMENTS/PLANS  1.  Decompensated liver cirrhosis with ascites  MELD-Na: 18 points  Child-Rodrigez: B  2.  Ascites, related to above  3.  Metabolic encephalopathy, suspected underlying hepatic encephalopathy  4.  Sarcopenia, related to above  5.  CKD, creatinine elevated above baseline.  6.  Type 2 diabetes mellitus, diabetic left foot wound  7.  Atrial flutter, anticoagulated on Eliquis  8.  Cognitive impairment  9.  Aortic stenosis    Onel Clark is a 89 y.o. male presents to hospital with altered mental status.  GI consult received for treatment planning going forward in terms of patient's decompensated liver cirrhosis.      Patient has required a total of 3 paracentesis this year (January, March, and April) with most recent CT imaging showing reaccumulated ascites over the past 9 days.  Patient sodium is also within normal limits and I strongly suspect he is not following a sodium restricted diet such.  Additionally, renal function is impaired which limits diuretics for management of his ascites.    Overall, patient is markedly muscle wasted and physically debilitated with poor ECOG status.  Agree with plans for palliative care and possible hospice going forward given his overall medical state.  Patient is medically complex  and high risk of further decline.    For management of ascites  Daily weights  When +10 pounds, contact GI for paracentesis  Standing order for paracentesis will be placed by gastroenterology office  Continue to address nutrition; desperately needs a high-protein diet  Diuresis as renal function allows  Will only consider placement of tenkhoff catheter if going home with hospice.   For general cirrhosis care  Nutrition  high-protein / low sodium (less than 2g/day) diet  Frequent high-protein snacks  High-protein bedtime snack  AFP and ultrasound imaging up-to-date  Discontinue meloxicam; may use Tylenol up to 2 g daily  Avoid hepatotoxins  For altered mental status  Suspect there is underlying component of hepatic encephalopathy at play  Lactulose 10 g p.o. daily  Xifaxan 550 mg p.o. twice daily    As noted above, overall long-term prognosis is poor given patient's worsening weakness and muscle wasting and overall poor ECOG status.  Palliative care consult certainly appropriate as would be a hospice consult for ongoing goals of care and end-of-life care discussions.    I discussed the patient's findings and my recommendations with patient and consulting provider    EMMETT Hilliard  04/28/24  09:51 EDT        Electronically signed by Luis Eduardo Burrell MD at 04/28/24 1547       Candi Yi APRN at 04/28/24 0940        Consult Orders    1. Inpatient Palliative Care MD Consult [[id number]] ordered by Rajwinder Freeman MD at 04/27/24 2663                 Palliative Care Initial Consult   Attending Physician: Lola Rodrigues MD  Referring Provider: Dr. Rajwinder Freeman    Reason for Referral:  assistance with clarification of goals of care    Code Status:   Code Status and Medical Interventions:   Ordered at: 04/27/24 8014     Medical Intervention Limits:    NO intubation (DNI)    NO cardioversion    NO antiarrhythmic drugs    NO dialysis    NO artificial nutrition     Code Status (Patient has no pulse and is not  breathing):    No CPR (Do Not Attempt to Resuscitate)     Medical Interventions (Patient has pulse or is breathing):    Limited Support      Advanced Directives:    Family/Support: Jose Juan Clark (son), Leni Clark (spouse)  Goals of Care: TBD.    HPI: Onel Clark is a 89 y.o. male with PMH significant for cirrhosis requiring frequent paracentesis, T2DM, TIA, HTN, Atrial flutter on chronic anticoagulation, PVD, CKD III, chronic debility. Patient [facility] ED on 4/27 from home where he lives with his wife due to confusion and restlessness. Work up revealed elevated CRP, leukocytosis, and hypomagnesemia. CT showing abdominal ascites, diabetic left foot wound 1st toe and heel. Elevated Cr noted. GI following for decompensated liver cirrhosis, patient with three paracentesis this year (Jan/March/April) recommending hospice. Palliative Care consulted for GOC in the context of complex medical decision making.   Initial visit with patient finishing up lunch, pleasantly confused. Denies pain, shortness of breath, anxiety. He is able to answer questions regarding ROS but unable to engage in GOC discussion.   Return visit with wife, son, and daughter-in-law at bedside. Patient sleeping throughout visit. Patient requires lift chair at home, uses a FWW, limited mobility, limited to house.     ROS: Denies pain, shortness of breath, anxiety, nausea.       Past Medical History:   Diagnosis Date    Acquired hypothyroidism     Allergic     Allergies     Arthritis     Benign prostatic hyperplasia with nocturia     Bilateral carotid artery disease     Cancer of the skin, basal cell     Cataract     CHF (congestive heart failure)     Chronic non-seasonal allergic rhinitis     DUE TO POLLEN    Chronic renal impairment     Cirrhosis of liver 04/27/2024    CKD (chronic kidney disease) stage 3, GFR 30-59 ml/min 04/27/2024    COPD (chronic obstructive pulmonary disease)     Coronary artery disease     Decreased hearing of both  ears     Degenerative lumbar spinal stenosis     Diabetes mellitus     Elevated PSA     Frequent falls     GERD without esophagitis     Heart murmur     High risk medication use     HL (hearing loss)     Hx of bilateral hip replacements     Hx of decompressive lumbar laminectomy     Hx of total knee replacement, bilateral     Hx of transient ischemic attack (TIA)     Hypertension     Mixed hyperlipidemia due to type 2 diabetes mellitus     Nicotine dependence     No natural teeth     FALSE TEETH    Osteoarthritis     Primary hypertension     Primary osteoarthritis involving multiple joints     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 2 diabetes mellitus     PVD (peripheral vascular disease) 2024    Stroke TIA  2 years ago    Thyroid disease     TIA (transient ischemic attack)     Type 2 diabetes mellitus with diabetic polyneuropathy     Type 2 diabetes mellitus with stage 2 chronic kidney disease, without long-term current use of insulin      Past Surgical History:   Procedure Laterality Date    BACK SURGERY  2007    Lumbar Laminectomy    CARPAL TUNNEL RELEASE      COLONOSCOPY      EYE SURGERY  cataracts    JOINT REPLACEMENT  both hips & knee & back    REPLACEMENT TOTAL KNEE Left 2013    SPINE SURGERY  about 15 yrs ago    TOTAL HIP ARTHROPLASTY Right 2016     Social History     Socioeconomic History    Marital status:     Number of children: 1   Tobacco Use    Smoking status: Former     Current packs/day: 0.00     Average packs/day: 1 pack/day for 10.0 years (10.0 ttl pk-yrs)     Types: Cigarettes     Start date:      Quit date: 2000     Years since quittin.3     Passive exposure: Past    Smokeless tobacco: Never    Tobacco comments:     None   Vaping Use    Vaping status: Never Used   Substance and Sexual Activity    Alcohol use: Yes     Alcohol/week: 1.0 standard drink of alcohol     Types: 1 Glasses of wine per week     Comment: None    Drug use: No    Sexual activity: Not Currently     Partners: Male     " Comment: With  (male) until lately - old age problems     Family History   Problem Relation Age of Onset    Alzheimer's disease Mother     Diabetes Mother     Hyperlipidemia Mother     Heart attack Father     Stroke Father     Coronary artery disease Father     Hearing loss Father     Hyperlipidemia Father     Diabetes Sister     Hypertension Sister     Hyperlipidemia Sister     Arthritis Sister     Arthritis Brother     Diabetes Brother     Hearing loss Brother        No Known Allergies    Current medication reviewed for route, type, dose and frequency and are current per MAR at time of dictation.    Palliative Performance Scale Score:  40%    /62 (BP Location: Right arm, Patient Position: Lying)   Pulse 66   Temp 97.8 °F (36.6 °C) (Oral)   Resp 18   Ht 182.9 cm (72\")   Wt 90.3 kg (199 lb)   SpO2 96%   BMI 26.99 kg/m²     Intake/Output Summary (Last 24 hours) at 4/28/2024 0940  Last data filed at 4/28/2024 0634  Gross per 24 hour   Intake 1551 ml   Output 225 ml   Net 1326 ml       Physical Exam:    General Appearance:    Patient sitting up in chair, awake, alert, A/C ill appearing, frail, cooperative, NAD   HEENT:    NC/AT, EOMI, anicteric, MMM, face relaxed   Neck:   supple, trachea midline, no JVD   Lungs:     CTA bilat, diminished in bases; respirations regular, even and unlabored; RR 16-18 on exam    Heart:    murmur, HR 64   Abdomen:     Normal bowel sounds, soft, nontender, distended, ascites   G/U:   Deferred   MSK/Extremities:   Wasting, no edema, wounds to BLE's with dressings CDI   Pulses:   Pulses palpable and equal bilaterally   Skin:   Warm, dry   Neurologic:   A/Ox1, pleasantly confused,  cooperative, PÉREZ   Psych:   Calm, appropriate         Labs:   Results from last 7 days   Lab Units 04/28/24  0906   WBC 10*3/mm3 15.00*   HEMOGLOBIN g/dL 10.2*   HEMATOCRIT % 30.9*   PLATELETS 10*3/mm3 335     Results from last 7 days   Lab Units 04/28/24  0440   SODIUM mmol/L 139   POTASSIUM " mmol/L 4.2   CHLORIDE mmol/L 103   CO2 mmol/L 24.0   BUN mg/dL 49*   CREATININE mg/dL 2.18*   GLUCOSE mg/dL 165*   CALCIUM mg/dL 8.6     Results from last 7 days   Lab Units 04/28/24  0440 04/27/24  1755 04/27/24  1257   SODIUM mmol/L 139   < > 138   POTASSIUM mmol/L 4.2   < > 4.8   CHLORIDE mmol/L 103   < > 102   CO2 mmol/L 24.0   < > 24.0   BUN mg/dL 49*   < > 51*   CREATININE mg/dL 2.18*   < > 2.25*   CALCIUM mg/dL 8.6   < > 8.8   BILIRUBIN mg/dL  --   --  1.0   ALK PHOS U/L  --   --  222*   ALT (SGPT) U/L  --   --  9   AST (SGOT) U/L  --   --  18   GLUCOSE mg/dL 165*   < > 134*    < > = values in this interval not displayed.     Imaging Results (Last 72 Hours)       Procedure Component Value Units Date/Time    MRI Brain Without Contrast [[id number]] Resulted: 04/28/24 0434     Updated: 04/28/24 0441    CT Chest Without Contrast Diagnostic [[id number]] Collected: 04/27/24 1520     Updated: 04/27/24 1533    Narrative:      CT HEAD WO CONTRAST, CT CHEST WO CONTRAST DIAGNOSTIC, CT ABDOMEN PELVIS WO CONTRAST    Date of Exam: 4/27/2024 2:51 PM EDT    Indication: AMS, confusion.    Comparison: 12/4/2023 chest CT and 1/3/2024 PET/CT    Technique: Axial CT images were obtained of the head, chest, abdomen, and pelvis without contrast administration.  Automated exposure control and iterative construction methods were used.      Findings:  CT HEAD:  There is no evidence of acute intracranial hemorrhage, mass, midline shift, loss of gray-white matter differentiation, or extra-axial fluid collection. Subcortical periventricular white matter hypodensities are present consistent with small vessel   ischemic disease. There is extensive global parenchymal volume loss with ex vacuo dilation of the ventricular system. The basal cisterns are patent.    There is no fracture or suspicious osseous lesion. Mild mucosal thickening of the bilateral maxillary sinuses. Partial mastoid air cell effusions. Bilateral ocular surgery. Soft tissues  are unremarkable.    CT CHEST:  The central airways are patent. There are hypoventilatory changes of the lung bases, left greater than right, with associated left lower lobe rounded atelectasis. This is unchanged from recent PET/CT. There is no new focal airspace consolidation. No new   suspicious pulmonary nodule or mass. Unchanged small left pleural effusion, likely chronic given associated pleural wall thickening.    Unchanged cardiomegaly. Dense atherosclerotic calcifications of the coronary arteries and aortic valve. There are also dense thoracic aortic calcifications. Trace pericardial effusion. No mediastinal mass or threshold lymphadenopathy.    Chest wall soft tissues show no acute abnormality. Gynecomastia. No evidence of chest wall fracture or suspicious osseous lesion.    CT ABDOMEN/PELVIS:  Cirrhotic morphology of the liver. No suspicious hepatic lesion.    The gallbladder is unremarkable. No significant biliary ductal dilation.    The spleen, pancreas, and bilateral adrenal glands show no acute abnormality.    There is a small right superior pole renal cyst, unchanged. Bilateral renal parenchymal scarring/thinning. No hydronephrosis or nephrolithiasis. No suspicious renal lesion.    Small hiatal hernia. Thickening of the gastric wall which may be secondary to under distention. There is no evidence of bowel obstruction. Colonic diverticulosis without definite evidence of diverticulitis. Appendix is not well visualized and may be   surgically absent or decompressed.    There is no evidence of suspicious lymphadenopathy. There are shotty retroperitoneal and mesenteric lymph nodes which are unchanged from January 2024 PET/CT. There is moderate abdominopelvic ascites with autumn mesentery. No evidence of abdominal aortic   aneurysm. Dense atherosclerotic calcifications of the aorta and branch vessels.    The urinary bladder and prostate is difficult to evaluate given adjacent metallic artifact, however is  grossly unremarkable.    The abdominal and pelvic wall soft tissues show no acute abnormality. There is mild diffuse fatty muscle atrophy without discrete fluid collection.    Bilateral hip arthroplasties. Cerclage fixation of the right intertrochanteric region. Chronic compression deformity of L1 vertebral body. No evidence of acute fracture or suspicious bony lesion.      Impression:      Impression:  CT HEAD: No acute intracranial abnormality. Chronic sequela of probable small vessel ischemic disease and severe global parenchymal volume loss.    CT CHEST: No acute abnormality in the chest. Bilateral hypoventilatory changes with round atelectasis in the left lung base. Chronic adjacent small left pleural effusion with pleural wall thickening, not significantly changed from December 2023 chest CT.   Chronic ancillary findings as above.    CT ABDOMEN/PELVIS: No acute abnormality in the abdomen or pelvis. Cirrhosis with moderate abdominopelvic ascites. Chronic ancillary findings as above.        Electronically Signed: Kneroy Mcneil MD    4/27/2024 3:30 PM EDT    Workstation ID: [id number]    CT Abdomen Pelvis Without Contrast [[id number]] Collected: 04/27/24 1520     Updated: 04/27/24 1533    Narrative:      CT HEAD WO CONTRAST, CT CHEST WO CONTRAST DIAGNOSTIC, CT ABDOMEN PELVIS WO CONTRAST    Date of Exam: 4/27/2024 2:51 PM EDT    Indication: AMS, confusion.    Comparison: 12/4/2023 chest CT and 1/3/2024 PET/CT    Technique: Axial CT images were obtained of the head, chest, abdomen, and pelvis without contrast administration.  Automated exposure control and iterative construction methods were used.      Findings:  CT HEAD:  There is no evidence of acute intracranial hemorrhage, mass, midline shift, loss of gray-white matter differentiation, or extra-axial fluid collection. Subcortical periventricular white matter hypodensities are present consistent with small vessel   ischemic disease. There is extensive global  parenchymal volume loss with ex vacuo dilation of the ventricular system. The basal cisterns are patent.    There is no fracture or suspicious osseous lesion. Mild mucosal thickening of the bilateral maxillary sinuses. Partial mastoid air cell effusions. Bilateral ocular surgery. Soft tissues are unremarkable.    CT CHEST:  The central airways are patent. There are hypoventilatory changes of the lung bases, left greater than right, with associated left lower lobe rounded atelectasis. This is unchanged from recent PET/CT. There is no new focal airspace consolidation. No new   suspicious pulmonary nodule or mass. Unchanged small left pleural effusion, likely chronic given associated pleural wall thickening.    Unchanged cardiomegaly. Dense atherosclerotic calcifications of the coronary arteries and aortic valve. There are also dense thoracic aortic calcifications. Trace pericardial effusion. No mediastinal mass or threshold lymphadenopathy.    Chest wall soft tissues show no acute abnormality. Gynecomastia. No evidence of chest wall fracture or suspicious osseous lesion.    CT ABDOMEN/PELVIS:  Cirrhotic morphology of the liver. No suspicious hepatic lesion.    The gallbladder is unremarkable. No significant biliary ductal dilation.    The spleen, pancreas, and bilateral adrenal glands show no acute abnormality.    There is a small right superior pole renal cyst, unchanged. Bilateral renal parenchymal scarring/thinning. No hydronephrosis or nephrolithiasis. No suspicious renal lesion.    Small hiatal hernia. Thickening of the gastric wall which may be secondary to under distention. There is no evidence of bowel obstruction. Colonic diverticulosis without definite evidence of diverticulitis. Appendix is not well visualized and may be   surgically absent or decompressed.    There is no evidence of suspicious lymphadenopathy. There are shotty retroperitoneal and mesenteric lymph nodes which are unchanged from January 2024  PET/CT. There is moderate abdominopelvic ascites with autumn mesentery. No evidence of abdominal aortic   aneurysm. Dense atherosclerotic calcifications of the aorta and branch vessels.    The urinary bladder and prostate is difficult to evaluate given adjacent metallic artifact, however is grossly unremarkable.    The abdominal and pelvic wall soft tissues show no acute abnormality. There is mild diffuse fatty muscle atrophy without discrete fluid collection.    Bilateral hip arthroplasties. Cerclage fixation of the right intertrochanteric region. Chronic compression deformity of L1 vertebral body. No evidence of acute fracture or suspicious bony lesion.      Impression:      Impression:  CT HEAD: No acute intracranial abnormality. Chronic sequela of probable small vessel ischemic disease and severe global parenchymal volume loss.    CT CHEST: No acute abnormality in the chest. Bilateral hypoventilatory changes with round atelectasis in the left lung base. Chronic adjacent small left pleural effusion with pleural wall thickening, not significantly changed from December 2023 chest CT.   Chronic ancillary findings as above.    CT ABDOMEN/PELVIS: No acute abnormality in the abdomen or pelvis. Cirrhosis with moderate abdominopelvic ascites. Chronic ancillary findings as above.        Electronically Signed: Kenroy Mcneil MD    4/27/2024 3:30 PM EDT    Workstation ID: [id number]    CT Head Without Contrast [[id number]] Collected: 04/27/24 1520     Updated: 04/27/24 1533    Narrative:      CT HEAD WO CONTRAST, CT CHEST WO CONTRAST DIAGNOSTIC, CT ABDOMEN PELVIS WO CONTRAST    Date of Exam: 4/27/2024 2:51 PM EDT    Indication: AMS, confusion.    Comparison: 12/4/2023 chest CT and 1/3/2024 PET/CT    Technique: Axial CT images were obtained of the head, chest, abdomen, and pelvis without contrast administration.  Automated exposure control and iterative construction methods were used.      Findings:  CT HEAD:  There is no evidence  of acute intracranial hemorrhage, mass, midline shift, loss of gray-white matter differentiation, or extra-axial fluid collection. Subcortical periventricular white matter hypodensities are present consistent with small vessel   ischemic disease. There is extensive global parenchymal volume loss with ex vacuo dilation of the ventricular system. The basal cisterns are patent.    There is no fracture or suspicious osseous lesion. Mild mucosal thickening of the bilateral maxillary sinuses. Partial mastoid air cell effusions. Bilateral ocular surgery. Soft tissues are unremarkable.    CT CHEST:  The central airways are patent. There are hypoventilatory changes of the lung bases, left greater than right, with associated left lower lobe rounded atelectasis. This is unchanged from recent PET/CT. There is no new focal airspace consolidation. No new   suspicious pulmonary nodule or mass. Unchanged small left pleural effusion, likely chronic given associated pleural wall thickening.    Unchanged cardiomegaly. Dense atherosclerotic calcifications of the coronary arteries and aortic valve. There are also dense thoracic aortic calcifications. Trace pericardial effusion. No mediastinal mass or threshold lymphadenopathy.    Chest wall soft tissues show no acute abnormality. Gynecomastia. No evidence of chest wall fracture or suspicious osseous lesion.    CT ABDOMEN/PELVIS:  Cirrhotic morphology of the liver. No suspicious hepatic lesion.    The gallbladder is unremarkable. No significant biliary ductal dilation.    The spleen, pancreas, and bilateral adrenal glands show no acute abnormality.    There is a small right superior pole renal cyst, unchanged. Bilateral renal parenchymal scarring/thinning. No hydronephrosis or nephrolithiasis. No suspicious renal lesion.    Small hiatal hernia. Thickening of the gastric wall which may be secondary to under distention. There is no evidence of bowel obstruction. Colonic diverticulosis  without definite evidence of diverticulitis. Appendix is not well visualized and may be   surgically absent or decompressed.    There is no evidence of suspicious lymphadenopathy. There are shotty retroperitoneal and mesenteric lymph nodes which are unchanged from January 2024 PET/CT. There is moderate abdominopelvic ascites with autumn mesentery. No evidence of abdominal aortic   aneurysm. Dense atherosclerotic calcifications of the aorta and branch vessels.    The urinary bladder and prostate is difficult to evaluate given adjacent metallic artifact, however is grossly unremarkable.    The abdominal and pelvic wall soft tissues show no acute abnormality. There is mild diffuse fatty muscle atrophy without discrete fluid collection.    Bilateral hip arthroplasties. Cerclage fixation of the right intertrochanteric region. Chronic compression deformity of L1 vertebral body. No evidence of acute fracture or suspicious bony lesion.      Impression:      Impression:  CT HEAD: No acute intracranial abnormality. Chronic sequela of probable small vessel ischemic disease and severe global parenchymal volume loss.    CT CHEST: No acute abnormality in the chest. Bilateral hypoventilatory changes with round atelectasis in the left lung base. Chronic adjacent small left pleural effusion with pleural wall thickening, not significantly changed from December 2023 chest CT.   Chronic ancillary findings as above.    CT ABDOMEN/PELVIS: No acute abnormality in the abdomen or pelvis. Cirrhosis with moderate abdominopelvic ascites. Chronic ancillary findings as above.        Electronically Signed: Kenroy Mcneil MD    4/27/2024 3:30 PM EDT    Workstation ID: [id number]    XR Chest 1 View [[id number]] Collected: 04/27/24 1319     Updated: 04/27/24 1327    Narrative:      XR CHEST 1 VW    Date of Exam: 4/27/2024 12:50 PM EDT    Indication: Weak/Dizzy/AMS triage protocol    Comparison: PET/CT January 3, 2024, chest CT December 4, 2023, chest  radiographs November 15, 2023.    Findings:  Left mid and lower lung pleural and parenchymal opacities appear similar to prior exams. There is a suspected small left pleural effusion component. No right lung infiltrate or pleural effusion. No significant pneumothorax component is identified. Heart   size appears within normal limits. There is atherosclerosis of the aorta.      Impression:      Impression:  1.Left mid and lower lung pleural and parenchymal opacities appear grossly similar to recent prior studies.  2.No definite radiographic findings of acute cardiopulmonary abnormality.      Electronically Signed: Samir Sanchez    4/27/2024 1:24 PM EDT    Workstation ID: [id number]                  Diagnostics: Reviewed    A:   Metabolic encephalopathy    Type 2 diabetes mellitus without complication, without long-term current use of insulin    Primary hypertension    Atrial flutter with controlled response    Chronic anticoagulation    PVD (peripheral vascular disease)    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 2 diabetes mellitus    Cirrhosis of liver    Hx-TIA (transient ischemic attack)    CHF (no ECHO on file currently)    Elevated serum creatinine    CKD (chronic kidney disease) stage 3, GFR 30-59 ml/min    Hypomagnesemia     89 y.o. male with cirrhosis of liver, metabolic encephalopathy, T2DM, CKD III, atrial flutter.   S/S:   AMS -metabolic encephalopathy  -continue rifaximin 550mg PO BID  -continue lactulose 10g oral daily    2. Debility -PT/OT following    3. GOC -DNR/DNI/Multiple limitations -per discussion with family  -met with wife, son, and daughter-in-law  -reviewed options for ongoing full treatment versus comfort focused plan of care with hospice services  -family is considering home with hospice services, hospice liaison to follow up on Monday    P: Introduced Palliative Care and services to patient and family. Met with family to discuss GOC. Reviewed ongoing full treatment versus comfort  focused plan of care with hospice services. Son reports that they would like to limit hospitalizations and instead focus on symptom management at home throughout end of life. They are in agreement with having hospice liaison follow up on Monday to discuss services and if they are in agreement, set up discharge/DME, etc. They are interested in drain placement prior to discharge if they are going home with hospice. All questions and concerns addressed. Hospitalist updated on [facility].     Thank you for this consult and allowing us to participate in patient's plan of care. Palliative Care Team will continue to follow patient. Please do not hesitate to contact us regarding further symptom management or goals of care needs.  Time: 60 minutes spent reviewing medical and medication records, assessing and examining patient, discussing with family, answering questions, providing some guidance about a plan and documentation of care, and coordinating care with other healthcare members, with > 50% time spent face to face.         EMMETT Ybarra  4/28/2024      Electronically signed by Candi Yi APRN at 04/28/24 3682

## 2024-04-29 NOTE — NURSING NOTE
WOC consulted for diabetic ulcers on feet.    Patient has chronic wounds on his bilateral feet. left >right.    Wounds appear arterial in nature.  However suspect diabetic component as well.  All wounds were cleansed, painted with Betadine and covered with an Allevyn foam dressing.  Scant bleeding noted from small area on left great toe.  Wound beds of all wounds are very dry with some having black eschar.  Otherwise stable.  Because of patient's age and underlying comorbidities these wounds may never heal despite nutrition and local wound care.            Pressure injury prevention during hospitalization.  Please apply Allevyn foam dressing to sacrum and heels.    Sign off.    Kristian Faulkner RN, BSN, CCRN, CWOCN  Wound, Ostomy and Continence (WOC) Department  [facility]

## 2024-04-29 NOTE — PROGRESS NOTES
[facility]  PROGRESS NOTE    Patient Name: Onel Clark  : 3/25/1933  MRN: [id number]    Date of Admission: 2024  Primary Care Physician: Ilir Fair MD    Subjective   Subjective     CC:  Confusion    HPI:  Had family meeting with patient's family and hospice with bedside RN present.  Please see advance care planning note.  Patient is resting comfortably in bed required Varner for urinary retention.      Objective   Objective     Vital Signs:   Temp:  [97 °F (36.1 °C)-98 °F (36.7 °C)] 97 °F (36.1 °C)  Heart Rate:  [56-91] 66  Resp:  [18-20] 18  BP: (109-144)/(31-88) 124/76     Physical Exam:  Constitutional: No acute distress, awake, alert  HENT: NCAT, mucous membranes moist  Respiratory: Clear to auscultation bilaterally, respiratory effort normal   Cardiovascular: irregular rate, systolic murmur, rubs, or gallops  Gastrointestinal: Positive bowel sounds, soft, nontender, nondistended  Musculoskeletal: No bilateral ankle edema  Psychiatric: Appropriate affect, cooperative  Neurologic: Oriented x 2 self and time, does know city but not state ,speech clear, globally weak. Does have some confusion.   Skin: No rashes    Results Reviewed:  LAB RESULTS:      Lab 24  0720 24  0906 24  1538 24  1257   WBC 13.80* 15.00*  --  15.76*   HEMOGLOBIN 9.1* 10.2*  --  10.5*   HEMATOCRIT 27.5* 30.9*  --  32.0*   PLATELETS 321 335  --  352   NEUTROS ABS  --  12.38*  --  12.54*   IMMATURE GRANS (ABS)  --  0.06*  --  0.05   LYMPHS ABS  --  1.10  --  1.88   MONOS ABS  --  1.41*  --  1.24*   EOS ABS  --  0.00  --  0.00   MCV 87.3 87.5  --  87.9   CRP  --   --  19.16*  --    PROCALCITONIN  --   --   --  1.52*   LACTATE  --   --   --  1.8         Lab 24  0720 24  0440 24  1755 24  1538 24  1257   SODIUM 139 139 139  --  138   POTASSIUM 4.4 4.2 4.6  --  4.8   CHLORIDE 102 103 103  --  102   CO2 21.0* 24.0 23.0  --  24.0  "  ANION GAP 16.0* 12.0 13.0  --  12.0   BUN 51* 49* 51*  --  51*   CREATININE 2.39* 2.18* 2.09*  --  2.25*   EGFR 25.0* 27.9* 29.3*  --  26.9*   GLUCOSE 137* 165* 124*  --  134*   CALCIUM 8.6 8.6 8.6  --  8.8   MAGNESIUM 2.0 2.5* 1.5*  --  1.5*   TSH  --   --   --  2.610  --          Lab 04/27/24  1257   TOTAL PROTEIN 6.0   ALBUMIN 3.0*   GLOBULIN 3.0   ALT (SGPT) 9   AST (SGOT) 18   BILIRUBIN 1.0   ALK PHOS 222*         Lab 04/27/24  1538 04/27/24  1257   HSTROP T 134* 134*                 Brief Urine Lab Results  (Last result in the past 365 days)        Color   Clarity   Blood   Leuk Est   Nitrite   Protein   CREAT   Urine HCG        04/28/24 1814             124.4                 Cultures:  No results found for: \"BLOODCX\", \"URINECX\", \"WOUNDCX\", \"MRSACX\", \"RESPCX\", \"STOOLCX\"    Microbiology Results Abnormal       Procedure Component Value - Date/Time    Blood Culture - Blood, Arm, Right [[id number]]  (Normal) Collected: 04/27/24 1540    Lab Status: Preliminary result Specimen: Blood from Arm, Right Updated: 04/29/24 1700     Blood Culture No growth at 2 days    MRSA Screen, PCR (Inpatient) - Swab, Nares [[id number]]  (Normal) Collected: 04/27/24 1401    Lab Status: Final result Specimen: Swab from Nares Updated: 04/27/24 1522     MRSA PCR Negative    Narrative:      The negative predictive value of this diagnostic test is high and should only be used to consider de-escalating anti-MRSA therapy. A positive result may indicate colonization with MRSA and must be correlated clinically.  MRSA Negative            No radiology results from the last 24 hrs        Current medications:  Scheduled Meds:amLODIPine, 5 mg, Oral, Daily  apixaban, 2.5 mg, Oral, BID  aspirin, 81 mg, Oral, Daily  atorvastatin, 80 mg, Oral, Daily  lactulose, 10 g, Oral, Daily  levothyroxine, 137 mcg, Oral, QAM  memantine, 5 mg, Oral, BID  midodrine, 5 mg, Oral, TID AC  multivitamin with minerals, 1 tablet, Oral, Daily  pantoprazole, 40 mg, " Intravenous, Q AM  piperacillin-tazobactam, 3.375 g, Intravenous, Q8H  rifAXIMin, 550 mg, Oral, Q12H  senna-docusate sodium, 2 tablet, Oral, BID  sodium chloride, 10 mL, Intravenous, Q12H      Continuous Infusions:Pharmacy to dose vancomycin,       PRN Meds:.  acetaminophen    senna-docusate sodium **AND** polyethylene glycol **AND** bisacodyl **AND** bisacodyl    Calcium Replacement - Follow Nurse / BPA Driven Protocol    dextrose    dextrose    glucagon (human recombinant)    haloperidol lactate    Magnesium Standard Dose Replacement - Follow Nurse / BPA Driven Protocol    Morphine **AND** naloxone    nitroglycerin    ondansetron    Pharmacy to dose vancomycin    Phosphorus Replacement - Follow Nurse / BPA Driven Protocol    Potassium Replacement - Follow Nurse / BPA Driven Protocol    QUEtiapine    sodium chloride    sodium chloride    sodium chloride    Assessment & Plan   Assessment & Plan     Active Hospital Problems    Diagnosis  POA    **Metabolic encephalopathy [G93.41]  Yes    Chronic anticoagulation [Z79.01]  Not Applicable    PVD (peripheral vascular disease) [I73.9]  Yes    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        Brief Hospital Course to date:  Metabolic encephalopathy    Type 2 diabetes mellitus without complication, without long-term current use of insulin    Primary hypertension    Atrial  flutter with controlled response    Chronic anticoagulation    PVD (peripheral vascular disease)    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 2 diabetes mellitus    Cirrhosis of liver    Hx-TIA (transient ischemic attack)    CHF (no ECHO on file currently)    Elevated serum creatinine    CKD (chronic kidney disease) stage 3, GFR 30-59 ml/min    Hypomagnesemia     Metabolic encephalopathy  - improved  - ammonia = normal   - no overt evidence of infectious source but has elevated CRP and WBC's  - was on outpt PO Doxy for left heel & toe diabetic ulcer but currently does not appear infected  - continue empiric Zosyn and Vanc started in ED however low threshold to convert back to his PO Doxy after [facility] evals wounds and documents  - hx of Aflutter, MRI brain ordered to rule out embolic dz given vague confusion presentation to rule out embolic phenomena however family declined MRI as they are contemplating on hospice care 04/29  -blood cx pending however NGTD. MRSA screen negative   -Addendum: 1/2 blood cx positive .Likely contaminant     CHICO on CKD Stage III, concern for hepatorenal syndrome   - moderate ascites  - s/p 25g albumin IV with 1L LR over 10 hours on admission   - appreciated nephrology recommendations     Aortic Stenosis  -declined for TAVR by [employer] [facility]     Cirrhosis  - has needed outpt paracentesis more often  - currently abd soft without dyspnea, moderate ascites noted on imaging  - follows with [facility]  - family interested in standing outpt paracentesis orders to decrease difficulty keeping patient hepatically homeostatic    -04/29 :plan for palliative drain 04/30    Diabetic left foot wound 1st toe and heel   - noninfected appearing  - continue dry gauze wraps  - [facility]     NIDDM2  - LDSSI     PVD  Hx of TIAs  HTN  HL  Aflutter on chronic anticoagulation  -continue Eliquis  - not on rate medications per home meds  - continue ASA 81mg and Lipitor 80mg      Senile cognitive impairment  At risk for  hospital delirium  - takes Namenda at home   - delirium mitigation pathway enacted      GO: Please see ACP note. Plan for palliative peritoneal drain tomorrow morning then home with hospice. Patient is on comfort care with exception of antibiotics.     Total time spent: 40 minutes with greater than 50% of the time in counseling with family and coordination of care with sub specialists and nursing staff.     Expected Discharge Location and Transportation: TBD  Expected Discharge TBD  Expected Discharge Date: 4/30/2024; Expected Discharge Time:      DVT prophylaxis:  Medical DVT prophylaxis orders are present.         AM-PAC 6 Clicks Score (PT): 12 (04/28/24 2100)    CODE STATUS:   Code Status and Medical Interventions:   Ordered at: 04/29/24 1142     Level Of Support Discussed With:    Next of Kin (If No Surrogate)     Code Status (Patient has no pulse and is not breathing):    No CPR (Do Not Attempt to Resuscitate)     Medical Interventions (Patient has pulse or is breathing):    Comfort Measures     Comments:    exception for antibiotics       Lola Rodrigues MD  04/29/24

## 2024-04-29 NOTE — TELEPHONE ENCOUNTER
Caller: [facility] - HOSPICE CARE    Relationship to patient:     Best call back number: [phone] OPTION 2     Patient is needing: [facility]RIAM IS CALLING TO GET CONFIRMATION THAT MD SYED WILL FOLLOW THE PATIENT AS HE GOES INTO HOSPICE CARE.     PLEASE CALL BACK WHEN POSSIBLE.

## 2024-04-29 NOTE — CONSULTS
[facility]   HISTORY AND PHYSICAL    Patient Name: Onel Clark  : 3/25/1933  MRN: [id number]  Primary Care Physician:  Ilir Fair MD  Date of admission: 2024    Subjective   Subjective     Chief Complaint: Urinary retention/Difficult monroy placement     HPI:  I am seeing Onel Clark at the request of the medicine service for evaluation of urinary retention/difficult Monroy catheter placement.  He is a 89-year-old gentleman with multiple medical issues including cirrhosis, getting periodic outpatient paracentesis, diabetes, history of TIA, atrial flutter on chronic anticoagulation, PVD, CKD 3, and baseline chronic debility related to advanced age.  Patient had presented to [facility] ED on 2024 with altered mental status and fatigue.  He was subsequently admitted to the medicine service for metabolic encephalopathy, and also found to have a diabetic left foot wound.  Per nursing, urine output has been monitored with condom catheter.  They have noticed a decreased urine output and elevated bladder scans, though unclear if these bladder scans were performed after paracentesis.  Do not find recording of bladder scan results in chart.  Nursing attempted to straight cath without success.  Urology was consulted for difficult catheter placement.    Patient appears to be a poor historian, no family is present.  History gleaned from chart and bedside nurse.  It appears patient has seen a urologist in [place] in the past for BPH.  Does not appear he is ever had any urologic procedures performed.  Unknown if he had prior issues with acute urinary retention requiring urgent catheterization.    Review of Systems   Review of systems could not be obtained due to   patient confusion.    Personal History     Past Medical History:   Diagnosis Date    Acquired hypothyroidism     Allergic     Allergies     Arthritis     Benign prostatic hyperplasia with nocturia     Bilateral carotid artery  disease     Cancer of the skin, basal cell     Cataract     CHF (congestive heart failure)     Chronic non-seasonal allergic rhinitis     DUE TO POLLEN    Chronic renal impairment     Cirrhosis of liver 04/27/2024    CKD (chronic kidney disease) stage 3, GFR 30-59 ml/min 04/27/2024    COPD (chronic obstructive pulmonary disease)     Coronary artery disease     Decreased hearing of both ears     Degenerative lumbar spinal stenosis     Diabetes mellitus     Elevated PSA     Frequent falls     GERD without esophagitis     Heart murmur     High risk medication use     HL (hearing loss)     Hx of bilateral hip replacements     Hx of decompressive lumbar laminectomy     Hx of total knee replacement, bilateral     Hx of transient ischemic attack (TIA)     Hypertension     Mixed hyperlipidemia due to type 2 diabetes mellitus     Nicotine dependence     No natural teeth     FALSE TEETH    Osteoarthritis     Primary hypertension     Primary osteoarthritis involving multiple joints     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 2 diabetes mellitus     PVD (peripheral vascular disease) 04/27/2024    Stroke TIA  2 years ago    Thyroid disease     TIA (transient ischemic attack)     Type 2 diabetes mellitus with diabetic polyneuropathy     Type 2 diabetes mellitus with stage 2 chronic kidney disease, without long-term current use of insulin        Past Surgical History:   Procedure Laterality Date    BACK SURGERY  2007    Lumbar Laminectomy    CARPAL TUNNEL RELEASE  2012    COLONOSCOPY      EYE SURGERY  cataracts    JOINT REPLACEMENT  both hips & knee & back    REPLACEMENT TOTAL KNEE Left 2013    SPINE SURGERY  about 15 yrs ago    TOTAL HIP ARTHROPLASTY Right 2016       Family History: family history includes Alzheimer's disease in his mother; Arthritis in his brother and sister; Coronary artery disease in his father; Diabetes in his brother, mother, and sister; Hearing loss in his brother and father; Heart attack in his father; Hyperlipidemia in  his father, mother, and sister; Hypertension in his sister; Stroke in his father. Otherwise pertinent FHx was reviewed and not pertinent to current issue.    Social History:  reports that he quit smoking about 24 years ago. His smoking use included cigarettes. He started smoking about 34 years ago. He has a 10 pack-year smoking history. He has been exposed to tobacco smoke. He has never used smokeless tobacco. He reports current alcohol use of about 1.0 standard drink of alcohol per week. He reports that he does not use drugs.    Home Medications:  Aspirin, Calcium Carb-Cholecalciferol, Cetirizine HCl, Magnesium, amLODIPine, apixaban, atorvastatin, bumetanide, levothyroxine, losartan, meloxicam, memantine, metFORMIN, multivitamin with minerals, omeprazole, and vitamin C      Allergies:  No Known Allergies    Objective   Objective     Vitals:   Temp:  [97.7 °F (36.5 °C)-98 °F (36.7 °C)] 98 °F (36.7 °C)  Heart Rate:  [56-91] 77  Resp:  [18-20] 18  BP: (109-144)/(31-88) 114/63  Physical Exam    Constitutional: Awake in bed, frail-appearing   Eyes: PERRLA, sclerae anicteric, no conjunctival injection   HENT: Normocephalic, atraumatic, mucous membranes moist   Neck: Supple, trachea midline   Respiratory:Equal chest rise, non-labored respirations    Cardiovascular: RRR, palpable radial pulses bilaterally   Gastrointestinal: Soft, nontender, non-distended,    Musculoskeletal: No bilateral ankle edema, no clubbing or cyanosis to extremities   Genitourinary: Unircumcised phallus, orthotopic meatus, bilaterally descended testicles palpable without masses   Psychiatric: Appropriate affect, cooperative   Neurologic: Cranial Nerves grossly intact   Skin: No rashes     Result Review    Result Review:  I have personally reviewed the results from the time of this admission to 4/29/2024 11:27 EDT and agree with these findings:  [x]  Laboratory  [x]  Microbiology  []  Radiology  []  EKG/Telemetry   []  Cardiology/Vascular   []   Pathology  []  Old records  []  Other:    Most notable findings include:   Cr:  2.39  WBC: 13.80      Assessment & Plan   Assessment / Plan     Brief Patient Summary:  Onel Clark is a 89 y.o. male with multiple medical issues including cirrhosis, getting periodic outpatient paracentesis, diabetes, history of TIA, atrial flutter on chronic anticoagulation, PVD, CKD 3, and baseline chronic debility related to advanced age, currently admitted to [facility] with concerns for metabolic encephalopathy.  Urology consulted for difficult catheter placement.    Genitourinary area was prepped and draped in the normal sterile fashion.  An 18 Macanese coudé catheter was smoothly inserted, though there was some resistance of the prostatic urethra.  It was immediate withdrawal of approximately 200 cc of clear yellow urine.  Balloon was inflated with 10 mL of sterile water.  Catheter was placed to gravity drainage.  Patient tolerated procedure well.  There were no immediate complications.    Active Hospital Problems:  Active Hospital Problems    Diagnosis     **Metabolic encephalopathy     Chronic anticoagulation     PVD (peripheral vascular disease)     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 2 diabetes mellitus     Cirrhosis of liver     Hx-TIA (transient ischemic attack)     CHF (no ECHO on file currently)     Elevated serum creatinine     CKD (chronic kidney disease) stage 3, GFR 30-59 ml/min     Hypomagnesemia     Atrial flutter with controlled response     Type 2 diabetes mellitus without complication, without long-term current use of insulin     Primary hypertension      Plan:   -Routine catheter care  -Voiding trial per primary team  -Recommend starting Flomax if okay with prior primary team  -Follow-up with his outpatient urologist upon discharge  -All other plans per primary and other consulting teams  -Will follow peripherally going forward.  Please page with questions or concerns.    DVT  prophylaxis:  Medical DVT prophylaxis orders are present.      CODE STATUS:    Medical Intervention Limits: NO intubation (DNI); NO cardioversion; NO antiarrhythmic drugs; NO dialysis; NO artificial nutrition  Code Status (Patient has no pulse and is not breathing): No CPR (Do Not Attempt to Resuscitate)  Medical Interventions (Patient has pulse or is breathing): Limited Support    Dispo: Per primary team    Electronically signed by Blaise Santiago PA-C, 04/29/24, 11:27 AM EDT.

## 2024-04-29 NOTE — H&P (VIEW-ONLY)
[facility] Medicine Services  PROGRESS NOTE    Patient Name: Onel Clark  : 3/25/1933  MRN: [id number]    Date of Admission: 2024  Primary Care Physician: Ilir Fair MD    Subjective   Subjective     CC:  Confusion    HPI:  Had family meeting with patient's family and hospice with bedside RN present.  Please see advance care planning note.  Patient is resting comfortably in bed required Varner for urinary retention.      Objective   Objective     Vital Signs:   Temp:  [97 °F (36.1 °C)-98 °F (36.7 °C)] 97 °F (36.1 °C)  Heart Rate:  [56-91] 66  Resp:  [18-20] 18  BP: (109-144)/(31-88) 124/76     Physical Exam:  Constitutional: No acute distress, awake, alert  HENT: NCAT, mucous membranes moist  Respiratory: Clear to auscultation bilaterally, respiratory effort normal   Cardiovascular: irregular rate, systolic murmur, rubs, or gallops  Gastrointestinal: Positive bowel sounds, soft, nontender, nondistended  Musculoskeletal: No bilateral ankle edema  Psychiatric: Appropriate affect, cooperative  Neurologic: Oriented x 2 self and time, does know city but not state ,speech clear, globally weak. Does have some confusion.   Skin: No rashes    Results Reviewed:  LAB RESULTS:      Lab 24  0720 24  0906 24  1538 24  1257   WBC 13.80* 15.00*  --  15.76*   HEMOGLOBIN 9.1* 10.2*  --  10.5*   HEMATOCRIT 27.5* 30.9*  --  32.0*   PLATELETS 321 335  --  352   NEUTROS ABS  --  12.38*  --  12.54*   IMMATURE GRANS (ABS)  --  0.06*  --  0.05   LYMPHS ABS  --  1.10  --  1.88   MONOS ABS  --  1.41*  --  1.24*   EOS ABS  --  0.00  --  0.00   MCV 87.3 87.5  --  87.9   CRP  --   --  19.16*  --    PROCALCITONIN  --   --   --  1.52*   LACTATE  --   --   --  1.8         Lab 24  0720 24  0440 24  1755 24  1538 24  1257   SODIUM 139 139 139  --  138   POTASSIUM 4.4 4.2 4.6  --  4.8   CHLORIDE 102 103 103  --  102   CO2 21.0* 24.0 23.0  --  24.0  "  ANION GAP 16.0* 12.0 13.0  --  12.0   BUN 51* 49* 51*  --  51*   CREATININE 2.39* 2.18* 2.09*  --  2.25*   EGFR 25.0* 27.9* 29.3*  --  26.9*   GLUCOSE 137* 165* 124*  --  134*   CALCIUM 8.6 8.6 8.6  --  8.8   MAGNESIUM 2.0 2.5* 1.5*  --  1.5*   TSH  --   --   --  2.610  --          Lab 04/27/24  1257   TOTAL PROTEIN 6.0   ALBUMIN 3.0*   GLOBULIN 3.0   ALT (SGPT) 9   AST (SGOT) 18   BILIRUBIN 1.0   ALK PHOS 222*         Lab 04/27/24  1538 04/27/24  1257   HSTROP T 134* 134*                 Brief Urine Lab Results  (Last result in the past 365 days)        Color   Clarity   Blood   Leuk Est   Nitrite   Protein   CREAT   Urine HCG        04/28/24 1814             124.4                 Cultures:  No results found for: \"BLOODCX\", \"URINECX\", \"WOUNDCX\", \"MRSACX\", \"RESPCX\", \"STOOLCX\"    Microbiology Results Abnormal       Procedure Component Value - Date/Time    Blood Culture - Blood, Arm, Right [[id number]]  (Normal) Collected: 04/27/24 1540    Lab Status: Preliminary result Specimen: Blood from Arm, Right Updated: 04/29/24 1700     Blood Culture No growth at 2 days    MRSA Screen, PCR (Inpatient) - Swab, Nares [[id number]]  (Normal) Collected: 04/27/24 1401    Lab Status: Final result Specimen: Swab from Nares Updated: 04/27/24 1522     MRSA PCR Negative    Narrative:      The negative predictive value of this diagnostic test is high and should only be used to consider de-escalating anti-MRSA therapy. A positive result may indicate colonization with MRSA and must be correlated clinically.  MRSA Negative            No radiology results from the last 24 hrs        Current medications:  Scheduled Meds:amLODIPine, 5 mg, Oral, Daily  apixaban, 2.5 mg, Oral, BID  aspirin, 81 mg, Oral, Daily  atorvastatin, 80 mg, Oral, Daily  lactulose, 10 g, Oral, Daily  levothyroxine, 137 mcg, Oral, QAM  memantine, 5 mg, Oral, BID  midodrine, 5 mg, Oral, TID AC  multivitamin with minerals, 1 tablet, Oral, Daily  pantoprazole, 40 mg, " Intravenous, Q AM  piperacillin-tazobactam, 3.375 g, Intravenous, Q8H  rifAXIMin, 550 mg, Oral, Q12H  senna-docusate sodium, 2 tablet, Oral, BID  sodium chloride, 10 mL, Intravenous, Q12H      Continuous Infusions:Pharmacy to dose vancomycin,       PRN Meds:.  acetaminophen    senna-docusate sodium **AND** polyethylene glycol **AND** bisacodyl **AND** bisacodyl    Calcium Replacement - Follow Nurse / BPA Driven Protocol    dextrose    dextrose    glucagon (human recombinant)    haloperidol lactate    Magnesium Standard Dose Replacement - Follow Nurse / BPA Driven Protocol    Morphine **AND** naloxone    nitroglycerin    ondansetron    Pharmacy to dose vancomycin    Phosphorus Replacement - Follow Nurse / BPA Driven Protocol    Potassium Replacement - Follow Nurse / BPA Driven Protocol    QUEtiapine    sodium chloride    sodium chloride    sodium chloride    Assessment & Plan   Assessment & Plan     Active Hospital Problems    Diagnosis  POA    **Metabolic encephalopathy [G93.41]  Yes    Chronic anticoagulation [Z79.01]  Not Applicable    PVD (peripheral vascular disease) [I73.9]  Yes    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        Brief Hospital Course to date:  Metabolic encephalopathy    Type 2 diabetes mellitus without complication, without long-term current use of insulin    Primary hypertension    Atrial  flutter with controlled response    Chronic anticoagulation    PVD (peripheral vascular disease)    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 2 diabetes mellitus    Cirrhosis of liver    Hx-TIA (transient ischemic attack)    CHF (no ECHO on file currently)    Elevated serum creatinine    CKD (chronic kidney disease) stage 3, GFR 30-59 ml/min    Hypomagnesemia     Metabolic encephalopathy  - improved  - ammonia = normal   - no overt evidence of infectious source but has elevated CRP and WBC's  - was on outpt PO Doxy for left heel & toe diabetic ulcer but currently does not appear infected  - continue empiric Zosyn and Vanc started in ED however low threshold to convert back to his PO Doxy after [facility] evals wounds and documents  - hx of Aflutter, MRI brain ordered to rule out embolic dz given vague confusion presentation to rule out embolic phenomena however family declined MRI as they are contemplating on hospice care 04/29  -blood cx pending however NGTD. MRSA screen negative   -Addendum: 1/2 blood cx positive .Likely contaminant     CHICO on CKD Stage III, concern for hepatorenal syndrome   - moderate ascites  - s/p 25g albumin IV with 1L LR over 10 hours on admission   - appreciated nephrology recommendations     Aortic Stenosis  -declined for TAVR by [employer] ELENA     Cirrhosis  - has needed outpt paracentesis more often  - currently abd soft without dyspnea, moderate ascites noted on imaging  - follows with [facility]  - family interested in standing outpt paracentesis orders to decrease difficulty keeping patient hepatically homeostatic    -04/29 :plan for palliative drain 04/30    Diabetic left foot wound 1st toe and heel   - noninfected appearing  - continue dry gauze wraps  - [facility]     NIDDM2  - LDSSI     PVD  Hx of TIAs  HTN  HL  Aflutter on chronic anticoagulation  -continue Eliquis  - not on rate medications per home meds  - continue ASA 81mg and Lipitor 80mg      Senile cognitive impairment  At risk for  hospital delirium  - takes Namenda at home   - delirium mitigation pathway enacted      GO: Please see ACP note. Plan for palliative peritoneal drain tomorrow morning then home with hospice. Patient is on comfort care with exception of antibiotics.     Total time spent: 40 minutes with greater than 50% of the time in counseling with family and coordination of care with sub specialists and nursing staff.     Expected Discharge Location and Transportation: TBD  Expected Discharge TBD  Expected Discharge Date: 4/30/2024; Expected Discharge Time:      DVT prophylaxis:  Medical DVT prophylaxis orders are present.         AM-PAC 6 Clicks Score (PT): 12 (04/28/24 2100)    CODE STATUS:   Code Status and Medical Interventions:   Ordered at: 04/29/24 1140     Level Of Support Discussed With:    Next of Kin (If No Surrogate)     Code Status (Patient has no pulse and is not breathing):    No CPR (Do Not Attempt to Resuscitate)     Medical Interventions (Patient has pulse or is breathing):    Comfort Measures     Comments:    exception for antibiotics       Lola Rodrigues MD  04/29/24

## 2024-04-29 NOTE — ACP (ADVANCE CARE PLANNING)
"Had a family meeting with bedside RN, Norman Palumbo \"Jovany\", patient's wife, son, and daughter along with hospice RN, Ana Cristina. I discussed labs today and renal function not improving. In addition, patient developing urinary retention and poor oral intake. Family wishes to bring patient home on hospice tomorrow after palliative peritoneal drain is placed. While patient remains in the hospital, family wishes for comfort care with the exception of antibiotics. They wish to continue only with oral antibiotics that are sensitive to wound culture that grew at [facility] and no escalation of Abx. Family is aware of positive 1/2 blood cx and how this most likely is contaminant however they express understanding this could be bactremia which could be fatal. Again, family wishes to proceed with comfort measures with above stated oral antibiotics, no labs, no imaging, no escalation of care (no MEWs/no RR/no transfer to ICU), and comfort feeds. They do not wish to continue with Albumin. Urology has been consulted to help place palliative monroy and IR made aware. Plan is to d/c home with hospice tomorrow afternoon. I have left message for [facility] Wound care. Will continue with IV abx until cultures can be obtained and will transition to above plan.     Addendum: Able to reach Wound care clinic at [phone] and spoke with Ann-Marie Avina RN. Culture is growing Pseudomonas. However will need sensitivities. Spoke with our pharmacy, will continue Zosyn and will call back Wound care clinic tomorrow.   "

## 2024-04-29 NOTE — PLAN OF CARE
"Goal Outcome Evaluation:  Plan of Care Reviewed With: patient        Progress: no change  Outcome Evaluation: Pt. seen by palliative RN on 4/29 at 1237.  New palliative consult for assistance with GOC per Dr. Freeman.  Onel Clark and Brenda Clark named as POAs.  No other ACP documents on file.  EMMETT Mullen saw pt for initial provider consult on 4/28.  Pt. was sitting up in bed on RA.  He denied pain so long as he \"holds still\".   With movement or care he endorses bilateral foot pain from diabetic foot ulcers.He was working on lunch tray and was eating a little with encouragement from his wife.  The plan is for pt to go home in [place] with hospice services tomorrow after he gets his drain placed.  Pt. is now comfort measures.  Palliative care to follow along for support and ongoing POC until discharge.      Problem: Palliative Care  Goal: Enhanced Quality of Life  Intervention: Promote Advance Care Planning  Flowsheets (Taken 4/29/2024 1422)  Life Transition/Adjustment:   palliative care initiated   palliative care discussed        1300 Palliative IDT meeting: TIFFANI Webb RN, CHLEONID; EMMETT Mullen; BENNY Bahena RN, JES; BENNY Finley MDiv, [facility] ; VALENCIA Freeman [facility], [facility]; VALENCIA Lozano MD      After hours, weekends and holidays, contact Palliative Provider by calling [phone].                              "

## 2024-04-30 ENCOUNTER — APPOINTMENT (OUTPATIENT)
Dept: INTERVENTIONAL RADIOLOGY/VASCULAR | Facility: HOSPITAL | Age: 89
End: 2024-04-30
Payer: MEDICARE

## 2024-04-30 ENCOUNTER — APPOINTMENT (OUTPATIENT)
Dept: ULTRASOUND IMAGING | Facility: HOSPITAL | Age: 89
End: 2024-04-30
Payer: MEDICARE

## 2024-04-30 LAB
BACTERIA SPEC AEROBE CULT: ABNORMAL
GRAM STN SPEC: ABNORMAL
GRAM STN SPEC: ABNORMAL
INR PPP: 1.63 (ref 0.89–1.12)
ISOLATED FROM: ABNORMAL
PROTHROMBIN TIME: 19.5 SECONDS (ref 12.2–14.5)
VANCOMYCIN SERPL-MCNC: 19.6 MCG/ML (ref 5–40)

## 2024-04-30 PROCEDURE — 25010000002 PIPERACILLIN SOD-TAZOBACTAM PER 1 G: Performed by: FAMILY MEDICINE

## 2024-04-30 PROCEDURE — 85610 PROTHROMBIN TIME: CPT | Performed by: RADIOLOGY

## 2024-04-30 PROCEDURE — 76705 ECHO EXAM OF ABDOMEN: CPT

## 2024-04-30 PROCEDURE — 80202 ASSAY OF VANCOMYCIN: CPT

## 2024-04-30 RX ORDER — FENTANYL CITRATE 50 UG/ML
INJECTION, SOLUTION INTRAMUSCULAR; INTRAVENOUS
Status: DISPENSED
Start: 2024-04-30 | End: 2024-04-30

## 2024-04-30 RX ORDER — LEVOFLOXACIN 750 MG/1
750 TABLET, FILM COATED ORAL EVERY OTHER DAY
Qty: 2 TABLET | Refills: 0 | Status: DISCONTINUED | OUTPATIENT
Start: 2024-04-30 | End: 2024-05-01 | Stop reason: HOSPADM

## 2024-04-30 RX ORDER — LEVOFLOXACIN 750 MG/1
750 TABLET, FILM COATED ORAL
Qty: 1 TABLET | Refills: 0 | Status: SHIPPED | OUTPATIENT
Start: 2024-05-02 | End: 2024-05-03

## 2024-04-30 RX ORDER — MIDODRINE HYDROCHLORIDE 5 MG/1
5 TABLET ORAL
Qty: 90 TABLET | Refills: 0 | Status: SHIPPED | OUTPATIENT
Start: 2024-04-30 | End: 2024-05-31

## 2024-04-30 RX ORDER — MIDAZOLAM HYDROCHLORIDE 1 MG/ML
INJECTION INTRAMUSCULAR; INTRAVENOUS
Status: DISPENSED
Start: 2024-04-30 | End: 2024-04-30

## 2024-04-30 RX ORDER — HEPARIN SODIUM 200 [USP'U]/100ML
INJECTION, SOLUTION INTRAVENOUS
Status: DISPENSED
Start: 2024-04-30 | End: 2024-04-30

## 2024-04-30 RX ORDER — LACTULOSE 10 G/15ML
10 SOLUTION ORAL DAILY
Qty: 473 ML | Refills: 0 | Status: SHIPPED | OUTPATIENT
Start: 2024-04-30

## 2024-04-30 RX ORDER — LIDOCAINE HYDROCHLORIDE AND EPINEPHRINE 10; 10 MG/ML; UG/ML
INJECTION, SOLUTION INFILTRATION; PERINEURAL
Status: COMPLETED
Start: 2024-04-30 | End: 2024-05-01

## 2024-04-30 RX ADMIN — ATORVASTATIN CALCIUM 80 MG: 40 TABLET, FILM COATED ORAL at 12:31

## 2024-04-30 RX ADMIN — ASPIRIN 81 MG CHEWABLE TABLET 81 MG: 81 TABLET CHEWABLE at 12:31

## 2024-04-30 RX ADMIN — Medication 10 ML: at 20:31

## 2024-04-30 RX ADMIN — MIDODRINE HYDROCHLORIDE 5 MG: 5 TABLET ORAL at 18:28

## 2024-04-30 RX ADMIN — MEMANTINE 5 MG: 10 TABLET ORAL at 12:34

## 2024-04-30 RX ADMIN — RIFAXIMIN 550 MG: 550 TABLET ORAL at 12:31

## 2024-04-30 RX ADMIN — MEMANTINE 5 MG: 10 TABLET ORAL at 20:30

## 2024-04-30 RX ADMIN — RIFAXIMIN 550 MG: 550 TABLET ORAL at 20:30

## 2024-04-30 RX ADMIN — PANTOPRAZOLE SODIUM 40 MG: 40 INJECTION, POWDER, FOR SOLUTION INTRAVENOUS at 05:27

## 2024-04-30 RX ADMIN — PIPERACILLIN AND TAZOBACTAM 3.38 G: 3; .375 INJECTION, POWDER, LYOPHILIZED, FOR SOLUTION INTRAVENOUS at 05:27

## 2024-04-30 RX ADMIN — MIDODRINE HYDROCHLORIDE 5 MG: 5 TABLET ORAL at 12:32

## 2024-04-30 RX ADMIN — LEVOFLOXACIN 750 MG: 750 TABLET, FILM COATED ORAL at 12:32

## 2024-04-30 RX ADMIN — Medication 1 TABLET: at 12:34

## 2024-04-30 RX ADMIN — AMLODIPINE BESYLATE 5 MG: 5 TABLET ORAL at 12:34

## 2024-04-30 NOTE — PLAN OF CARE
Goal Outcome Evaluation:  Plan of Care Reviewed With: patient        Progress: no change  Outcome Evaluation: Pt. was off the floor to IR.  Spoke with wife and son at BS.  Per hospice note, ambulance scheduled for tomorrow at 1630.       1300 Palliative IDT meeting: TIFFANI Webb RN, PN; AMARA Yi, APRN; BENNY Finley MDiv, [facility] ;VALENCIA Lozano MD.; BENNY Bahena RN, JES; VALENCIA Freeman LCSW, [facility]    After hours, weekends and holidays, contact Palliative Provider by calling [phone].

## 2024-04-30 NOTE — PROGRESS NOTES
Call from the pt.'s staff RN, Norman, stating that the pt.'s ambulance needs to be rescheduled for tomorrow afternoon. Pt.'s ambulance is scheduled for 1630 on 5/1/24. Message was sent to [facility] office BCN & pt.'s care team. Hospice will continue to follow. If further assistance is needed, please call [phone].

## 2024-04-30 NOTE — PROCEDURES
The following procedure was requested: Aspira placement.    The patient got his BID Eliquis at 2120 last night per MAR.    Need to hold it for 4 doses minimum (ideally 6 doses due to renal failure). Reschedule case.    Please see corresponding Radiology report for in detail procedural information. The Radiology report will be dictated shortly, if not done so already. Please see the IR RN note for the information regarding medicines administered if any, silvia-procedural vitals and I/O information.

## 2024-04-30 NOTE — PROGRESS NOTES
[facility] Medicine Services  PROGRESS NOTE    Patient Name: Onel Clark  : 3/25/1933  MRN: [id number]    Date of Admission: 2024  Primary Care Physician: Ilir Fair MD    Subjective   Subjective     CC:  Confusion    HPI:  Unable to perform palliative peritoneal drain as patient's Eliquis was not held last night.  Plan is for Eliquis to be held and procedure to be done in the morning.  Spoke with Kari Hsieh NP from wound care who stated that foot culture grew Pseudomonas which is sensitive to Cipro and Levaquin.  I updated patient's family with plan of care well as results.    Objective   Objective     Vital Signs:   Temp:  [98 °F (36.7 °C)-98.2 °F (36.8 °C)] 98 °F (36.7 °C)  Heart Rate:  [62-71] 68  Resp:  [16-20] 16  BP: (103-115)/(45-74) 115/61     Physical Exam:  Constitutional: No acute distress, awake, alert  HENT: NCAT, mucous membranes moist  Respiratory: Clear to auscultation bilaterally, respiratory effort normal   Cardiovascular: irregular rate, systolic murmur, rubs, or gallops  Gastrointestinal: Positive bowel sounds, soft, nontender, nondistended  Musculoskeletal: No bilateral ankle edema  Psychiatric: Appropriate affect, cooperative  Neurologic: Oriented x 2 self and time, does know city but not state ,speech clear, globally weak. Does have some confusion.   Skin: No rashes    Results Reviewed:  LAB RESULTS:      Lab 24  0821 24  0720 24  0906 24  1538 24  1257   WBC  --  13.80* 15.00*  --  15.76*   HEMOGLOBIN  --  9.1* 10.2*  --  10.5*   HEMATOCRIT  --  27.5* 30.9*  --  32.0*   PLATELETS  --  321 335  --  352   NEUTROS ABS  --   --  12.38*  --  12.54*   IMMATURE GRANS (ABS)  --   --  0.06*  --  0.05   LYMPHS ABS  --   --  1.10  --  1.88   MONOS ABS  --   --  1.41*  --  1.24*   EOS ABS  --   --  0.00  --  0.00   MCV  --  87.3 87.5  --  87.9   CRP  --   --   --  19.16*  --    PROCALCITONIN  --   --   --   --  1.52*  "  LACTATE  --   --   --   --  1.8   PROTIME 19.5*  --   --   --   --          Lab 04/29/24  0720 04/28/24  0440 04/27/24  1755 04/27/24  1538 04/27/24  1257   SODIUM 139 139 139  --  138   POTASSIUM 4.4 4.2 4.6  --  4.8   CHLORIDE 102 103 103  --  102   CO2 21.0* 24.0 23.0  --  24.0   ANION GAP 16.0* 12.0 13.0  --  12.0   BUN 51* 49* 51*  --  51*   CREATININE 2.39* 2.18* 2.09*  --  2.25*   EGFR 25.0* 27.9* 29.3*  --  26.9*   GLUCOSE 137* 165* 124*  --  134*   CALCIUM 8.6 8.6 8.6  --  8.8   MAGNESIUM 2.0 2.5* 1.5*  --  1.5*   TSH  --   --   --  2.610  --          Lab 04/27/24  1257   TOTAL PROTEIN 6.0   ALBUMIN 3.0*   GLOBULIN 3.0   ALT (SGPT) 9   AST (SGOT) 18   BILIRUBIN 1.0   ALK PHOS 222*         Lab 04/30/24  0821 04/27/24  1538 04/27/24  1257   HSTROP T  --  134* 134*   PROTIME 19.5*  --   --    INR 1.63*  --   --                  Brief Urine Lab Results  (Last result in the past 365 days)        Color   Clarity   Blood   Leuk Est   Nitrite   Protein   CREAT   Urine HCG        04/28/24 1814             124.4                 Cultures:  No results found for: \"BLOODCX\", \"URINECX\", \"WOUNDCX\", \"MRSACX\", \"RESPCX\", \"STOOLCX\"    Microbiology Results Abnormal       Procedure Component Value - Date/Time    Blood Culture - Blood, Arm, Right [[id number]]  (Normal) Collected: 04/27/24 1540    Lab Status: Preliminary result Specimen: Blood from Arm, Right Updated: 04/30/24 1700     Blood Culture No growth at 3 days    MRSA Screen, PCR (Inpatient) - Swab, Nares [[id number]]  (Normal) Collected: 04/27/24 1401    Lab Status: Final result Specimen: Swab from Nares Updated: 04/27/24 1522     MRSA PCR Negative    Narrative:      The negative predictive value of this diagnostic test is high and should only be used to consider de-escalating anti-MRSA therapy. A positive result may indicate colonization with MRSA and must be correlated clinically.  MRSA Negative            US Abdomen Limited    Result Date: 4/30/2024  US ABDOMEN " LIMITED Date of Exam: 4/30/2024 11:10 AM EDT Indication: ascites. Comparison: No comparisons available. Technique: Grayscale and color Doppler ultrasound evaluation of the right upper quadrant was performed. Findings: There is a large amount of ascites in all 4 quadrants.     Impression: Impression: Large amount of abdominal and pelvic ascites. Electronically Signed: Angeles Lawrence MD  4/30/2024 11:30 AM EDT  Workstation ID: [id number]         Current medications:  Scheduled Meds:amLODIPine, 5 mg, Oral, Daily  aspirin, 81 mg, Oral, Daily  atorvastatin, 80 mg, Oral, Daily  fentaNYL citrate (PF), , ,   heparin (porcine), , ,   iopamidol, , ,   lactulose, 10 g, Oral, Daily  levoFLOXacin, 750 mg, Oral, Every Other Day  levothyroxine, 137 mcg, Oral, QAM  lidocaine 1% - EPINEPHrine 1:[id number], , ,   memantine, 5 mg, Oral, BID  midazolam, , ,   midodrine, 5 mg, Oral, TID AC  multivitamin with minerals, 1 tablet, Oral, Daily  pantoprazole, 40 mg, Intravenous, Q AM  rifAXIMin, 550 mg, Oral, Q12H  senna-docusate sodium, 2 tablet, Oral, BID  sodium chloride, 10 mL, Intravenous, Q12H      Continuous Infusions:     PRN Meds:.  acetaminophen    senna-docusate sodium **AND** polyethylene glycol **AND** bisacodyl **AND** bisacodyl    Calcium Replacement - Follow Nurse / BPA Driven Protocol    dextrose    dextrose    fentaNYL citrate (PF)    glucagon (human recombinant)    haloperidol lactate    heparin (porcine)    iopamidol    lidocaine 1% - EPINEPHrine [id number]    Magnesium Standard Dose Replacement - Follow Nurse / BPA Driven Protocol    midazolam    Morphine **AND** naloxone    nitroglycerin    ondansetron    Phosphorus Replacement - Follow Nurse / BPA Driven Protocol    Potassium Replacement - Follow Nurse / BPA Driven Protocol    QUEtiapine    sodium chloride    sodium chloride    sodium chloride    Assessment & Plan   Assessment & Plan     Active Hospital Problems    Diagnosis  POA    **Metabolic encephalopathy [G93.41]  Yes     Chronic anticoagulation [Z79.01]  Not Applicable    PVD (peripheral vascular disease) [I73.9]  Yes    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        Brief Hospital Course to date:  Metabolic encephalopathy    Type 2 diabetes mellitus without complication, without long-term current use of insulin    Primary hypertension    Atrial flutter with controlled response    Chronic anticoagulation    PVD (peripheral vascular disease)    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 2 diabetes mellitus    Cirrhosis of liver    Hx-TIA (transient ischemic attack)    CHF (no ECHO on file currently)    Elevated serum creatinine    CKD (chronic kidney disease) stage 3, GFR 30-59 ml/min    Hypomagnesemia     Metabolic encephalopathy  - improved  - ammonia = normal   - no overt evidence of infectious source but has elevated CRP and WBC's  - was on outpt PO Doxy for left heel & toe diabetic ulcer but currently does not appear infected  - continue empiric Zosyn and Vanc started in ED however low threshold to convert back to his PO Doxy after WOC evals wounds and documents  - hx of Aflutter, MRI brain ordered to rule out embolic dz given vague confusion presentation to rule out embolic phenomena however family declined MRI as they are contemplating on hospice care 04/29  -blood cx pending however NGTD. MRSA screen negative   -Addendum:  1/2 blood cx positive .Likely contaminant     CHICO on CKD Stage III, concern for hepatorenal syndrome   - moderate ascites  - s/p 25g albumin IV with 1L LR over 10 hours on admission   - appreciated nephrology recommendations     Aortic Stenosis  -declined for TAVR by Fish PARKER     Cirrhosis  - has needed outpt paracentesis more often  - currently abd soft without dyspnea, moderate ascites noted on imaging  - [place] [facility]  - family interested in standing outpt paracentesis orders to decrease difficulty keeping patient hepatically homeostatic    -:plan for palliative drain 05/01    Diabetic left foot wound 1st toe and heel   - continue dry gauze wraps  - WOC  -spoke with Wound Care on 04/29 (RN) and 04/30 (Annalisa Hsieh, ALFONSO) Cx is growing Pseudomonas which is sensitive to Cipro and Levaquin       NIDDM2  - LDSSI     PVD  Hx of TIAs  HTN  HL  Aflutter on chronic anticoagulation  -continue Eliquis  - not on rate medications per home meds  - continue ASA 81mg and Lipitor 80mg      Senile cognitive impairment  At risk for hospital delirium  - takes Namenda at home   - delirium mitigation pathway enacted      GOC: Please see ACP note. Plan for palliative peritoneal drain tomorrow morning then home with hospice( Eliquis has been held) Patient is on comfort care with exception of antibiotics.     Total time spent: 40 minutes with greater than 50% of the time in counseling with family and coordination of care with sub specialists and nursing staff.     Expected Discharge Location and Transportation: TBD  Expected Discharge TBD  Expected Discharge Date: 4/30/2024; Expected Discharge Time:      DVT prophylaxis:  Medical DVT prophylaxis orders are present.         AM-PAC 6 Clicks Score (PT): 12 (04/28/24 2100)    CODE STATUS:   Code Status and Medical Interventions:   Ordered at: 04/29/24 1149     Level Of Support Discussed With:    Next of Kin (If No Surrogate)     Code Status (Patient has no pulse and is not  breathing):    No CPR (Do Not Attempt to Resuscitate)     Medical Interventions (Patient has pulse or is breathing):    Comfort Measures     Comments:    exception for antibiotics       Lola Rodrigues MD  04/30/24

## 2024-04-30 NOTE — THERAPY DISCHARGE NOTE
Patient Name: Onel Clark  : 3/25/1933    MRN: [id number]                              Today's Date: 2024       Admit Date: 2024    Visit Dx:     ICD-10-CM ICD-9-CM   1. Confusion  R41.0 298.9   2. Wound of left lower extremity, initial encounter  S81.802A 894.0   3. Wound of right lower extremity, initial encounter  S81.801A 894.0   4. History of cirrhosis  Z87.19 V12.79   5. Elevated troponin  R79.89 790.6   6. SIRS (systemic inflammatory response syndrome)  R65.10 995.90   7. CHICO (acute kidney injury)  N17.9 584.9   8. Anemia, unspecified type  D64.9 285.9   9. Atrial flutter with controlled response  I48.92 427.32     Patient Active Problem List   Diagnosis    Right leg pain    Degenerative lumbar spinal stenosis    Hereditary and idiopathic peripheral neuropathy    History of lumbar laminectomy    Type 2 diabetes mellitus without complication, without long-term current use of insulin    Primary hypertension    Aortic stenosis, severe    Atrial flutter with controlled response    SOB (shortness of breath)    Metabolic encephalopathy    Chronic anticoagulation    PVD (peripheral vascular disease)    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 2 diabetes mellitus    Cirrhosis of liver    Hx-TIA (transient ischemic attack)    CHF (no ECHO on file currently)    Elevated serum creatinine    CKD (chronic kidney disease) stage 3, GFR 30-59 ml/min    Hypomagnesemia     Past Medical History:   Diagnosis Date    Acquired hypothyroidism     Allergic     Allergies     Arthritis     Benign prostatic hyperplasia with nocturia     Bilateral carotid artery disease     Cancer of the skin, basal cell     Cataract     CHF (congestive heart failure)     Chronic non-seasonal allergic rhinitis     DUE TO POLLEN    Chronic renal impairment     Cirrhosis of liver 2024    CKD (chronic kidney disease) stage 3, GFR 30-59 ml/min 2024    COPD (chronic obstructive pulmonary disease)     Coronary artery  disease     Decreased hearing of both ears     Degenerative lumbar spinal stenosis     Diabetes mellitus     Elevated PSA     Frequent falls     GERD without esophagitis     Heart murmur     High risk medication use     HL (hearing loss)     Hx of bilateral hip replacements     Hx of decompressive lumbar laminectomy     Hx of total knee replacement, bilateral     Hx of transient ischemic attack (TIA)     Hypertension     Mixed hyperlipidemia due to type 2 diabetes mellitus     Nicotine dependence     No natural teeth     FALSE TEETH    Osteoarthritis     Primary hypertension     Primary osteoarthritis involving multiple joints     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 2 diabetes mellitus     PVD (peripheral vascular disease) 04/27/2024    Stroke TIA  2 years ago    Thyroid disease     TIA (transient ischemic attack)     Type 2 diabetes mellitus with diabetic polyneuropathy     Type 2 diabetes mellitus with stage 2 chronic kidney disease, without long-term current use of insulin      Past Surgical History:   Procedure Laterality Date    BACK SURGERY  2007    Lumbar Laminectomy    CARPAL TUNNEL RELEASE  2012    COLONOSCOPY      EYE SURGERY  cataracts    JOINT REPLACEMENT  both hips & knee & back    REPLACEMENT TOTAL KNEE Left 2013    SPINE SURGERY  about 15 yrs ago    TOTAL HIP ARTHROPLASTY Right 2016      General Information       Row Name 04/30/24 1526          Physical Therapy Time and Intention    Document Type discharge evaluation/summary  -     Mode of Treatment physical therapy  -       Row Name 04/30/24 1526          General Information    Patient Profile Reviewed yes  -ML               User Key  (r) = Recorded By, (t) = Taken By, (c) = Cosigned By      Initials Name Provider Type    ML Bisi Cee Physical Therapist                   Mobility    No documentation.                  Obj/Interventions    No documentation.                  Goals/Plan       Row Name 04/30/24 1528          Bed Mobility Goal 1 (PT)     Activity/Assistive Device (Bed Mobility Goal 1, PT) sit to supine/supine to sit  -ML     [place] Level/Cues Needed (Bed Mobility Goal 1, PT) minimum assist (75% or more patient effort)  -ML     Progress/Outcomes (Bed Mobility Goal 1, PT) goal no longer appropriate  -ML       Row Name 04/30/24 1528          Transfer Goal 1 (PT)    Activity/Assistive Device (Transfer Goal 1, PT) sit-to-stand/stand-to-sit;bed-to-chair/chair-to-bed;walker, rolling  -ML     [place] Level/Cues Needed (Transfer Goal 1, PT) minimum assist (75% or more patient effort)  -ML     Progress/Outcome (Transfer Goal 1, PT) goal no longer appropriate  -ML       Row Name 04/30/24 1528          Gait Training Goal 1 (PT)    Activity/Assistive Device (Gait Training Goal 1, PT) gait (walking locomotion);increase endurance/gait distance;decrease fall risk;assistive device use;walker, rolling  -ML     [place] Level (Gait Training Goal 1, PT) minimum assist (75% or more patient effort)  -ML     Progress/Outcome (Gait Training Goal 1, PT) goal no longer appropriate  -ML               User Key  (r) = Recorded By, (t) = Taken By, (c) = Cosigned By      Initials Name Provider Type    Bisi Price Physical Therapist                   Clinical Impression       Row Name 04/30/24 1526          Plan of Care Review    Outcome Evaluation Patient transitioned to comfort measures, plans for home with hospice per notes 5/1/24. Will complete PT consult at this time.  -ML               User Key  (r) = Recorded By, (t) = Taken By, (c) = Cosigned By      Initials Name Provider Type    Bisi Price Physical Therapist                   Outcome Measures    No documentation.                 Physical Therapy Education       Title: PT OT SLP Therapies (In Progress)       Topic: Physical Therapy (In Progress)       Point: Mobility training (In Progress)       Learning Progress Summary             Patient Acceptance, E, NR by ND at 4/28/2024 1150   Family  Acceptance, E, NR by ND at 4/28/2024 1150                         Point: Home exercise program (Not Started)       Learner Progress:  Not documented in this visit.              Point: Body mechanics (In Progress)       Learning Progress Summary             Patient Acceptance, E, NR by ND at 4/28/2024 1150   Family Acceptance, E, NR by ND at 4/28/2024 1150                         Point: Precautions (In Progress)       Learning Progress Summary             Patient Acceptance, E, NR by ND at 4/28/2024 1150   Family Acceptance, E, NR by ND at 4/28/2024 1150                                         User Key       Initials Effective Dates Name Provider Type Discipline    ND 11/16/23 -  Tatum Martin, PT Physical Therapist PT                  PT Recommendation and Plan     Outcome Evaluation: Patient transitioned to comfort measures, plans for home with hospice per notes 5/1/24. Will complete PT consult at this time.     Time Calculation:         PT Charges       Row Name 04/30/24 1528             Time Calculation    Start Time 1528  -ML      PT Received On 04/30/24  -ML                User Key  (r) = Recorded By, (t) = Taken By, (c) = Cosigned By      Initials Name Provider Type    Bisi Price Physical Therapist                      PT G-Codes  Outcome Measure Options: AM-PAC 6 Clicks Basic Mobility (PT)  AM-PAC 6 Clicks Score (PT): 12    PT Discharge Summary  Reason for Discharge: other (comment) (comfort measures/home with hospice)    Bisi Cee  4/30/2024

## 2024-04-30 NOTE — CASE MANAGEMENT/SOCIAL WORK
Case Management Discharge Note      Final Note: Pt discharging home with hospice. Equipment arranged (see 4/29 hospice note). [facility] to transport @ 1615; will  Pt in room. Pt/family aware and agreeable to plan. No other discharge needs identified.    *Addendum: Per hospice note, ambulance rescheduled to 5/1 @ 1630.         Selected Continued Care - Admitted Since 4/27/2024       Destination    No services have been selected for the patient.                Durable Medical Equipment    No services have been selected for the patient.                Dialysis/Infusion    No services have been selected for the patient.                Home Medical Care Coordination complete.      Service Provider Selected Services Address Phone Fax Patient Preferred    [facility] [address] [place], [place] [id number] [phone] --              Therapy    No services have been selected for the patient.                Community Resources    No services have been selected for the patient.                Community & DME    No services have been selected for the patient.                    Transportation Services  Ambulance: [facility]    Final Discharge Disposition Code: 50 - home with hospice

## 2024-05-01 ENCOUNTER — APPOINTMENT (OUTPATIENT)
Dept: INTERVENTIONAL RADIOLOGY/VASCULAR | Facility: HOSPITAL | Age: 89
End: 2024-05-01
Payer: MEDICARE

## 2024-05-01 VITALS
TEMPERATURE: 97.9 F | BODY MASS INDEX: 26.95 KG/M2 | WEIGHT: 199 LBS | HEART RATE: 66 BPM | OXYGEN SATURATION: 96 % | RESPIRATION RATE: 16 BRPM | DIASTOLIC BLOOD PRESSURE: 61 MMHG | HEIGHT: 72 IN | SYSTOLIC BLOOD PRESSURE: 119 MMHG

## 2024-05-01 DIAGNOSIS — K21.9 GASTROESOPHAGEAL REFLUX DISEASE WITHOUT ESOPHAGITIS: ICD-10-CM

## 2024-05-01 DIAGNOSIS — R41.3 MEMORY LOSS: ICD-10-CM

## 2024-05-01 PROBLEM — E83.42 HYPOMAGNESEMIA: Status: RESOLVED | Noted: 2024-04-27 | Resolved: 2024-05-01

## 2024-05-01 PROCEDURE — 49418 INSERT TUN IP CATH PERC: CPT

## 2024-05-01 PROCEDURE — 25010000002 FENTANYL CITRATE (PF) 50 MCG/ML SOLUTION: Performed by: RADIOLOGY

## 2024-05-01 PROCEDURE — C1729 CATH, DRAINAGE: HCPCS

## 2024-05-01 PROCEDURE — 0WHG33Z INSERTION OF INFUSION DEVICE INTO PERITONEAL CAVITY, PERCUTANEOUS APPROACH: ICD-10-PCS | Performed by: RADIOLOGY

## 2024-05-01 PROCEDURE — 99152 MOD SED SAME PHYS/QHP 5/>YRS: CPT

## 2024-05-01 PROCEDURE — C1894 INTRO/SHEATH, NON-LASER: HCPCS

## 2024-05-01 PROCEDURE — 76942 ECHO GUIDE FOR BIOPSY: CPT

## 2024-05-01 PROCEDURE — 25010000002 MIDAZOLAM PER 1 MG: Performed by: RADIOLOGY

## 2024-05-01 PROCEDURE — 25510000001 IOPAMIDOL 61 % SOLUTION

## 2024-05-01 RX ORDER — FENTANYL CITRATE 50 UG/ML
INJECTION, SOLUTION INTRAMUSCULAR; INTRAVENOUS AS NEEDED
Status: COMPLETED | OUTPATIENT
Start: 2024-05-01 | End: 2024-05-01

## 2024-05-01 RX ORDER — MIDAZOLAM HYDROCHLORIDE 1 MG/ML
INJECTION INTRAMUSCULAR; INTRAVENOUS
Status: DISCONTINUED
Start: 2024-05-01 | End: 2024-05-01 | Stop reason: HOSPADM

## 2024-05-01 RX ORDER — FENTANYL CITRATE 50 UG/ML
INJECTION, SOLUTION INTRAMUSCULAR; INTRAVENOUS
Status: DISCONTINUED
Start: 2024-05-01 | End: 2024-05-01 | Stop reason: HOSPADM

## 2024-05-01 RX ORDER — MIDAZOLAM HYDROCHLORIDE 1 MG/ML
INJECTION INTRAMUSCULAR; INTRAVENOUS AS NEEDED
Status: COMPLETED | OUTPATIENT
Start: 2024-05-01 | End: 2024-05-01

## 2024-05-01 RX ADMIN — MEMANTINE 5 MG: 10 TABLET ORAL at 10:32

## 2024-05-01 RX ADMIN — IOPAMIDOL 5 ML: 612 INJECTION, SOLUTION INTRAVENOUS at 10:07

## 2024-05-01 RX ADMIN — LIDOCAINE HYDROCHLORIDE AND EPINEPHRINE 10 ML: 10; 10 INJECTION, SOLUTION INFILTRATION; PERINEURAL at 10:07

## 2024-05-01 RX ADMIN — MIDAZOLAM HYDROCHLORIDE 0.5 MG: 1 INJECTION, SOLUTION INTRAMUSCULAR; INTRAVENOUS at 09:36

## 2024-05-01 RX ADMIN — Medication 10 ML: at 10:36

## 2024-05-01 RX ADMIN — RIFAXIMIN 550 MG: 550 TABLET ORAL at 10:30

## 2024-05-01 RX ADMIN — SENNOSIDES AND DOCUSATE SODIUM 2 TABLET: 8.6; 5 TABLET ORAL at 10:31

## 2024-05-01 RX ADMIN — ATORVASTATIN CALCIUM 80 MG: 40 TABLET, FILM COATED ORAL at 10:30

## 2024-05-01 RX ADMIN — Medication 1 TABLET: at 10:32

## 2024-05-01 RX ADMIN — FENTANYL CITRATE 25 MCG: 50 INJECTION, SOLUTION INTRAMUSCULAR; INTRAVENOUS at 09:46

## 2024-05-01 RX ADMIN — PANTOPRAZOLE SODIUM 40 MG: 40 INJECTION, POWDER, FOR SOLUTION INTRAVENOUS at 05:00

## 2024-05-01 RX ADMIN — LEVOTHYROXINE SODIUM 137 MCG: 137 TABLET ORAL at 10:30

## 2024-05-01 RX ADMIN — MIDAZOLAM HYDROCHLORIDE 0.5 MG: 1 INJECTION, SOLUTION INTRAMUSCULAR; INTRAVENOUS at 09:46

## 2024-05-01 RX ADMIN — MIDODRINE HYDROCHLORIDE 5 MG: 5 TABLET ORAL at 10:31

## 2024-05-01 RX ADMIN — FENTANYL CITRATE 25 MCG: 50 INJECTION, SOLUTION INTRAMUSCULAR; INTRAVENOUS at 09:36

## 2024-05-01 RX ADMIN — ASPIRIN 81 MG CHEWABLE TABLET 81 MG: 81 TABLET CHEWABLE at 10:30

## 2024-05-01 NOTE — DISCHARGE PLACEMENT REQUEST
"Onel Domingo (89 y.o. Male)    Discharge Summary   Date of Birth   03/25/1933    Social Security Number       Address   [address] [facility] [address]    Home Phone   [phone]    MRN   [id number]       [facility]   [facility]    Marital Status                               Admission Date   4/27/24    Admission Type   Emergency    Admitting Provider   Costa Vale MD    Attending Provider       Department, Room/Bed   [address], N619/1       Discharge Date   5/1/2024    Discharge Disposition   Hospice/Medical Facility (DC - External)    Discharge Destination                                 Attending Provider: (none)   Allergies: No Known Allergies    Isolation: None   Infection: None   Code Status: No CPR    Ht: 182.9 cm (72\")   Wt: 90.3 kg (199 lb)    Admission Cmt: None   Principal Problem: Metabolic encephalopathy [G93.41]                   Active Insurance as of 4/27/2024       Primary Coverage       Payor Plan Insurance Group Employer/Plan Group    MEDICARE MEDICARE A & B        Payor Plan Address Payor Plan Phone Number Payor Plan Fax Number Effective Dates    [address] [phone]  3/1/1998 - None Entered    [facility] [address]         Subscriber Name Subscriber Birth Date Member ID       ONEL DOMINGO 3/25/1933 [id number]               Secondary Coverage       Payor Plan Insurance Group Employer/Plan Group    [employer] - ASA [id number]       Payor Plan Address Payor Plan Phone Number Payor Plan Fax Number Effective Dates    [address]   5/20/2022 - None Entered    [place] [address]         Subscriber Name Subscriber Birth Date Member ID       ONEL DOMINGO 3/25/1933 [id number]                     Emergency Contacts        (Rel.) Home Phone Work Phone Mobile Phone    NGUYEN DOMINGO (POA) (Son) [phone] -- [phone]    J LUIS DOMINGO (Spouse) [phone] -- [phone]               "   Discharge Summary        Shayy Hope APRN at 24 0838       Attestation signed by Costa Vale MD at 24 2431    I have reviewed this documentation and agree.                      [facility] Medicine Services  DISCHARGE SUMMARY    Patient Name: Onel Clark  : 3/25/1933  MRN: [id number]    Date of Admission: 2024 12:44 PM  Date of Discharge:  2024  Primary Care Physician: Ilir Fair MD    Consults       Date and Time Order Name Status Description    2024 10:12 AM Inpatient Urology Consult Completed     2024  8:38 AM Inpatient Nephrology Consult Completed     2024  9:46 PM Inpatient Gastroenterology Consult Completed     2024  5:56 PM Inpatient Palliative Care MD Consult Completed             Hospital Course     Presenting Problem: Confusion    Active Hospital Problems    Diagnosis  POA    **Metabolic encephalopathy [G93.41]  Yes    Chronic anticoagulation [Z79.01]  Not Applicable    PVD (peripheral vascular disease) [I73.9]  Yes    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 2 diabetes mellitus [E11.621, L97.429]  Yes    Cirrhosis of liver [K74.60]  Yes    Hx-TIA (transient ischemic attack) [Z86.73]  Not Applicable    CHF (no ECHO on file currently) [I50.9]  Yes    Elevated serum creatinine [R79.89]  Yes    CKD (chronic kidney disease) stage 3, GFR 30-59 ml/min [N18.30]  Yes    Atrial flutter with controlled response [I48.92]  Yes    Type 2 diabetes mellitus without complication, without long-term current use of insulin [E11.9]  Yes    Primary hypertension [I10]  Yes      Resolved Hospital Problems    Diagnosis Date Resolved POA    Hypomagnesemia [E83.42] 2024 Yes          Hospital Course:  Onel Clark is a 89 y.o. male with past medical history of cirrhosis getting periodic outpatient paracentesis, diabetes mellitus type 2, history of TIA, atrial flutter on chronic anticoagulation, PVD, CKD 3,  and baseline chronic debility related to his advanced age. Patient presented to [facility] ED with acute onset of confusion and restlessness. Workup in ED was mostly unremarkable except for WBC 15 and elevated CRP without obvious source of infection, as well as hypomagnesemia. Patient had previously been on outpatient PO Doxy for left heel & toe diabetic ulcer. He was started on empiric Zosyn and vanc.     Palliative care and hospice were consulted for goals of care given patient's declining renal function, ascites, and poor p.o. intake.  Partner discussed with family and they have opted to take patient home with hospice care. Patient will continue Cipro for Pseudomonas coverage in the foot wound per family's wishes.  Hospice will continue wound care. Home medications have been adjusted with several being discontinued. Urology has placed a Varner catheter for urinary retention.  IR has placed a palliative peritoneal drain to manage ascites. Patient will discharge home today with hospice.     Metabolic encephalopathy  - hx of Aflutter, MRI brain ordered to rule out embolic dz given vague confusion presentation to rule out embolic phenomena however family declined MRI as they are contemplating on hospice care 04/29  -1/2 blood cx positive, likely contaminant  -blood cx NGTD. MRSA screen negative     CHICO on CKD Stage III, concern for hepatorenal syndrome   - urinary retention noted, Varner catheter placed by urology for comfort care     Aortic Stenosis  -declined for TAVR by Fish PARKER     Cirrhosis  - palliative drain placed 05/01/2024, drained 5.5L     Diabetic left foot wound 1st toe and heel   - Cx growing Pseudomonas, sensitive to Cipro and Levaquin    -Continue wound care     NIDDM2  - Home metformin dc'd     PVD  Hx of TIAs  HTN  HL  Aflutter on chronic anticoagulation  - continue Eliquis  - not on rate medications per home meds  - continue ASA 81mg and Lipitor 80mg      Senile cognitive impairment  At risk for hospital  delirium  - takes Namenda at home       Discharge Follow Up Recommendations for outpatient labs/diagnostics:  --Follow up with PCP as needed    Day of Discharge     HPI:   Resting in bed.  Family is at the bedside.  Patient has had peritoneal drain placed this morning and had 5.5 L removed.  He notes some pain in his feet still.  He otherwise denies concerns.  He says he is looking forward to going home.    Review of Systems  Gen- No fevers, chills  CV- No chest pain, palpitations  Resp- No cough, dyspnea  GI- No N/V/D, abd pain      Vital Signs:   Temp:  [97.9 °F (36.6 °C)-98.2 °F (36.8 °C)] 97.9 °F (36.6 °C)  Heart Rate:  [64-75] 66  Resp:  [12-18] 16  BP: (111-129)/(52-76) 117/61  Flow (L/min):  [0-2] 0      Physical Exam:  Constitutional: No acute distress, awake, alert, frail-appearing  HENT: NCAT, mucous membranes moist  Respiratory: Clear to auscultation bilaterally, respiratory effort normal, room air  Cardiovascular: Regular rhythm, + murmur, no rubs or gallops   Gastrointestinal: Positive bowel sounds, soft, nontender, nondistended, peritoneal during RUQ  Musculoskeletal: No bilateral ankle edema  Psychiatric: Appropriate affect, cooperative  Neurologic: Oriented x 3, moves all extremities, speech clear  Skin: No rashes      Pertinent  and/or Most Recent Results     LAB RESULTS:      Lab 04/30/24  0821 04/29/24  0720 04/28/24  0906 04/27/24  1538 04/27/24  1257   WBC  --  13.80* 15.00*  --  15.76*   HEMOGLOBIN  --  9.1* 10.2*  --  10.5*   HEMATOCRIT  --  27.5* 30.9*  --  32.0*   PLATELETS  --  321 335  --  352   NEUTROS ABS  --   --  12.38*  --  12.54*   IMMATURE GRANS (ABS)  --   --  0.06*  --  0.05   LYMPHS ABS  --   --  1.10  --  1.88   MONOS ABS  --   --  1.41*  --  1.24*   EOS ABS  --   --  0.00  --  0.00   MCV  --  87.3 87.5  --  87.9   CRP  --   --   --  19.16*  --    PROCALCITONIN  --   --   --   --  1.52*   LACTATE  --   --   --   --  1.8   PROTIME 19.5*  --   --   --   --          Lab  04/29/24  0720 04/28/24  0440 04/27/24  1755 04/27/24  1538 04/27/24  1257   SODIUM 139 139 139  --  138   POTASSIUM 4.4 4.2 4.6  --  4.8   CHLORIDE 102 103 103  --  102   CO2 21.0* 24.0 23.0  --  24.0   ANION GAP 16.0* 12.0 13.0  --  12.0   BUN 51* 49* 51*  --  51*   CREATININE 2.39* 2.18* 2.09*  --  2.25*   EGFR 25.0* 27.9* 29.3*  --  26.9*   GLUCOSE 137* 165* 124*  --  134*   CALCIUM 8.6 8.6 8.6  --  8.8   MAGNESIUM 2.0 2.5* 1.5*  --  1.5*   TSH  --   --   --  2.610  --          Lab 04/27/24  1257   TOTAL PROTEIN 6.0   ALBUMIN 3.0*   GLOBULIN 3.0   ALT (SGPT) 9   AST (SGOT) 18   BILIRUBIN 1.0   ALK PHOS 222*         Lab 04/30/24  0821 04/27/24  1538 04/27/24  1257   HSTROP T  --  134* 134*   PROTIME 19.5*  --   --    INR 1.63*  --   --                  Brief Urine Lab Results  (Last result in the past 365 days)        Color   Clarity   Blood   Leuk Est   Nitrite   Protein   CREAT   Urine HCG        04/28/24 1814             124.4               Microbiology Results (last 10 days)       Procedure Component Value - Date/Time    Blood Culture - Blood, Arm, Right [[id number]]  (Normal) Collected: 04/27/24 1540    Lab Status: Preliminary result Specimen: Blood from Arm, Right Updated: 04/30/24 1700     Blood Culture No growth at 3 days    MRSA Screen, PCR (Inpatient) - Swab, Nares [[id number]]  (Normal) Collected: 04/27/24 1401    Lab Status: Final result Specimen: Swab from Nares Updated: 04/27/24 1522     MRSA PCR Negative    Narrative:      The negative predictive value of this diagnostic test is high and should only be used to consider de-escalating anti-MRSA therapy. A positive result may indicate colonization with MRSA and must be correlated clinically.  MRSA Negative    Blood Culture - Blood, Arm, Left [[id number]]  (Abnormal) Collected: 04/27/24 9930    Lab Status: Final result Specimen: Blood from Arm, Left Updated: 04/30/24 0640     Blood Culture Staphylococcus, coagulase negative     Isolated from Aerobic  Bottle     Gram Stain Aerobic Bottle Gram positive cocci in pairs and clusters      Anaerobic Bottle Gram positive cocci in pairs and clusters    Narrative:      Probable contaminant requires clinical correlation, susceptibility not performed unless requested by physician.    Less than seven (7) mL's of blood was collected.  Insufficient quantity may yield false negative results.    Blood Culture ID, PCR - Blood, Arm, Left [[id number]]  (Abnormal) Collected: 04/27/24 1359    Lab Status: Final result Specimen: Blood from Arm, Left Updated: 04/28/24 1831     BCID, PCR Staph spp, not aureus or lugdunensis. Identification by BCID2 PCR.     BOTTLE TYPE Aerobic Bottle            US Abdomen Limited    Result Date: 4/30/2024  US ABDOMEN LIMITED Date of Exam: 4/30/2024 11:10 AM EDT Indication: ascites. Comparison: No comparisons available. Technique: Grayscale and color Doppler ultrasound evaluation of the right upper quadrant was performed. Findings: There is a large amount of ascites in all 4 quadrants.     Impression: Large amount of abdominal and pelvic ascites. Electronically Signed: Angeles Lawrence MD  4/30/2024 11:30 AM EDT  Workstation ID: [id number]    CT Chest Without Contrast Diagnostic    Result Date: 4/27/2024  CT HEAD WO CONTRAST, CT CHEST WO CONTRAST DIAGNOSTIC, CT ABDOMEN PELVIS WO CONTRAST Date of Exam: 4/27/2024 2:51 PM EDT Indication: AMS, confusion. Comparison: 12/4/2023 chest CT and 1/3/2024 PET/CT Technique: Axial CT images were obtained of the head, chest, abdomen, and pelvis without contrast administration.  Automated exposure control and iterative construction methods were used. Findings: CT HEAD: There is no evidence of acute intracranial hemorrhage, mass, midline shift, loss of gray-white matter differentiation, or extra-axial fluid collection. Subcortical periventricular white matter hypodensities are present consistent with small vessel ischemic disease. There is extensive global parenchymal volume  loss with ex vacuo dilation of the ventricular system. The basal cisterns are patent. There is no fracture or suspicious osseous lesion. Mild mucosal thickening of the bilateral maxillary sinuses. Partial mastoid air cell effusions. Bilateral ocular surgery. Soft tissues are unremarkable. CT CHEST: The central airways are patent. There are hypoventilatory changes of the lung bases, left greater than right, with associated left lower lobe rounded atelectasis. This is unchanged from recent PET/CT. There is no new focal airspace consolidation. No new suspicious pulmonary nodule or mass. Unchanged small left pleural effusion, likely chronic given associated pleural wall thickening. Unchanged cardiomegaly. Dense atherosclerotic calcifications of the coronary arteries and aortic valve. There are also dense thoracic aortic calcifications. Trace pericardial effusion. No mediastinal mass or threshold lymphadenopathy. Chest wall soft tissues show no acute abnormality. Gynecomastia. No evidence of chest wall fracture or suspicious osseous lesion. CT ABDOMEN/PELVIS: Cirrhotic morphology of the liver. No suspicious hepatic lesion. The gallbladder is unremarkable. No significant biliary ductal dilation. The spleen, pancreas, and bilateral adrenal glands show no acute abnormality. There is a small right superior pole renal cyst, unchanged. Bilateral renal parenchymal scarring/thinning. No hydronephrosis or nephrolithiasis. No suspicious renal lesion. Small hiatal hernia. Thickening of the gastric wall which may be secondary to under distention. There is no evidence of bowel obstruction. Colonic diverticulosis without definite evidence of diverticulitis. Appendix is not well visualized and may be surgically absent or decompressed. There is no evidence of suspicious lymphadenopathy. There are shotty retroperitoneal and mesenteric lymph nodes which are unchanged from January 2024 PET/CT. There is moderate abdominopelvic ascites with  autumn mesentery. No evidence of abdominal aortic aneurysm. Dense atherosclerotic calcifications of the aorta and branch vessels. The urinary bladder and prostate is difficult to evaluate given adjacent metallic artifact, however is grossly unremarkable. The abdominal and pelvic wall soft tissues show no acute abnormality. There is mild diffuse fatty muscle atrophy without discrete fluid collection. Bilateral hip arthroplasties. Cerclage fixation of the right intertrochanteric region. Chronic compression deformity of L1 vertebral body. No evidence of acute fracture or suspicious bony lesion.     Impression: CT HEAD: No acute intracranial abnormality. Chronic sequela of probable small vessel ischemic disease and severe global parenchymal volume loss. CT CHEST: No acute abnormality in the chest. Bilateral hypoventilatory changes with round atelectasis in the left lung base. Chronic adjacent small left pleural effusion with pleural wall thickening, not significantly changed from December 2023 chest CT.  Chronic ancillary findings as above. CT ABDOMEN/PELVIS: No acute abnormality in the abdomen or pelvis. Cirrhosis with moderate abdominopelvic ascites. Chronic ancillary findings as above. Electronically Signed: Kenroy Mcneil MD  4/27/2024 3:30 PM EDT  Workstation ID: [id number]    CT Abdomen Pelvis Without Contrast    Result Date: 4/27/2024  CT HEAD WO CONTRAST, CT CHEST WO CONTRAST DIAGNOSTIC, CT ABDOMEN PELVIS WO CONTRAST Date of Exam: 4/27/2024 2:51 PM EDT Indication: AMS, confusion. Comparison: 12/4/2023 chest CT and 1/3/2024 PET/CT Technique: Axial CT images were obtained of the head, chest, abdomen, and pelvis without contrast administration.  Automated exposure control and iterative construction methods were used. Findings: CT HEAD: There is no evidence of acute intracranial hemorrhage, mass, midline shift, loss of gray-white matter differentiation, or extra-axial fluid collection. Subcortical periventricular white  matter hypodensities are present consistent with small vessel ischemic disease. There is extensive global parenchymal volume loss with ex vacuo dilation of the ventricular system. The basal cisterns are patent. There is no fracture or suspicious osseous lesion. Mild mucosal thickening of the bilateral maxillary sinuses. Partial mastoid air cell effusions. Bilateral ocular surgery. Soft tissues are unremarkable. CT CHEST: The central airways are patent. There are hypoventilatory changes of the lung bases, left greater than right, with associated left lower lobe rounded atelectasis. This is unchanged from recent PET/CT. There is no new focal airspace consolidation. No new suspicious pulmonary nodule or mass. Unchanged small left pleural effusion, likely chronic given associated pleural wall thickening. Unchanged cardiomegaly. Dense atherosclerotic calcifications of the coronary arteries and aortic valve. There are also dense thoracic aortic calcifications. Trace pericardial effusion. No mediastinal mass or threshold lymphadenopathy. Chest wall soft tissues show no acute abnormality. Gynecomastia. No evidence of chest wall fracture or suspicious osseous lesion. CT ABDOMEN/PELVIS: Cirrhotic morphology of the liver. No suspicious hepatic lesion. The gallbladder is unremarkable. No significant biliary ductal dilation. The spleen, pancreas, and bilateral adrenal glands show no acute abnormality. There is a small right superior pole renal cyst, unchanged. Bilateral renal parenchymal scarring/thinning. No hydronephrosis or nephrolithiasis. No suspicious renal lesion. Small hiatal hernia. Thickening of the gastric wall which may be secondary to under distention. There is no evidence of bowel obstruction. Colonic diverticulosis without definite evidence of diverticulitis. Appendix is not well visualized and may be surgically absent or decompressed. There is no evidence of suspicious lymphadenopathy. There are shotty  retroperitoneal and mesenteric lymph nodes which are unchanged from January 2024 PET/CT. There is moderate abdominopelvic ascites with autumn mesentery. No evidence of abdominal aortic aneurysm. Dense atherosclerotic calcifications of the aorta and branch vessels. The urinary bladder and prostate is difficult to evaluate given adjacent metallic artifact, however is grossly unremarkable. The abdominal and pelvic wall soft tissues show no acute abnormality. There is mild diffuse fatty muscle atrophy without discrete fluid collection. Bilateral hip arthroplasties. Cerclage fixation of the right intertrochanteric region. Chronic compression deformity of L1 vertebral body. No evidence of acute fracture or suspicious bony lesion.     Impression: CT HEAD: No acute intracranial abnormality. Chronic sequela of probable small vessel ischemic disease and severe global parenchymal volume loss. CT CHEST: No acute abnormality in the chest. Bilateral hypoventilatory changes with round atelectasis in the left lung base. Chronic adjacent small left pleural effusion with pleural wall thickening, not significantly changed from December 2023 chest CT.  Chronic ancillary findings as above. CT ABDOMEN/PELVIS: No acute abnormality in the abdomen or pelvis. Cirrhosis with moderate abdominopelvic ascites. Chronic ancillary findings as above. Electronically Signed: Kenroy Mcneil MD  4/27/2024 3:30 PM EDT  Workstation ID: [id number]    CT Head Without Contrast    Result Date: 4/27/2024  CT HEAD WO CONTRAST, CT CHEST WO CONTRAST DIAGNOSTIC, CT ABDOMEN PELVIS WO CONTRAST Date of Exam: 4/27/2024 2:51 PM EDT Indication: AMS, confusion. Comparison: 12/4/2023 chest CT and 1/3/2024 PET/CT Technique: Axial CT images were obtained of the head, chest, abdomen, and pelvis without contrast administration.  Automated exposure control and iterative construction methods were used. Findings: CT HEAD: There is no evidence of acute intracranial hemorrhage, mass,  midline shift, loss of gray-white matter differentiation, or extra-axial fluid collection. Subcortical periventricular white matter hypodensities are present consistent with small vessel ischemic disease. There is extensive global parenchymal volume loss with ex vacuo dilation of the ventricular system. The basal cisterns are patent. There is no fracture or suspicious osseous lesion. Mild mucosal thickening of the bilateral maxillary sinuses. Partial mastoid air cell effusions. Bilateral ocular surgery. Soft tissues are unremarkable. CT CHEST: The central airways are patent. There are hypoventilatory changes of the lung bases, left greater than right, with associated left lower lobe rounded atelectasis. This is unchanged from recent PET/CT. There is no new focal airspace consolidation. No new suspicious pulmonary nodule or mass. Unchanged small left pleural effusion, likely chronic given associated pleural wall thickening. Unchanged cardiomegaly. Dense atherosclerotic calcifications of the coronary arteries and aortic valve. There are also dense thoracic aortic calcifications. Trace pericardial effusion. No mediastinal mass or threshold lymphadenopathy. Chest wall soft tissues show no acute abnormality. Gynecomastia. No evidence of chest wall fracture or suspicious osseous lesion. CT ABDOMEN/PELVIS: Cirrhotic morphology of the liver. No suspicious hepatic lesion. The gallbladder is unremarkable. No significant biliary ductal dilation. The spleen, pancreas, and bilateral adrenal glands show no acute abnormality. There is a small right superior pole renal cyst, unchanged. Bilateral renal parenchymal scarring/thinning. No hydronephrosis or nephrolithiasis. No suspicious renal lesion. Small hiatal hernia. Thickening of the gastric wall which may be secondary to under distention. There is no evidence of bowel obstruction. Colonic diverticulosis without definite evidence of diverticulitis. Appendix is not well visualized  and may be surgically absent or decompressed. There is no evidence of suspicious lymphadenopathy. There are shotty retroperitoneal and mesenteric lymph nodes which are unchanged from January 2024 PET/CT. There is moderate abdominopelvic ascites with autumn mesentery. No evidence of abdominal aortic aneurysm. Dense atherosclerotic calcifications of the aorta and branch vessels. The urinary bladder and prostate is difficult to evaluate given adjacent metallic artifact, however is grossly unremarkable. The abdominal and pelvic wall soft tissues show no acute abnormality. There is mild diffuse fatty muscle atrophy without discrete fluid collection. Bilateral hip arthroplasties. Cerclage fixation of the right intertrochanteric region. Chronic compression deformity of L1 vertebral body. No evidence of acute fracture or suspicious bony lesion.     Impression: CT HEAD: No acute intracranial abnormality. Chronic sequela of probable small vessel ischemic disease and severe global parenchymal volume loss. CT CHEST: No acute abnormality in the chest. Bilateral hypoventilatory changes with round atelectasis in the left lung base. Chronic adjacent small left pleural effusion with pleural wall thickening, not significantly changed from December 2023 chest CT.  Chronic ancillary findings as above. CT ABDOMEN/PELVIS: No acute abnormality in the abdomen or pelvis. Cirrhosis with moderate abdominopelvic ascites. Chronic ancillary findings as above. Electronically Signed: Kenroy Mcneil MD  4/27/2024 3:30 PM EDT  Workstation ID: [id number]    XR Chest 1 View    Result Date: 4/27/2024  XR CHEST 1 VW Date of Exam: 4/27/2024 12:50 PM EDT Indication: Weak/Dizzy/AMS triage protocol Comparison: PET/CT January 3, 2024, chest CT December 4, 2023, chest radiographs November 15, 2023. Findings: Left mid and lower lung pleural and parenchymal opacities appear similar to prior exams. There is a suspected small left pleural effusion component. No right  lung infiltrate or pleural effusion. No significant pneumothorax component is identified. Heart size appears within normal limits. There is atherosclerosis of the aorta.     Impression: 1.Left mid and lower lung pleural and parenchymal opacities appear grossly similar to recent prior studies. 2.No definite radiographic findings of acute cardiopulmonary abnormality. Electronically Signed: Samir Daniel  4/27/2024 1:24 PM EDT  Workstation ID: [id number]                 Plan for Follow-up of Pending Labs/Results:   Pending Labs       Order Current Status    Blood Culture - Blood, Arm, Right Preliminary result          Discharge Details        Discharge Medications        New Medications        Instructions Start Date   lactulose 10 GM/15ML solution  Commonly known as: CHRONULAC   10 g, Oral, Daily      levoFLOXacin 750 MG tablet  Commonly known as: Levaquin   750 mg, Oral, Every 48 Hours   Start Date: May 2, 2024     midodrine 5 MG tablet  Commonly known as: PROAMATINE   5 mg, Oral, 3 Times Daily Before Meals             Continue These Medications        Instructions Start Date   amLODIPine 5 MG tablet  Commonly known as: NORVASC   TAKE 1 TABLET BY MOUTH EVERY DAY      apixaban 2.5 MG tablet tablet  Commonly known as: ELIQUIS   2.5 mg, Oral, Daily, BID      ASPIRIN 81 PO   81 mg, Oral, Daily      atorvastatin 80 MG tablet  Commonly known as: LIPITOR   80 mg, Oral, Every Night at Bedtime      Cetirizine HCl 10 MG capsule   10 mg, Oral, Daily With Breakfast      levothyroxine 137 MCG tablet  Commonly known as: SYNTHROID, LEVOTHROID   137 mcg, Oral, Every Morning      memantine 5 MG tablet  Commonly known as: NAMENDA   5 mg, Oral, 2 Times Daily      multivitamin with minerals tablet tablet   1 tablet, Oral, Daily      omeprazole 40 MG capsule  Commonly known as: priLOSEC   40 mg, Oral, Daily             Stop These Medications      bumetanide 2 MG tablet  Commonly known as: BUMEX     CALCIUM CARB-CHOLECALCIFEROL PO      losartan 50 MG tablet  Commonly known as: COZAAR     Magnesium 400 MG capsule     meloxicam 7.5 MG tablet  Commonly known as: MOBIC     metFORMIN 1000 MG tablet  Commonly known as: GLUCOPHAGE     vitamin C 250 MG tablet  Commonly known as: ASCORBIC ACID              No Known Allergies      Discharge Disposition:  Hospice/Medical Facility (DC - External)    Diet:  Hospital:  Diet Order   Procedures    Diet: Regular/House; Fluid Consistency: Thin (IDDSI 0)            Activity:  Activity Instructions       Activity as Tolerated              Restrictions or Other Recommendations:         CODE STATUS:    Code Status and Medical Interventions:   Ordered at: 04/29/24 1149     Level Of Support Discussed With:    Next of Kin (If No Surrogate)     Code Status (Patient has no pulse and is not breathing):    No CPR (Do Not Attempt to Resuscitate)     Medical Interventions (Patient has pulse or is breathing):    Comfort Measures     Comments:    exception for antibiotics       Future Appointments   Date Time Provider Department Center   5/1/2024  4:15 PM MED 7  [facility]   6/20/2024 11:30 AM Sheba Pereira PA-C [facility]       Additional Instructions for the Follow-ups that You Need to Schedule       Discharge Follow-up with PCP   As directed       Currently Documented PCP:    Ilir Fair MD    PCP Phone Number:    [phone]     Follow Up Details: As needed                  Shayy Hope, EMM[facility]  05/01/24      Time Spent on Discharge:  I spent  35  minutes on this discharge activity which included: face-to-face encounter with the patient, reviewing the data in the system, coordination of the care with the nursing staff as well as consultants, documentation, and entering orders.            Electronically signed by Costa Vale MD at 05/01/24 7532

## 2024-05-01 NOTE — PROGRESS NOTES
Visit made: family at bedside. Patient sitting in bed in good spirits. Patient had drain tube placed today, dressing c/d/i. Box of Aspiria drain kit in room to be taken home with patient. Family denies any concerns/questions at this time. Meds to bed in route. Family reminded to contact Hospice 24/hr number to schedule admission. For further assistance please call [phone].     Keiry Nickerson RN  Hospital Hospice Liaison

## 2024-05-01 NOTE — NURSING NOTE
Image guided 15.5fr  tunneled peritoneal catheter placed to peritoneum by MD Monk. 5.5 Liters removed from peritoneum. Patient tolerated well. Patient was given 50 mcg Fentanyl & 1 mg Versed. Sedation time of 12 minutes. Report called to RICARDA Johnson.

## 2024-05-01 NOTE — DISCHARGE SUMMARY
[facility] Medicine Services  DISCHARGE SUMMARY    Patient Name: Onel Clark  : 3/25/1933  MRN: [id number]    Date of Admission: 2024 12:44 PM  Date of Discharge:  2024  Primary Care Physician: Ilir Fair MD    Consults       Date and Time Order Name Status Description    2024 10:12 AM Inpatient Urology Consult Completed     2024  8:38 AM Inpatient Nephrology Consult Completed     2024  9:46 PM Inpatient Gastroenterology Consult Completed     2024  5:56 PM Inpatient Palliative Care MD Consult Completed             Hospital Course     Presenting Problem: Confusion    Active Hospital Problems    Diagnosis  POA    **Metabolic encephalopathy [G93.41]  Yes    Chronic anticoagulation [Z79.01]  Not Applicable    PVD (peripheral vascular disease) [I73.9]  Yes    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 2 diabetes mellitus [E11.621, L97.429]  Yes    Cirrhosis of liver [K74.60]  Yes    Hx-TIA (transient ischemic attack) [Z86.73]  Not Applicable    CHF (no ECHO on file currently) [I50.9]  Yes    Elevated serum creatinine [R79.89]  Yes    CKD (chronic kidney disease) stage 3, GFR 30-59 ml/min [N18.30]  Yes    Atrial flutter with controlled response [I48.92]  Yes    Type 2 diabetes mellitus without complication, without long-term current use of insulin [E11.9]  Yes    Primary hypertension [I10]  Yes      Resolved Hospital Problems    Diagnosis Date Resolved POA    Hypomagnesemia [E83.42] 2024 Yes          Hospital Course:  Onel Clark is a 89 y.o. male with past medical history of cirrhosis getting periodic outpatient paracentesis, diabetes mellitus type 2, history of TIA, atrial flutter on chronic anticoagulation, PVD, CKD 3, and baseline chronic debility related to his advanced age. Patient presented to [facility] ED with acute onset of confusion and restlessness. Workup in ED was mostly unremarkable except for WBC 15 and elevated CRP  without obvious source of infection, as well as hypomagnesemia. Patient had previously been on outpatient PO Doxy for left heel & toe diabetic ulcer. He was started on empiric Zosyn and vanc.     Palliative care and hospice were consulted for goals of care given patient's declining renal function, ascites, and poor p.o. intake.  Partner discussed with family and they have opted to take patient home with hospice care. Patient will continue Cipro for Pseudomonas coverage in the foot wound per family's wishes.  Hospice will continue wound care. Home medications have been adjusted with several being discontinued. Urology has placed a Varner catheter for urinary retention.  IR has placed a palliative peritoneal drain to manage ascites. Patient will discharge home today with hospice.     Metabolic encephalopathy  - hx of Aflutter, MRI brain ordered to rule out embolic dz given vague confusion presentation to rule out embolic phenomena however family declined MRI as they are contemplating on hospice care 04/29  -1/2 blood cx positive, likely contaminant  -blood cx NGTD. MRSA screen negative     HCICO on CKD Stage III, concern for hepatorenal syndrome   - urinary retention noted, Varner catheter placed by urology for comfort care     Aortic Stenosis  -declined for TAVR by [facility]     Cirrhosis  - palliative drain placed 05/01/2024, drained 5.5L     Diabetic left foot wound 1st toe and heel   - Cx growing Pseudomonas, sensitive to Cipro and Levaquin    -Continue wound care     NIDDM2  - Home metformin dc'd     PVD  Hx of TIAs  HTN  HL  Aflutter on chronic anticoagulation  - continue Eliquis  - not on rate medications per home meds  - continue ASA 81mg and Lipitor 80mg      Senile cognitive impairment  At risk for hospital delirium  - takes Namenda at home       Discharge Follow Up Recommendations for outpatient labs/diagnostics:  --Follow up with PCP as needed    Day of Discharge     HPI:   Resting in bed.  Family is at the  bedside.  Patient has had peritoneal drain placed this morning and had 5.5 L removed.  He notes some pain in his feet still.  He otherwise denies concerns.  He says he is looking forward to going home.    Review of Systems  Gen- No fevers, chills  CV- No chest pain, palpitations  Resp- No cough, dyspnea  GI- No N/V/D, abd pain      Vital Signs:   Temp:  [97.9 °F (36.6 °C)-98.2 °F (36.8 °C)] 97.9 °F (36.6 °C)  Heart Rate:  [64-75] 66  Resp:  [12-18] 16  BP: (111-129)/(52-76) 117/61  Flow (L/min):  [0-2] 0      Physical Exam:  Constitutional: No acute distress, awake, alert, frail-appearing  HENT: NCAT, mucous membranes moist  Respiratory: Clear to auscultation bilaterally, respiratory effort normal, room air  Cardiovascular: Regular rhythm, + murmur, no rubs or gallops   Gastrointestinal: Positive bowel sounds, soft, nontender, nondistended, peritoneal during RUQ  Musculoskeletal: No bilateral ankle edema  Psychiatric: Appropriate affect, cooperative  Neurologic: Oriented x 3, moves all extremities, speech clear  Skin: No rashes      Pertinent  and/or Most Recent Results     LAB RESULTS:      Lab 04/30/24  0821 04/29/24  0720 04/28/24  0906 04/27/24  1538 04/27/24  1257   WBC  --  13.80* 15.00*  --  15.76*   HEMOGLOBIN  --  9.1* 10.2*  --  10.5*   HEMATOCRIT  --  27.5* 30.9*  --  32.0*   PLATELETS  --  321 335  --  352   NEUTROS ABS  --   --  12.38*  --  12.54*   IMMATURE GRANS (ABS)  --   --  0.06*  --  0.05   LYMPHS ABS  --   --  1.10  --  1.88   MONOS ABS  --   --  1.41*  --  1.24*   EOS ABS  --   --  0.00  --  0.00   MCV  --  87.3 87.5  --  87.9   CRP  --   --   --  19.16*  --    PROCALCITONIN  --   --   --   --  1.52*   LACTATE  --   --   --   --  1.8   PROTIME 19.5*  --   --   --   --          Lab 04/29/24  0720 04/28/24  0440 04/27/24  1755 04/27/24  1538 04/27/24  1257   SODIUM 139 139 139  --  138   POTASSIUM 4.4 4.2 4.6  --  4.8   CHLORIDE 102 103 103  --  102   CO2 21.0* 24.0 23.0  --  24.0   ANION GAP  16.0* 12.0 13.0  --  12.0   BUN 51* 49* 51*  --  51*   CREATININE 2.39* 2.18* 2.09*  --  2.25*   EGFR 25.0* 27.9* 29.3*  --  26.9*   GLUCOSE 137* 165* 124*  --  134*   CALCIUM 8.6 8.6 8.6  --  8.8   MAGNESIUM 2.0 2.5* 1.5*  --  1.5*   TSH  --   --   --  2.610  --          Lab 04/27/24  1257   TOTAL PROTEIN 6.0   ALBUMIN 3.0*   GLOBULIN 3.0   ALT (SGPT) 9   AST (SGOT) 18   BILIRUBIN 1.0   ALK PHOS 222*         Lab 04/30/24  0821 04/27/24  1538 04/27/24  1257   HSTROP T  --  134* 134*   PROTIME 19.5*  --   --    INR 1.63*  --   --                  Brief Urine Lab Results  (Last result in the past 365 days)        Color   Clarity   Blood   Leuk Est   Nitrite   Protein   CREAT   Urine HCG        04/28/24 1814             124.4               Microbiology Results (last 10 days)       Procedure Component Value - Date/Time    Blood Culture - Blood, Arm, Right [[id number]]  (Normal) Collected: 04/27/24 1540    Lab Status: Preliminary result Specimen: Blood from Arm, Right Updated: 04/30/24 1700     Blood Culture No growth at 3 days    MRSA Screen, PCR (Inpatient) - Swab, Nares [[id number]]  (Normal) Collected: 04/27/24 1401    Lab Status: Final result Specimen: Swab from Nares Updated: 04/27/24 1522     MRSA PCR Negative    Narrative:      The negative predictive value of this diagnostic test is high and should only be used to consider de-escalating anti-MRSA therapy. A positive result may indicate colonization with MRSA and must be correlated clinically.  MRSA Negative    Blood Culture - Blood, Arm, Left [[id number]]  (Abnormal) Collected: 04/27/24 1359    Lab Status: Final result Specimen: Blood from Arm, Left Updated: 04/30/24 0640     Blood Culture Staphylococcus, coagulase negative     Isolated from Aerobic Bottle     Gram Stain Aerobic Bottle Gram positive cocci in pairs and clusters      Anaerobic Bottle Gram positive cocci in pairs and clusters    Narrative:      Probable contaminant requires clinical correlation,  susceptibility not performed unless requested by physician.    Less than seven (7) mL's of blood was collected.  Insufficient quantity may yield false negative results.    Blood Culture ID, PCR - Blood, Arm, Left [[id number]]  (Abnormal) Collected: 04/27/24 135    Lab Status: Final result Specimen: Blood from Arm, Left Updated: 04/28/24 1837     BCID, PCR Staph spp, not aureus or lugdunensis. Identification by BCID2 PCR.     BOTTLE TYPE Aerobic Bottle            US Abdomen Limited    Result Date: 4/30/2024  US ABDOMEN LIMITED Date of Exam: 4/30/2024 11:10 AM EDT Indication: ascites. Comparison: No comparisons available. Technique: Grayscale and color Doppler ultrasound evaluation of the right upper quadrant was performed. Findings: There is a large amount of ascites in all 4 quadrants.     Impression: Large amount of abdominal and pelvic ascites. Electronically Signed: Angeles Lawrence MD  4/30/2024 11:30 AM EDT  Workstation ID: [id number]    CT Chest Without Contrast Diagnostic    Result Date: 4/27/2024  CT HEAD WO CONTRAST, CT CHEST WO CONTRAST DIAGNOSTIC, CT ABDOMEN PELVIS WO CONTRAST Date of Exam: 4/27/2024 2:51 PM EDT Indication: AMS, confusion. Comparison: 12/4/2023 chest CT and 1/3/2024 PET/CT Technique: Axial CT images were obtained of the head, chest, abdomen, and pelvis without contrast administration.  Automated exposure control and iterative construction methods were used. Findings: CT HEAD: There is no evidence of acute intracranial hemorrhage, mass, midline shift, loss of gray-white matter differentiation, or extra-axial fluid collection. Subcortical periventricular white matter hypodensities are present consistent with small vessel ischemic disease. There is extensive global parenchymal volume loss with ex vacuo dilation of the ventricular system. The basal cisterns are patent. There is no fracture or suspicious osseous lesion. Mild mucosal thickening of the bilateral maxillary sinuses. Partial mastoid  air cell effusions. Bilateral ocular surgery. Soft tissues are unremarkable. CT CHEST: The central airways are patent. There are hypoventilatory changes of the lung bases, left greater than right, with associated left lower lobe rounded atelectasis. This is unchanged from recent PET/CT. There is no new focal airspace consolidation. No new suspicious pulmonary nodule or mass. Unchanged small left pleural effusion, likely chronic given associated pleural wall thickening. Unchanged cardiomegaly. Dense atherosclerotic calcifications of the coronary arteries and aortic valve. There are also dense thoracic aortic calcifications. Trace pericardial effusion. No mediastinal mass or threshold lymphadenopathy. Chest wall soft tissues show no acute abnormality. Gynecomastia. No evidence of chest wall fracture or suspicious osseous lesion. CT ABDOMEN/PELVIS: Cirrhotic morphology of the liver. No suspicious hepatic lesion. The gallbladder is unremarkable. No significant biliary ductal dilation. The spleen, pancreas, and bilateral adrenal glands show no acute abnormality. There is a small right superior pole renal cyst, unchanged. Bilateral renal parenchymal scarring/thinning. No hydronephrosis or nephrolithiasis. No suspicious renal lesion. Small hiatal hernia. Thickening of the gastric wall which may be secondary to under distention. There is no evidence of bowel obstruction. Colonic diverticulosis without definite evidence of diverticulitis. Appendix is not well visualized and may be surgically absent or decompressed. There is no evidence of suspicious lymphadenopathy. There are shotty retroperitoneal and mesenteric lymph nodes which are unchanged from January 2024 PET/CT. There is moderate abdominopelvic ascites with autumn mesentery. No evidence of abdominal aortic aneurysm. Dense atherosclerotic calcifications of the aorta and branch vessels. The urinary bladder and prostate is difficult to evaluate given adjacent metallic  artifact, however is grossly unremarkable. The abdominal and pelvic wall soft tissues show no acute abnormality. There is mild diffuse fatty muscle atrophy without discrete fluid collection. Bilateral hip arthroplasties. Cerclage fixation of the right intertrochanteric region. Chronic compression deformity of L1 vertebral body. No evidence of acute fracture or suspicious bony lesion.     Impression: CT HEAD: No acute intracranial abnormality. Chronic sequela of probable small vessel ischemic disease and severe global parenchymal volume loss. CT CHEST: No acute abnormality in the chest. Bilateral hypoventilatory changes with round atelectasis in the left lung base. Chronic adjacent small left pleural effusion with pleural wall thickening, not significantly changed from December 2023 chest CT.  Chronic ancillary findings as above. CT ABDOMEN/PELVIS: No acute abnormality in the abdomen or pelvis. Cirrhosis with moderate abdominopelvic ascites. Chronic ancillary findings as above. Electronically Signed: Kenroy Mcneil MD  4/27/2024 3:30 PM EDT  Workstation ID: [id number]    CT Abdomen Pelvis Without Contrast    Result Date: 4/27/2024  CT HEAD WO CONTRAST, CT CHEST WO CONTRAST DIAGNOSTIC, CT ABDOMEN PELVIS WO CONTRAST Date of Exam: 4/27/2024 2:51 PM EDT Indication: AMS, confusion. Comparison: 12/4/2023 chest CT and 1/3/2024 PET/CT Technique: Axial CT images were obtained of the head, chest, abdomen, and pelvis without contrast administration.  Automated exposure control and iterative construction methods were used. Findings: CT HEAD: There is no evidence of acute intracranial hemorrhage, mass, midline shift, loss of gray-white matter differentiation, or extra-axial fluid collection. Subcortical periventricular white matter hypodensities are present consistent with small vessel ischemic disease. There is extensive global parenchymal volume loss with ex vacuo dilation of the ventricular system. The basal cisterns are patent.  There is no fracture or suspicious osseous lesion. Mild mucosal thickening of the bilateral maxillary sinuses. Partial mastoid air cell effusions. Bilateral ocular surgery. Soft tissues are unremarkable. CT CHEST: The central airways are patent. There are hypoventilatory changes of the lung bases, left greater than right, with associated left lower lobe rounded atelectasis. This is unchanged from recent PET/CT. There is no new focal airspace consolidation. No new suspicious pulmonary nodule or mass. Unchanged small left pleural effusion, likely chronic given associated pleural wall thickening. Unchanged cardiomegaly. Dense atherosclerotic calcifications of the coronary arteries and aortic valve. There are also dense thoracic aortic calcifications. Trace pericardial effusion. No mediastinal mass or threshold lymphadenopathy. Chest wall soft tissues show no acute abnormality. Gynecomastia. No evidence of chest wall fracture or suspicious osseous lesion. CT ABDOMEN/PELVIS: Cirrhotic morphology of the liver. No suspicious hepatic lesion. The gallbladder is unremarkable. No significant biliary ductal dilation. The spleen, pancreas, and bilateral adrenal glands show no acute abnormality. There is a small right superior pole renal cyst, unchanged. Bilateral renal parenchymal scarring/thinning. No hydronephrosis or nephrolithiasis. No suspicious renal lesion. Small hiatal hernia. Thickening of the gastric wall which may be secondary to under distention. There is no evidence of bowel obstruction. Colonic diverticulosis without definite evidence of diverticulitis. Appendix is not well visualized and may be surgically absent or decompressed. There is no evidence of suspicious lymphadenopathy. There are shotty retroperitoneal and mesenteric lymph nodes which are unchanged from January 2024 PET/CT. There is moderate abdominopelvic ascites with autumn mesentery. No evidence of abdominal aortic aneurysm. Dense atherosclerotic  calcifications of the aorta and branch vessels. The urinary bladder and prostate is difficult to evaluate given adjacent metallic artifact, however is grossly unremarkable. The abdominal and pelvic wall soft tissues show no acute abnormality. There is mild diffuse fatty muscle atrophy without discrete fluid collection. Bilateral hip arthroplasties. Cerclage fixation of the right intertrochanteric region. Chronic compression deformity of L1 vertebral body. No evidence of acute fracture or suspicious bony lesion.     Impression: CT HEAD: No acute intracranial abnormality. Chronic sequela of probable small vessel ischemic disease and severe global parenchymal volume loss. CT CHEST: No acute abnormality in the chest. Bilateral hypoventilatory changes with round atelectasis in the left lung base. Chronic adjacent small left pleural effusion with pleural wall thickening, not significantly changed from December 2023 chest CT.  Chronic ancillary findings as above. CT ABDOMEN/PELVIS: No acute abnormality in the abdomen or pelvis. Cirrhosis with moderate abdominopelvic ascites. Chronic ancillary findings as above. Electronically Signed: Kenroy Mcneil MD  4/27/2024 3:30 PM EDT  Workstation ID: [id number]    CT Head Without Contrast    Result Date: 4/27/2024  CT HEAD WO CONTRAST, CT CHEST WO CONTRAST DIAGNOSTIC, CT ABDOMEN PELVIS WO CONTRAST Date of Exam: 4/27/2024 2:51 PM EDT Indication: AMS, confusion. Comparison: 12/4/2023 chest CT and 1/3/2024 PET/CT Technique: Axial CT images were obtained of the head, chest, abdomen, and pelvis without contrast administration.  Automated exposure control and iterative construction methods were used. Findings: CT HEAD: There is no evidence of acute intracranial hemorrhage, mass, midline shift, loss of gray-white matter differentiation, or extra-axial fluid collection. Subcortical periventricular white matter hypodensities are present consistent with small vessel ischemic disease. There is  extensive global parenchymal volume loss with ex vacuo dilation of the ventricular system. The basal cisterns are patent. There is no fracture or suspicious osseous lesion. Mild mucosal thickening of the bilateral maxillary sinuses. Partial mastoid air cell effusions. Bilateral ocular surgery. Soft tissues are unremarkable. CT CHEST: The central airways are patent. There are hypoventilatory changes of the lung bases, left greater than right, with associated left lower lobe rounded atelectasis. This is unchanged from recent PET/CT. There is no new focal airspace consolidation. No new suspicious pulmonary nodule or mass. Unchanged small left pleural effusion, likely chronic given associated pleural wall thickening. Unchanged cardiomegaly. Dense atherosclerotic calcifications of the coronary arteries and aortic valve. There are also dense thoracic aortic calcifications. Trace pericardial effusion. No mediastinal mass or threshold lymphadenopathy. Chest wall soft tissues show no acute abnormality. Gynecomastia. No evidence of chest wall fracture or suspicious osseous lesion. CT ABDOMEN/PELVIS: Cirrhotic morphology of the liver. No suspicious hepatic lesion. The gallbladder is unremarkable. No significant biliary ductal dilation. The spleen, pancreas, and bilateral adrenal glands show no acute abnormality. There is a small right superior pole renal cyst, unchanged. Bilateral renal parenchymal scarring/thinning. No hydronephrosis or nephrolithiasis. No suspicious renal lesion. Small hiatal hernia. Thickening of the gastric wall which may be secondary to under distention. There is no evidence of bowel obstruction. Colonic diverticulosis without definite evidence of diverticulitis. Appendix is not well visualized and may be surgically absent or decompressed. There is no evidence of suspicious lymphadenopathy. There are shotty retroperitoneal and mesenteric lymph nodes which are unchanged from January 2024 PET/CT. There is  moderate abdominopelvic ascites with autumn mesentery. No evidence of abdominal aortic aneurysm. Dense atherosclerotic calcifications of the aorta and branch vessels. The urinary bladder and prostate is difficult to evaluate given adjacent metallic artifact, however is grossly unremarkable. The abdominal and pelvic wall soft tissues show no acute abnormality. There is mild diffuse fatty muscle atrophy without discrete fluid collection. Bilateral hip arthroplasties. Cerclage fixation of the right intertrochanteric region. Chronic compression deformity of L1 vertebral body. No evidence of acute fracture or suspicious bony lesion.     Impression: CT HEAD: No acute intracranial abnormality. Chronic sequela of probable small vessel ischemic disease and severe global parenchymal volume loss. CT CHEST: No acute abnormality in the chest. Bilateral hypoventilatory changes with round atelectasis in the left lung base. Chronic adjacent small left pleural effusion with pleural wall thickening, not significantly changed from December 2023 chest CT.  Chronic ancillary findings as above. CT ABDOMEN/PELVIS: No acute abnormality in the abdomen or pelvis. Cirrhosis with moderate abdominopelvic ascites. Chronic ancillary findings as above. Electronically Signed: Kenroy Mcneil MD  4/27/2024 3:30 PM EDT  Workstation ID: [id number]    XR Chest 1 View    Result Date: 4/27/2024  XR CHEST 1 VW Date of Exam: 4/27/2024 12:50 PM EDT Indication: Weak/Dizzy/AMS triage protocol Comparison: PET/CT January 3, 2024, chest CT December 4, 2023, chest radiographs November 15, 2023. Findings: Left mid and lower lung pleural and parenchymal opacities appear similar to prior exams. There is a suspected small left pleural effusion component. No right lung infiltrate or pleural effusion. No significant pneumothorax component is identified. Heart size appears within normal limits. There is atherosclerosis of the aorta.     Impression: 1.Left mid and lower lung  pleural and parenchymal opacities appear grossly similar to recent prior studies. 2.No definite radiographic findings of acute cardiopulmonary abnormality. Electronically Signed: Samir Daniel  4/27/2024 1:24 PM EDT  Workstation ID: [id number]                 Plan for Follow-up of Pending Labs/Results:   Pending Labs       Order Current Status    Blood Culture - Blood, Arm, Right Preliminary result          Discharge Details        Discharge Medications        New Medications        Instructions Start Date   lactulose 10 GM/15ML solution  Commonly known as: CHRONULAC   10 g, Oral, Daily      levoFLOXacin 750 MG tablet  Commonly known as: Levaquin   750 mg, Oral, Every 48 Hours   Start Date: May 2, 2024     midodrine 5 MG tablet  Commonly known as: PROAMATINE   5 mg, Oral, 3 Times Daily Before Meals             Continue These Medications        Instructions Start Date   amLODIPine 5 MG tablet  Commonly known as: NORVASC   TAKE 1 TABLET BY MOUTH EVERY DAY      apixaban 2.5 MG tablet tablet  Commonly known as: ELIQUIS   2.5 mg, Oral, Daily, BID      ASPIRIN 81 PO   81 mg, Oral, Daily      atorvastatin 80 MG tablet  Commonly known as: LIPITOR   80 mg, Oral, Every Night at Bedtime      Cetirizine HCl 10 MG capsule   10 mg, Oral, Daily With Breakfast      levothyroxine 137 MCG tablet  Commonly known as: SYNTHROID, LEVOTHROID   137 mcg, Oral, Every Morning      memantine 5 MG tablet  Commonly known as: NAMENDA   5 mg, Oral, 2 Times Daily      multivitamin with minerals tablet tablet   1 tablet, Oral, Daily      omeprazole 40 MG capsule  Commonly known as: priLOSEC   40 mg, Oral, Daily             Stop These Medications      bumetanide 2 MG tablet  Commonly known as: BUMEX     CALCIUM CARB-CHOLECALCIFEROL PO     losartan 50 MG tablet  Commonly known as: COZAAR     Magnesium 400 MG capsule     meloxicam 7.5 MG tablet  Commonly known as: MOBIC     metFORMIN 1000 MG tablet  Commonly known as: GLUCOPHAGE     vitamin C 250 MG  tablet  Commonly known as: ASCORBIC ACID              No Known Allergies      Discharge Disposition:  Hospice/Medical Facility (DC - External)    Diet:  Hospital:  Diet Order   Procedures    Diet: Regular/House; Fluid Consistency: Thin (IDDSI 0)            Activity:  Activity Instructions       Activity as Tolerated              Restrictions or Other Recommendations:         CODE STATUS:    Code Status and Medical Interventions:   Ordered at: 04/29/24 1149     Level Of Support Discussed With:    Next of Kin (If No Surrogate)     Code Status (Patient has no pulse and is not breathing):    No CPR (Do Not Attempt to Resuscitate)     Medical Interventions (Patient has pulse or is breathing):    Comfort Measures     Comments:    exception for antibiotics       Future Appointments   Date Time Provider Department Center   5/1/2024  4:15 PM MED 7  [facility]   6/20/2024 11:30 AM Sheba Pereira PA-C [facility]       Additional Instructions for the Follow-ups that You Need to Schedule       Discharge Follow-up with PCP   As directed       Currently Documented PCP:    Ilir Fair MD    PCP Phone Number:    [phone]     Follow Up Details: As needed                  Shayy Hope, APRN  05/01/24      Time Spent on Discharge:  I spent  35  minutes on this discharge activity which included: face-to-face encounter with the patient, reviewing the data in the system, coordination of the care with the nursing staff as well as consultants, documentation, and entering orders.

## 2024-05-01 NOTE — PLAN OF CARE
"Goal Outcome Evaluation:  Plan of Care Reviewed With: patient        Progress: no change  Outcome Evaluation: Palliative RN saw pt 5/1 at 1215.  Wife and son were at [facility].  Pt. got aspira drain this morning and is waiting to discharge home via ambulance at 1630 today.  Pt. was pleasantly confused and woke asking for \"hot sauce\".  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  His last job was as a .  Palliative care to follow until discharge.       1300 Palliative IDT meeting: TIFFANI Webb RN, CHPN; AMARA Yi, APRN; KISHA Cochran MDiv ;VALENCIA Lozano MD.; BENNY Bahena RN, CHPN; TERI Calvin RN, CHPN    After hours, weekends and holidays, contact Palliative Provider by calling [phone].                                   "

## 2024-05-02 LAB — BACTERIA SPEC AEROBE CULT: NORMAL

## 2024-05-02 RX ORDER — MEMANTINE HYDROCHLORIDE 5 MG/1
5 TABLET ORAL 2 TIMES DAILY
Qty: 180 TABLET | Refills: 0 | Status: SHIPPED | OUTPATIENT
Start: 2024-05-02

## 2024-05-02 RX ORDER — OMEPRAZOLE 40 MG/1
40 CAPSULE, DELAYED RELEASE ORAL DAILY
Qty: 90 CAPSULE | Refills: 0 | Status: SHIPPED | OUTPATIENT
Start: 2024-05-02

## 2024-07-16 ENCOUNTER — TELEPHONE (OUTPATIENT)
Dept: FAMILY MEDICINE CLINIC | Facility: CLINIC | Age: 89
End: 2024-07-16

## 2024-07-16 NOTE — TELEPHONE ENCOUNTER
“Please be informed that patient has passed. Patient has been marked  in the system. The date of death is: 07/15/24    Caller: [facility]    Relationship: Other    Best call back number: [phone]    Did the patient have surgery within 30 days of their passing (Y/N): N/A
